# Patient Record
Sex: FEMALE | Race: WHITE | NOT HISPANIC OR LATINO | Employment: OTHER | URBAN - METROPOLITAN AREA
[De-identification: names, ages, dates, MRNs, and addresses within clinical notes are randomized per-mention and may not be internally consistent; named-entity substitution may affect disease eponyms.]

---

## 2017-01-27 ENCOUNTER — INFUSION THERAPY VISIT (OUTPATIENT)
Dept: INFUSION THERAPY | Facility: CLINIC | Age: 82
End: 2017-01-27
Attending: INTERNAL MEDICINE
Payer: COMMERCIAL

## 2017-01-27 ENCOUNTER — HOSPITAL ENCOUNTER (OUTPATIENT)
Facility: CLINIC | Age: 82
Setting detail: SPECIMEN
Discharge: HOME | End: 2017-01-27
Attending: INTERNAL MEDICINE | Admitting: INTERNAL MEDICINE
Payer: COMMERCIAL

## 2017-01-27 DIAGNOSIS — C18.2 CANCER OF RIGHT COLON (H): ICD-10-CM

## 2017-01-27 DIAGNOSIS — C50.211 MALIGNANT NEOPLASM OF UPPER-INNER QUADRANT OF RIGHT FEMALE BREAST (H): ICD-10-CM

## 2017-01-27 LAB
ALBUMIN SERPL-MCNC: 3.7 G/DL (ref 3.4–5)
ALP SERPL-CCNC: 72 U/L (ref 40–150)
ALT SERPL W P-5'-P-CCNC: 32 U/L (ref 0–50)
AST SERPL W P-5'-P-CCNC: 24 U/L (ref 0–45)
BILIRUB DIRECT SERPL-MCNC: <0.1 MG/DL (ref 0–0.2)
BILIRUB SERPL-MCNC: 0.4 MG/DL (ref 0.2–1.3)
ERYTHROCYTE [DISTWIDTH] IN BLOOD BY AUTOMATED COUNT: 13.1 % (ref 10–15)
HCT VFR BLD AUTO: 37.9 % (ref 35–47)
HGB BLD-MCNC: 13.4 G/DL (ref 11.7–15.7)
MCH RBC QN AUTO: 32.5 PG (ref 26.5–33)
MCHC RBC AUTO-ENTMCNC: 35.4 G/DL (ref 31.5–36.5)
MCV RBC AUTO: 92 FL (ref 78–100)
PLATELET # BLD AUTO: 292 10E9/L (ref 150–450)
PROT SERPL-MCNC: 7.7 G/DL (ref 6.8–8.8)
RBC # BLD AUTO: 4.12 10E12/L (ref 3.8–5.2)
WBC # BLD AUTO: 4.6 10E9/L (ref 4–11)

## 2017-01-27 PROCEDURE — 80076 HEPATIC FUNCTION PANEL: CPT | Performed by: INTERNAL MEDICINE

## 2017-01-27 PROCEDURE — 82378 CARCINOEMBRYONIC ANTIGEN: CPT | Performed by: INTERNAL MEDICINE

## 2017-01-27 PROCEDURE — 85027 COMPLETE CBC AUTOMATED: CPT | Performed by: INTERNAL MEDICINE

## 2017-01-27 PROCEDURE — 36415 COLL VENOUS BLD VENIPUNCTURE: CPT

## 2017-01-27 NOTE — PROGRESS NOTES
Medical Assistant Note:    Wanda Dockery presents today for labs.    Patient seen by provider today: No  Concerns: No Concerns    Labs drawn:  See orders   Peripheral RAC 21 gauge, green butterfly needle type. Tolerated procedure well.    Post Assessment:  Labs drawn without difficulty: Yes.    Discharge Plan:  Face to Face time: 8min.      Jessica Rhoades MA

## 2017-01-28 LAB — CEA SERPL-MCNC: 2.4 UG/L (ref 0–2.5)

## 2017-01-30 ENCOUNTER — ONCOLOGY VISIT (OUTPATIENT)
Dept: ONCOLOGY | Facility: CLINIC | Age: 82
End: 2017-01-30
Attending: INTERNAL MEDICINE
Payer: COMMERCIAL

## 2017-01-30 VITALS
RESPIRATION RATE: 16 BRPM | BODY MASS INDEX: 23.67 KG/M2 | TEMPERATURE: 97.9 F | WEIGHT: 146.6 LBS | DIASTOLIC BLOOD PRESSURE: 77 MMHG | HEART RATE: 80 BPM | SYSTOLIC BLOOD PRESSURE: 119 MMHG | OXYGEN SATURATION: 98 %

## 2017-01-30 DIAGNOSIS — C50.211 MALIGNANT NEOPLASM OF UPPER-INNER QUADRANT OF RIGHT FEMALE BREAST (H): Primary | ICD-10-CM

## 2017-01-30 DIAGNOSIS — C18.2 CANCER OF RIGHT COLON (H): ICD-10-CM

## 2017-01-30 PROCEDURE — 99214 OFFICE O/P EST MOD 30 MIN: CPT | Performed by: INTERNAL MEDICINE

## 2017-01-30 PROCEDURE — 99211 OFF/OP EST MAY X REQ PHY/QHP: CPT

## 2017-01-30 RX ORDER — ANASTROZOLE 1 MG/1
1 TABLET ORAL DAILY
Qty: 90 TABLET | Refills: 3 | Status: SHIPPED | OUTPATIENT
Start: 2017-01-30 | End: 2017-07-17

## 2017-01-30 ASSESSMENT — PAIN SCALES - GENERAL: PAINLEVEL: NO PAIN (0)

## 2017-01-30 NOTE — PROGRESS NOTES
"Wanda Dockery is a 81 year old female who presents for:  Chief Complaint   Patient presents with     Oncology Clinic Visit     Ret Breast Ca         Initial Vitals:  There were no vitals taken for this visit. Estimated body mass index is 22.35 kg/(m^2) as calculated from the following:    Height as of 8/29/16: 1.676 m (5' 6\").    Weight as of 10/3/16: 62.778 kg (138 lb 6.4 oz).. There is no height or weight on file to calculate BSA. BP completed using cuff size: regular  Data Unavailable No LMP recorded. Patient is postmenopausal. Allergies and medications reviewed.     Medications: Medication refills needed today.  Pharmacy name entered into [email or web address]: [facility] [place] - [place], [place] - [address]    Comments: Needs Anastrozole refill    6 minutes for nursing intake (face to face time)   Mary Lou Bolanos CMA        DISCHARGE PLAN:    1.) She is aware to continue anastrozole/ Calcium and Vitamin D  2.) Escorted to  to have scheduling coordinate appointments for her    Next appointments: See patient instruction section  Departure Mode: Ambulatory  Accompanied by:   5 minutes for nursing discharge (face to face time)   Susanne Castorena RN      "

## 2017-01-30 NOTE — PATIENT INSTRUCTIONS
Continue anastrazole.  Continue calcium and vitamin D.  Mammogram.  Labs and CT scan in mid July.  FU after CT scan.

## 2017-01-30 NOTE — MR AVS SNAPSHOT
After Visit Summary   1/30/2017    Wanda Dockery    MRN: [id number]           Patient Information     Date Of Birth          1935        Visit Information        Provider Department      1/30/2017 8:30 AM Angie Rodas MD [facility]        Today's Diagnoses     Malignant neoplasm of upper-inner quadrant of right female breast (H)    -  1     Cancer of right colon (H)           Care Instructions    Continue anastrazole.  Continue calcium and vitamin D.  Mammogram.  Labs and CT scan in mid July.  FU after CT scan.        Follow-ups after your visit        Your next 10 appointments already scheduled     Feb 14, 2017 10:30 AM   MA DIAGNOSTIC DIGITAL BILATERAL with SHBCMA3   [facility] ([facility])    [address], [address]  [place] [address] [phone]           Do not use any powder, lotion or deodorant under your arms or on your breast. If you do, we will ask you to remove it before your exam.  Wear comfortable, two-piece clothing.  If you have any allergies, tell your care team.  Bring any previous mammograms from other facilities or have them mailed to the breast center.            Jul 11, 2017 10:00 AM   Level 1 with SOUTH CHAIR 5   [facility] ([facility])    [facility]  [address]  [place] [facility] [address] [phone]            Jul 11, 2017 11:00 AM   CT CHEST ABDOMEN PELVIS W/O & W CONTRAST with SHCT1   [facility] ([facility])    [address] [address] [phone]           Please bring any scans or X-rays taken at other hospitals, if similar tests were done. Also bring a list of your medicines, including vitamins, minerals and over-the-counter drugs. It is safest to leave personal items at home.  Be sure to tell your doctor:   If you have any allergies.   If there s any chance you are  pregnant.   If you are breastfeeding.   If you have any special needs.  You may have contrast for this exam. To prepare:   Do not eat or drink for 2 hours before your exam. If you need to take medicine, you may take it with small sips of water. (We may ask you to take liquid medicine as well.)   The day before your exam, drink extra fluids at least six 8-ounce glasses (unless your doctor tells you to restrict your fluids).  Patients over 70 or patients with diabetes or kidney problems:   If you haven t had a blood test (creatinine test) within the last 30 days, go to your clinic or Diagnostic Imaging Department for this test.  If you have diabetes:   If your kidney function is normal, continue taking your metformin (Avandamet, Glucophage, Glucovance, Metaglip) on the day of your exam.   If your kidney function is abnormal, wait 48 hours before restarting this medicine.  You will have oral contrast for this exam:   You will drink the contrast at home. Get this from your clinic or Diagnostic Imaging Department. Please follow the directions given.  Please wear loose clothing, such as a sweat suit or jogging clothes. Avoid snaps, zippers and other metal. We may ask you to undress and put on a hospital gown.  If you have any questions, please call the Imaging Department where you will have your exam.            Jul 17, 2017  9:30 AM   Return Visit with Angie Rodas MD   [facility] ([facility])    [facility]  [address]  [facility] [address]   [phone]              Future tests that were ordered for you today     Open Future Orders        Priority Expected Expires Ordered    CBC with platelets Routine 7/17/2017 9/30/2017 1/30/2017    CEA Routine 7/17/2017 9/30/2017 1/30/2017    Comprehensive metabolic panel Routine 7/17/2017 9/30/2017 1/30/2017    CT Chest Abdomen Pelvis w/o & w Contrast Routine 7/17/2017 9/30/2017 1/30/2017    MA Diagnostic Digital  "Bilateral Routine  2018            Who to contact     If you have questions or need follow up information about today's clinic visit or your schedule please contact [facility] directly at [phone].  Normal or non-critical lab and imaging results will be communicated to you by [employer], letter or phone within 4 business days after the clinic has received the results. If you do not hear from us within 7 days, please contact the clinic through [employer] or phone. If you have a critical or abnormal lab result, we will notify you by phone as soon as possible.  Submit refill requests through [employer] or call your pharmacy and they will forward the refill request to us. Please allow 3 business days for your refill to be completed.          Additional Information About Your Visit        [employer] Information     [employer] lets you send messages to your doctor, view your test results, renew your prescriptions, schedule appointments and more. To sign up, go to [email or web address] . Click on \"Log in\" on the left side of the screen, which will take you to the Welcome page. Then click on \"Sign up Now\" on the right side of the page.     You will be asked to enter the access code listed below, as well as some personal information. Please follow the directions to create your username and password.     Your access code is: [email or web address]  Expires: 2017  9:33 AM     Your access code will  in 90 days. If you need help or a new code, please call your [place] clinic or [phone].        Care EveryWhere ID     This is your Care EveryWhere ID. This could be used by other organizations to access your [place] medical records  [id number]        Your Vitals Were     Pulse Temperature Respirations Pulse Oximetry          80 97.9  F (36.6  C) (Oral) 16 98%         Blood Pressure from Last 3 Encounters:   17 119/77   10/03/16 99/56   16 121/68    Weight from Last 3 Encounters:   17 " 66.497 kg (146 lb 9.6 oz)   10/03/16 62.778 kg (138 lb 6.4 oz)   08/29/16 61.372 kg (135 lb 4.8 oz)                 Today's Medication Changes          These changes are accurate as of: 1/30/17  9:33 AM.  If you have any questions, ask your nurse or doctor.               These medicines have changed or have updated prescriptions.        Dose/Directions    anastrozole 1 MG tablet   Commonly known as:  ARIMIDEX   This may have changed:    - medication strength  - how much to take  - when to take this   Used for:  Malignant neoplasm of upper-inner quadrant of right female breast (H)        Dose:  1 mg   Take 1 tablet (1 mg) by mouth daily   Quantity:  90 tablet   Refills:  3            Where to get your medicines      These medications were sent to [facility] - [address]  [address], [place] [address]     Phone:  [phone]    - anastrozole 1 MG tablet             Primary Care Provider Office Phone # Fax #    Ginette Lemus PA-C [phone]       [facility] [place] [address]  [facility] [address]        Thank you!     Thank you for choosing [facility]  for your care. Our goal is always to provide you with excellent care. Hearing back from our patients is one way we can continue to improve our services. Please take a few minutes to complete the written survey that you may receive in the mail after your visit with us. Thank you!             Your Updated Medication List - Protect others around you: Learn how to safely use, store and throw away your medicines at www.disposemymeds.org.          This list is accurate as of: 1/30/17  9:33 AM.  Always use your most recent med list.                   Brand Name Dispense Instructions for use    anastrozole 1 MG tablet    ARIMIDEX    90 tablet    Take 1 tablet (1 mg) by mouth daily       CALCIUM + D3 PO      Take 1 tablet by mouth daily Dose 500 - 500 mg       erythromycin ophthalmic  ointment    ROMYCIN    3.5 g    Place 1 Application into both eyes At Bedtime       MULTIVITAMIN & MINERAL PO      Take 1 tablet by mouth daily.

## 2017-01-30 NOTE — PROGRESS NOTES
ONCOLOGIC HISTORY:  Wanda Dockery is a female with stage IIB (pT2 pN1a) right breast cancer. ER positive and HER-2/mario positive. She also has stage II (pT2 pN0 M0) colon cancer.  1. Bilateral diagnostic mammograms and right breast ultrasound on 06/23/2015 for painless lump revealed a 3 cm lesion at 1 o'clock position and right axillary lymphadenopathy.   2. Ultrasound-guided biopsy of right breast and axillary lymph node on 06/29/2015 revealed grade 3 invasive ductal carcinoma, both in the breast and axillary lymph node. ER positive and HER-2/mario equivocal by FISH. IHC is 3+ in the right breast. In the right lymph node IHC is 2+.  3. Bone scan on 07/07/2015 did not reveal any bone metastasis.   4. CT of the chest, abdomen and pelvis on 07/07/2015 revealed right breast lesion and 1.2 x 2 cm right axillary lymph node. There is thickening of the wall of the hepatitic flexure of the colon with associated inflammatory changes and multiple mildly prominent lymph nodes in the mesentery.   6. Colonoscopy on 07/13/2015 revealed fungating, ulcerated, partially obstructing mass at the hepatic flexure and a sessile polyp in the cecum.  Hepatic flexure mass revealed poorly differentiated adenocarcinoma. 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 There is absence of expression of DNA mismatch repair protein MLH1 and PMS2.   7. Right breast lumpectomy, right lymph node dissection and right colectomy was done on 07/16/2015.    -Hemicolectomy specimen revealed high-grade adenocarcinoma measuring 7.5 cm extending into the santi-intestinal fatty tissue. Margins were negative. No lymphovascular invasion. Twenty-one benign lymph nodes. She has stage II (pT2 pN0 M0) colon cancer.   -Right breast reveals invasive ductal carcinoma, grade 2. It measures 2.1 cm. There was no DCIS. There was lymphovascular invasion present. Margins positive for invasive carcinoma. One of 7 lymph nodes positive. No extranodal extension. The patient has stage IIB (pT2  pN1a) breast cancer.   8. Reexcision of the lumpectomy site on 07/29/2015. There was no residual malignancy.   9. Weekly Taxol and Herceptin x 12 was given between 08/17/2015 and 11/02/2015. Herceptin q3 weeks started on 11/09/2015. One year of herceptin completed on 08/08/2016.  10. Anastrazole started 11/30/2015.  11. DEXA scan on 04/25/2016 reveals osteopenia.  12. The patient received radiation for breast cancer.  5040 cGy between 04/18/2016 and 06/15/2016.    13. CT of the chest, abdomen and pelvis on 07/19/2016 does not reveal any evidence of malignancy.  There is a tiny stable nodule in the left lower lobe.    14. Colonoscopy on 08/01/2016 revealed tiny polyp.  Pathology revealed tubular adenoma.   15. Patient does not want bisphosphonate or prolia    SUBJECTIVE:  Ms. Wanda Dockery is an 81-year-old female with 2 different malignancies.  The patient was diagnosed with right breast cancer and colon cancer in 2015.  The patient had a right breast lumpectomy along with right lymph node dissection and right colectomy in 07/2015.  She had a stage IIB right breast invasive ductal carcinoma ER positive and HER-2/mario positive.  She had stage II colon cancer.  For breast cancer, the patient received weekly Taxol with Herceptin, followed by 1 year of Herceptin.  She also received radiation.  She is on anastrozole since 11/2015.  For colon cancer, no chemotherapy was given.      The patient is doing well.  She has no complaints or concerns.  No headache.  No dizziness.  No neck pain.  No chest pain or difficulty breathing.  No abdominal pain, nausea or vomiting.  No urinary or bowel complaints.  No bleeding.  No fever, chills or night sweats.      PHYSICAL EXAMINATION:   Alert and oriented x 3.   VITAL SIGNS: Reviewed.   EYES: No icterus.   THROAT: No ulcer or thrush.   NECK: Supple. No lymphadenopathy or thyromegaly.   AXILLAE: No lymphadenopathy.   LUNGS: Good air entry bilaterally. No crackles or wheezing.   HEART:  Regular. No murmur.   ABDOMEN: Soft. Nontender. No mass.   EXTREMITIES: No edema. No calf swelling or tenderness.   SKIN: No rash.       BREASTS:  Both the breasts examined in the presence of the nurse.    -The right breast has post-surgical changes.  No suspicious mass.  No suspicious skin lesion.  No nipple discharge.  -Left breast is normal.  No masses, skin changes or nipple discharge.      LABORATORY DATA:  Reviewed.      ASSESSMENT:   1.  An 81-year-old female with stage IIB right breast invasive carcinoma status post surgery, radiation, chemotherapy.  The patient is on anastrozole.  No evidence of recurrence.   2.  Stage II colon cancer, status post right hemicolectomy.  No evidence of recurrence.   3.  Osteopenia.      PLAN:   1.  I discussed with her regarding breast cancer.  The patient is doing well.  No evidence of recurrence of breast cancer.  She is on anastrozole.  She is tolerating it well.  She will continue on it.  Plan is to give it for at least 5 years.  If she is doing well, we may consider giving it for 10 years.  She will get a mammogram done in the next few weeks.   2.  For colon cancer, we will continue to monitor her.  In mid July we will get labs including CEA.  Also, get CT of the chest, abdomen and pelvis.   3.  The patient has osteopenia.  I have discussed with her regarding bisphosphonate and Prolia.  The patient says that she does not want any one of them.  She has read through the side effects.  She is worried about that.  I explained to her that osteopenia can get worse with anastrozole, but she does not want it.  At this time, she will continue on calcium and vitamin D twice a day.  We will recheck another bone density in about 1 year's time.  The patient said if there is worsening of osteopenia, she may consider bisphosphonate or Prolia.   4.  I will see her in 6 months with labs and scans.  I advised her to see a physician sooner if she has any lump, unexplained pain, weight  loss, worsening weakness, change in bowel habits, blood in the stool or any other concerns.         WILFREDO COLON MD             D: 2017 09:59   T: 2017 10:15   MT: BOUCHRA      Name:     ALTAF DEL ROSARIO   MRN:      [id number]        Account:      [id number]   :      1935           Visit Date:   2017      Document: [id number]

## 2017-02-14 ENCOUNTER — HOSPITAL ENCOUNTER (OUTPATIENT)
Dept: MAMMOGRAPHY | Facility: CLINIC | Age: 82
Discharge: HOME | End: 2017-02-14
Attending: INTERNAL MEDICINE | Admitting: INTERNAL MEDICINE
Payer: COMMERCIAL

## 2017-02-14 DIAGNOSIS — C50.211 MALIGNANT NEOPLASM OF UPPER-INNER QUADRANT OF RIGHT FEMALE BREAST (H): ICD-10-CM

## 2017-02-14 PROCEDURE — 77062 BREAST TOMOSYNTHESIS BI: CPT

## 2017-07-11 ENCOUNTER — HOSPITAL ENCOUNTER (OUTPATIENT)
Dept: CT IMAGING | Facility: CLINIC | Age: 82
Discharge: HOME | End: 2017-07-11
Attending: INTERNAL MEDICINE | Admitting: INTERNAL MEDICINE
Payer: COMMERCIAL

## 2017-07-11 ENCOUNTER — INFUSION THERAPY VISIT (OUTPATIENT)
Dept: INFUSION THERAPY | Facility: CLINIC | Age: 82
End: 2017-07-11
Attending: INTERNAL MEDICINE
Payer: COMMERCIAL

## 2017-07-11 ENCOUNTER — HOSPITAL ENCOUNTER (OUTPATIENT)
Facility: CLINIC | Age: 82
Setting detail: SPECIMEN
Discharge: HOME | End: 2017-07-11
Attending: INTERNAL MEDICINE | Admitting: INTERNAL MEDICINE
Payer: COMMERCIAL

## 2017-07-11 DIAGNOSIS — C50.211 MALIGNANT NEOPLASM OF UPPER-INNER QUADRANT OF RIGHT FEMALE BREAST (H): ICD-10-CM

## 2017-07-11 DIAGNOSIS — C18.2 CANCER OF RIGHT COLON (H): ICD-10-CM

## 2017-07-11 LAB
ALBUMIN SERPL-MCNC: 3.7 G/DL (ref 3.4–5)
ALP SERPL-CCNC: 72 U/L (ref 40–150)
ALT SERPL W P-5'-P-CCNC: 29 U/L (ref 0–50)
ANION GAP SERPL CALCULATED.3IONS-SCNC: 7 MMOL/L (ref 3–14)
AST SERPL W P-5'-P-CCNC: 24 U/L (ref 0–45)
BILIRUB SERPL-MCNC: 0.6 MG/DL (ref 0.2–1.3)
BUN SERPL-MCNC: 17 MG/DL (ref 7–30)
CALCIUM SERPL-MCNC: 9.2 MG/DL (ref 8.5–10.1)
CEA SERPL-MCNC: 2.4 UG/L (ref 0–2.5)
CHLORIDE SERPL-SCNC: 102 MMOL/L (ref 94–109)
CO2 SERPL-SCNC: 29 MMOL/L (ref 20–32)
CREAT SERPL-MCNC: 0.8 MG/DL (ref 0.52–1.04)
ERYTHROCYTE [DISTWIDTH] IN BLOOD BY AUTOMATED COUNT: 12.7 % (ref 10–15)
GFR SERPL CREATININE-BSD FRML MDRD: 69 ML/MIN/1.7M2
GLUCOSE SERPL-MCNC: 92 MG/DL (ref 70–99)
HCT VFR BLD AUTO: 38.8 % (ref 35–47)
HGB BLD-MCNC: 13.7 G/DL (ref 11.7–15.7)
MCH RBC QN AUTO: 32.2 PG (ref 26.5–33)
MCHC RBC AUTO-ENTMCNC: 35.3 G/DL (ref 31.5–36.5)
MCV RBC AUTO: 91 FL (ref 78–100)
PLATELET # BLD AUTO: 275 10E9/L (ref 150–450)
POTASSIUM SERPL-SCNC: 3.9 MMOL/L (ref 3.4–5.3)
PROT SERPL-MCNC: 7.9 G/DL (ref 6.8–8.8)
RBC # BLD AUTO: 4.25 10E12/L (ref 3.8–5.2)
SODIUM SERPL-SCNC: 138 MMOL/L (ref 133–144)
WBC # BLD AUTO: 4.2 10E9/L (ref 4–11)

## 2017-07-11 PROCEDURE — 25000128 H RX IP 250 OP 636: Performed by: INTERNAL MEDICINE

## 2017-07-11 PROCEDURE — 36592 COLLECT BLOOD FROM PICC: CPT

## 2017-07-11 PROCEDURE — 25000125 ZZHC RX 250: Performed by: INTERNAL MEDICINE

## 2017-07-11 PROCEDURE — 74177 CT ABD & PELVIS W/CONTRAST: CPT

## 2017-07-11 PROCEDURE — 71260 CT THORAX DX C+: CPT

## 2017-07-11 RX ORDER — IOPAMIDOL 755 MG/ML
71 INJECTION, SOLUTION INTRAVASCULAR ONCE
Status: COMPLETED | OUTPATIENT
Start: 2017-07-11 | End: 2017-07-11

## 2017-07-11 RX ADMIN — IOPAMIDOL 71 ML: 755 INJECTION, SOLUTION INTRAVENOUS at 11:09

## 2017-07-11 RX ADMIN — SODIUM CHLORIDE 60 ML: 9 INJECTION, SOLUTION INTRAVENOUS at 11:10

## 2017-07-11 NOTE — PROGRESS NOTES
Infusion Nursing Note:  Wanda Dockery presents today for lab draw and IV start for CT scan.    Patient seen by provider today: No   present during visit today: Not Applicable.    Note: N/A.    Intravenous Access:  Labs drawn without difficulty.  Peripheral IV placed.    Treatment Conditions:  Lab Results   Component Value Date    HGB 13.7 07/11/2017     Lab Results   Component Value Date    WBC 4.2 07/11/2017      Lab Results   Component Value Date    ANEU 2.8 09/30/2016     Lab Results   Component Value Date     07/11/2017      Lab Results   Component Value Date     07/11/2017                   Lab Results   Component Value Date    POTASSIUM 3.9 07/11/2017           No results found for: MAG         Lab Results   Component Value Date    CR 0.80 07/11/2017                   Lab Results   Component Value Date    CHRISTINE 9.2 07/11/2017                Lab Results   Component Value Date    BILITOTAL 0.6 07/11/2017           Lab Results   Component Value Date    ALBUMIN 3.7 07/11/2017                    Lab Results   Component Value Date    ALT 29 07/11/2017           Lab Results   Component Value Date    AST 24 07/11/2017         Post Infusion Assessment:  Site patent and intact, free from redness, edema or discomfort.  No evidence of extravasations.  PIV was left in place for CT [facility]. PIV was wrapped with coban for protection.    Discharge Plan:   Discharge instructions reviewed with: Patient.  Patient and/or family verbalized understanding of discharge instructions and all questions answered.  Copy of AVS reviewed with patient and/or family.  Patient will return Monday for next appointment.  Patient discharged in stable condition accompanied by: self.  Departure Mode: Ambulatory.    Jacklyn Lockwood RN

## 2017-07-11 NOTE — MR AVS SNAPSHOT
After Visit Summary   7/11/2017    Wanda Dockery    MRN: [id number]           Patient Information     Date Of Birth          1935        Visit Information        Provider Department      7/11/2017 10:00 AM  INFUSION CHAIR 2 [facility]        Today's Diagnoses     Malignant neoplasm of upper-inner quadrant of right female breast (H)        Cancer of right colon (H)           Follow-ups after your visit        Your next 10 appointments already scheduled     Jul 11, 2017 11:00 AM CDT   CT CHEST ABDOMEN PELVIS W/O & W CONTRAST with SHCT1   [facility] ([facility])    [address] [place] [address] [phone]           Please bring any scans or X-rays taken at other hospitals, if similar tests were done. Also bring a list of your medicines, including vitamins, minerals and over-the-counter drugs. It is safest to leave personal items at home.  Be sure to tell your doctor:   If you have any allergies.   If there s any chance you are pregnant.   If you are breastfeeding.   If you have any special needs.  You may have contrast for this exam. To prepare:   Do not eat or drink for 2 hours before your exam. If you need to take medicine, you may take it with small sips of water. (We may ask you to take liquid medicine as well.)   The day before your exam, drink extra fluids at least six 8-ounce glasses (unless your doctor tells you to restrict your fluids).  Patients over 70 or patients with diabetes or kidney problems:   If you haven t had a blood test (creatinine test) within the last 30 days, go to your clinic or Diagnostic Imaging Department for this test.  If you have diabetes:   If your kidney function is normal, continue taking your metformin (Avandamet, Glucophage, Glucovance, Metaglip) on the day of your exam.   If your kidney function is abnormal, wait 48 hours before restarting this medicine.  You will have oral contrast  "for this exam:   You will drink the contrast at home. Get this from your clinic or Diagnostic Imaging Department. Please follow the directions given.  Please wear loose clothing, such as a sweat suit or jogging clothes. Avoid snaps, zippers and other metal. We may ask you to undress and put on a hospital gown.  If you have any questions, please call the Imaging Department where you will have your exam.            Jul 17, 2017  9:30 AM CDT   Return Visit with Angie Rodas MD   [facility] ([facility])    [facility]  [address]  [facility] [address] [phone]              Who to contact     If you have questions or need follow up information about today's clinic visit or your schedule please contact [facility] directly at [phone].  Normal or non-critical lab and imaging results will be communicated to you by MyChart, letter or phone within 4 business days after the clinic has received the results. If you do not hear from us within 7 days, please contact the clinic through MyChart or phone. If you have a critical or abnormal lab result, we will notify you by phone as soon as possible.  Submit refill requests through [employer] or call your pharmacy and they will forward the refill request to us. Please allow 3 business days for your refill to be completed.          Additional Information About Your Visit        [employer] Information     [employer] lets you send messages to your doctor, view your test results, renew your prescriptions, schedule appointments and more. To sign up, go to [email or web address] . Click on \"Log in\" on the left side of the screen, which will take you to the Welcome page. Then click on \"Sign up Now\" on the right side of the page.     You will be asked to enter the access code listed below, as well as some personal information. Please follow the directions to create your username and " password.     Your access code is: [email or web address]  Expires: 10/9/2017 10:21 AM     Your access code will  in 90 days. If you need help or a new code, please call your [place] clinic or [phone].        Care EveryWhere ID     This is your Care EveryWhere ID. This could be used by other organizations to access your [place] medical records  [id number]         Blood Pressure from Last 3 Encounters:   17 119/77   10/03/16 99/56   16 121/68    Weight from Last 3 Encounters:   17 66.5 kg (146 lb 9.6 oz)   10/03/16 62.8 kg (138 lb 6.4 oz)   16 61.4 kg (135 lb 4.8 oz)              We Performed the Following     CBC with platelets     CEA     Comprehensive metabolic panel        Primary Care Provider Office Phone # Fax #    Ginette Lemus PA-C [phone]       [facility] [place] [address]  [place] [address]        Equal Access to Services     [facility] : Hadii aad ku hadasho Soomaali, waaxda luqadaha, qaybta kaalmada adeegyada, marie soto . So [facility] [phone].    ATENCIÓN: Si habla español, tiene a hernandez disposición servicios gratuitos de asistencia lingüística. Melody al [phone].    We comply with applicable federal civil rights laws and [place] laws. We do not discriminate on the basis of race, color, national origin, age, disability sex, sexual orientation or gender identity.            Thank you!     Thank you for choosing [facility] AND [facility]  for your care. Our goal is always to provide you with excellent care. Hearing back from our patients is one way we can continue to improve our services. Please take a few minutes to complete the written survey that you may receive in the mail after your visit with us. Thank you!             Your Updated Medication List - Protect others around you: Learn how to safely use, store and throw away your medicines at www.disposemymeds.org.          This list is  accurate as of: 7/11/17 10:21 AM.  Always use your most recent med list.                   Brand Name Dispense Instructions for use Diagnosis    anastrozole 1 MG tablet    ARIMIDEX    90 tablet    Take 1 tablet (1 mg) by mouth daily    Malignant neoplasm of upper-inner quadrant of right female breast (H)       CALCIUM + D3 PO      Take 1 tablet by mouth daily Dose 500 - 500 mg        erythromycin ophthalmic ointment    ROMYCIN    3.5 g    Place 1 Application into both eyes At Bedtime    Blepharitis of both eyes       MULTIVITAMIN & MINERAL PO      Take 1 tablet by mouth daily.

## 2017-07-17 ENCOUNTER — ONCOLOGY VISIT (OUTPATIENT)
Dept: ONCOLOGY | Facility: CLINIC | Age: 82
End: 2017-07-17
Attending: INTERNAL MEDICINE
Payer: COMMERCIAL

## 2017-07-17 VITALS
WEIGHT: 141 LBS | OXYGEN SATURATION: 96 % | TEMPERATURE: 97.7 F | HEART RATE: 77 BPM | BODY MASS INDEX: 22.76 KG/M2 | DIASTOLIC BLOOD PRESSURE: 70 MMHG | SYSTOLIC BLOOD PRESSURE: 113 MMHG | RESPIRATION RATE: 16 BRPM

## 2017-07-17 DIAGNOSIS — Z17.0 MALIGNANT NEOPLASM OF UPPER-INNER QUADRANT OF RIGHT BREAST IN FEMALE, ESTROGEN RECEPTOR POSITIVE (H): Primary | ICD-10-CM

## 2017-07-17 DIAGNOSIS — E04.1 THYROID NODULE: ICD-10-CM

## 2017-07-17 DIAGNOSIS — C50.211 MALIGNANT NEOPLASM OF UPPER-INNER QUADRANT OF RIGHT BREAST IN FEMALE, ESTROGEN RECEPTOR POSITIVE (H): Primary | ICD-10-CM

## 2017-07-17 DIAGNOSIS — C18.2 CANCER OF RIGHT COLON (H): ICD-10-CM

## 2017-07-17 DIAGNOSIS — M85.89 OSTEOPENIA OF MULTIPLE SITES: ICD-10-CM

## 2017-07-17 PROCEDURE — 99211 OFF/OP EST MAY X REQ PHY/QHP: CPT

## 2017-07-17 PROCEDURE — 99214 OFFICE O/P EST MOD 30 MIN: CPT | Performed by: INTERNAL MEDICINE

## 2017-07-17 RX ORDER — ANASTROZOLE 1 MG/1
1 TABLET ORAL DAILY
Qty: 90 TABLET | Refills: 3 | Status: SHIPPED | OUTPATIENT
Start: 2017-07-17 | End: 2018-02-06

## 2017-07-17 ASSESSMENT — PAIN SCALES - GENERAL: PAINLEVEL: NO PAIN (0)

## 2017-07-17 NOTE — PATIENT INSTRUCTIONS
DEXA scan.  Scheduled/Janice  Thyroid ultrasound.   Scheduled/Janice  Thyroid nodule ultrasound guided biopsy.  Scheduled/Janice  Continue anastrazole.  Continue calcium and vitamin D.  Follow up after biopsy.  Scheduled/Janice    AVS printed & given to patient/Doreen

## 2017-07-17 NOTE — MR AVS SNAPSHOT
After Visit Summary   7/17/2017    Wanda Dockery    MRN: [id number]           Patient Information     Date Of Birth          1935        Visit Information        Provider Department      7/17/2017 9:30 AM Angie Rodas MD [facility]        Today's Diagnoses     Malignant neoplasm of upper-inner quadrant of right breast in female, estrogen receptor positive (H)    -  1    Cancer of right colon (H)        Thyroid nodule        Osteopenia of multiple sites          Care Instructions    DEXA scan.  Thyroid ultrasound.  Thyroid nodule ultrasound guided biopsy.  Continue anastrazole.  Continue calcium and vitamin D.  Follow up after biopsy.          Follow-ups after your visit        Your next 10 appointments already scheduled     Aug 07, 2017  9:00 AM CDT   DX HIP/PELVIS/SPINE with SHMADX1   [facility] ([facility])    [address] [id number] [phone]           Please do not take any of the following 24 hours prior to the day of your exam: vitamins, calcium tablets, antacids.            Aug 07, 2017 10:00 AM CDT   US THYROID with SHUS1   [facility] ([facility])    [address] [facility] [id number] [phone]           Please bring a list of your medicines (including vitamins, minerals and over-the-counter drugs). Also, tell your doctor about any allergies you may have. Wear comfortable clothes and leave your valuables at home.  You do not need to do anything special to prepare for your exam.  Please call the Imaging Department at your exam site with any questions.            Aug 07, 2017 10:30 AM CDT   US BIOPSY THYROID FINE NEEDLE ASPIRATION with SHUS4   [facility] ([facility])    [address] [facility] [id number] [phone]           Tell us in advance if there s any chance you may be pregnant.  Bring a list of your  medicines to the exam. Include vitamins, minerals and over-the-counter drugs.  If you take blood thinners, you may need to stop taking them a few days before treatment. Talk to your doctor before stopping these medicines. You will need a blood test the morning of your exam.   Stop taking Coumadin (warfarin) 3 days before your exam. Restart the day after your exam.   If you take aspirin, you may need to stop taking it 3 days before your scan.   If you take Plavix, Ticlid, Pletal or Persantine, you may need to stop taking them 5 days before your scan. Please talk to your doctor before stopping these medicines.  If you will receive sedation for this test (medicine to help you relax):   See your family doctor for an exam within 30 days of treatment.   Plan for an adult to drive you home and stay with you for at least 6 hours.   Follow the eating and drinking guidelines checked below:   No eating or drinking for 4 hours before your test. You may take medicine with small sips of water.   If you have diabetes:If you take insulin, call your diabetes care team. Do not take diabetes pills on the morning of your test. If you take metformin (Avandamet, Glucophage, Glucovance, Metaglip) and received contrast, wait 48 hours before re-starting this medicine.  Please call the Imaging Department at your exam site with any questions.            Aug 14, 2017 10:00 AM CDT   Return Visit with Angie Rodas MD   [facility] ([facility])    [facility]  [address]  [facility] [address]   [phone]              Future tests that were ordered for you today     Open Future Orders        Priority Expected Expires Ordered    DX Hip/Pelvis/Spine Routine  7/17/2018 7/17/2017    US Biopsy Thyroid Fine Needle Aspiration Routine  7/17/2018 7/17/2017    US Thyroid Routine  7/17/2018 7/17/2017            Who to contact     If you have questions or need follow up information about  "today's clinic visit or your schedule please contact [facility] directly at [phone].  Normal or non-critical lab and imaging results will be communicated to you by [employer], letter or phone within 4 business days after the clinic has received the results. If you do not hear from us within 7 days, please contact the clinic through [employer] or phone. If you have a critical or abnormal lab result, we will notify you by phone as soon as possible.  Submit refill requests through [employer] or call your pharmacy and they will forward the refill request to us. Please allow 3 business days for your refill to be completed.          Additional Information About Your Visit        [employer] Information     [employer] lets you send messages to your doctor, view your test results, renew your prescriptions, schedule appointments and more. To sign up, go to [email or web address] . Click on \"Log in\" on the left side of the screen, which will take you to the Welcome page. Then click on \"Sign up Now\" on the right side of the page.     You will be asked to enter the access code listed below, as well as some personal information. Please follow the directions to create your username and password.     Your access code is: [email or web address]  Expires: 10/9/2017 10:21 AM     Your access code will  in 90 days. If you need help or a new code, please call your [place] clinic or [phone].        Care EveryWhere ID     This is your Care EveryWhere ID. This could be used by other organizations to access your [place] medical records  [id number]        Your Vitals Were     Pulse Temperature Respirations Pulse Oximetry BMI (Body Mass Index)       77 97.7  F (36.5  C) (Oral) 16 96% 22.76 kg/m2        Blood Pressure from Last 3 Encounters:   17 113/70   17 119/77   10/03/16 99/56    Weight from Last 3 Encounters:   17 64 kg (141 lb)   17 66.5 kg (146 lb 9.6 oz)   10/03/16 62.8 kg (138 lb 6.4 oz)               "   Where to get your medicines      These medications were sent to [facility] [place], [place] - [address]  [address], [place] [address]     Phone:  [phone]     anastrozole 1 MG tablet          Primary Care Provider Office Phone # Fax #    Ginette Celia Lemus PA-C [phone]       [facility] [address]  [facility] [address]        Equal Access to Services     SURY BAILEY : Hadii aad ku hadasho Soomaali, waaxda luqadaha, qaybta kaalmada adeegyada, waxay idiin hayaan adeeg kharakushal soto . So [facility] [phone].    ATENCIÓN: Si habla español, tiene a hernandez disposición servicios gratuitos de asistencia lingüística. [facility] [phone].    We comply with applicable federal civil rights laws and [place] laws. We do not discriminate on the basis of race, color, national origin, age, disability sex, sexual orientation or gender identity.            Thank you!     Thank you for choosing [facility]  for your care. Our goal is always to provide you with excellent care. Hearing back from our patients is one way we can continue to improve our services. Please take a few minutes to complete the written survey that you may receive in the mail after your visit with us. Thank you!             Your Updated Medication List - Protect others around you: Learn how to safely use, store and throw away your medicines at www.disposemymeds.org.          This list is accurate as of: 7/17/17 10:30 AM.  Always use your most recent med list.                   Brand Name Dispense Instructions for use Diagnosis    anastrozole 1 MG tablet    ARIMIDEX    90 tablet    Take 1 tablet (1 mg) by mouth daily    Malignant neoplasm of upper-inner quadrant of right breast in female, estrogen receptor positive (H)       CALCIUM + D3 PO      Take 1 tablet by mouth daily Dose 500 - 500 mg        MULTIVITAMIN & MINERAL PO      Take 1 tablet by mouth daily.

## 2017-07-17 NOTE — PROGRESS NOTES
ONCOLOGIC HISTORY:  Wanda Dockery is a female with stage IIB (pT2 pN1a) right breast cancer. ER positive and HER-2/mario positive and stage II (pT2 pN0 M0) colon cancer.  1. Bilateral diagnostic mammograms and right breast ultrasound on 06/23/2015 for painless lump revealed a 3 cm lesion at 1 o'clock position and right axillary lymphadenopathy.   2. Ultrasound-guided biopsy of right breast and axillary lymph node on 06/29/2015 revealed grade 3 invasive ductal carcinoma, both in the breast and axillary lymph node. ER positive and HER-2/mario equivocal by FISH. IHC is 3+ in the right breast. In the right lymph node IHC is 2+.  3. Bone scan on 07/07/2015 did not reveal any bone metastasis.   4. CT of the chest, abdomen and pelvis on 07/07/2015 revealed right breast lesion and 1.2 x 2 cm right axillary lymph node. There is thickening of the wall of the hepatitic flexure of the colon with associated inflammatory changes and multiple mildly prominent lymph nodes in the mesentery.   6. Colonoscopy on 07/13/2015 revealed fungating, ulcerated, partially obstructing mass at the hepatic flexure and a sessile polyp in the cecum.  Hepatic flexure mass revealed poorly differentiated adenocarcinoma. 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 There is absence of expression of DNA mismatch repair protein MLH1 and PMS2.   7. Right breast lumpectomy, right lymph node dissection and right colectomy was done on 07/16/2015.    -Hemicolectomy specimen revealed high-grade adenocarcinoma measuring 7.5 cm extending into the santi-intestinal fatty tissue. Margins were negative. No lymphovascular invasion. Twenty-one benign lymph nodes. She has stage II (pT2 pN0 M0) colon cancer.   -Right breast reveals invasive ductal carcinoma, grade 2. It measures 2.1 cm. There was no DCIS. There was lymphovascular invasion present. Margins positive for invasive carcinoma. One of 7 lymph nodes positive. No extranodal extension. The patient has stage IIB (pT2 pN1a)  breast cancer.   8. Reexcision of the lumpectomy site on 07/29/2015. There was no residual malignancy.   9. Weekly Taxol and Herceptin x 12 was given between 08/17/2015 and 11/02/2015. Herceptin q3 weeks started on 11/09/2015. One year of herceptin completed on 08/08/2016.  10. Anastrazole started 11/30/2015.  11. DEXA scan on 04/25/2016 reveals osteopenia.  12. The patient received radiation for breast cancer.  5040 cGy between 04/18/2016 and 06/15/2016.    13. CT of the chest, abdomen and pelvis on 07/19/2016 does not reveal any evidence of malignancy.  There is a tiny stable nodule in the left lower lobe.    14. Colonoscopy on 08/01/2016 revealed tiny polyp.  Pathology revealed tubular adenoma.   15. Patient does not want bisphosphonate or prolia     SUBJECTIVE:    Ms. Wanda Dockery is an 82-year-old female who was diagnosed with right breast cancer and colon cancer in 2015.  The patient had a right breast lumpectomy along with right lymph node dissection and right colectomy in 07/2015.  She had a stage IIB right breast invasive ductal carcinoma ER positive and HER-2/mario positive.  She had stage II colon cancer.  For breast cancer, the patient received weekly Taxol with Herceptin x 12 weeks, followed by 1 year of Herceptin.  She also received radiation.  She is on anastrozole since 11/2015.  For colon cancer, no adjuvant chemotherapy was recommended.      The patient is doing well.  She has no complaints.  No headache.  No dizziness. No chest pain or difficulty breathing.  No abdominal pain, nausea or vomiting.  No urinary or bowel complaints.  No bleeding.  No fever, chills or night sweats.  Appetite is good.  Weight is stable.    CT chest, abdomen and pelvis was done on 07/11/2017.  No evidence of recurrence of malignancy.  There is heterogeneous 1.9 cm mass in the left lobe of thyroid.  There is 5 mm nodule in left lower lobe.  These are stable.      PHYSICAL EXAMINATION:   Alert and oriented x 3.   VITAL SIGNS:  Reviewed.   EYES: No icterus.   THROAT: No ulcer or thrush.   NECK: Supple. No lymphadenopathy or thyromegaly.   AXILLAE: No lymphadenopathy.   LUNGS: Good air entry bilaterally. No crackles or wheezing.   HEART: Regular. No murmur.   ABDOMEN: Soft. Nontender. No mass.   EXTREMITIES: No edema. No calf swelling or tenderness.   SKIN: No rash.           LABORATORY DATA:  Reviewed.       ASSESSMENT:   1.  An 82-year-old female with history of stage IIB right breast invasive carcinoma.  The patient is on anastrozole.  No evidence of recurrence.   2.  Stage II colon cancer, status post right hemicolectomy.  No evidence of recurrence.   3.  Osteopenia.   4.  Thyroid nodule.      PLAN:   1.  CT scan and lab was reviewed with the patient.  She was very happy to know that there is no evidence of recurrence of malignancy.    She is doing well from breast cancer.  She is on anastrozole.  She is tolerating it well.  She will continue on it.    She is doing well from colon cancer.  No suspicion of recurrence.  CEA is normal.     2.  CT scan reveals thyroid nodule.  We will get ultrasound of thyroid and also biopsy.     3.  The patient has osteopenia.  I have discussed with her regarding bisphosphonate and Prolia.  She does not want them. Discussed regarding repeat bone density.  She is agreeable for it.  It will be scheduled.  She'll continue on calcium and vitamin D.    4.  I will see her after thyroid biopsy.

## 2017-07-17 NOTE — PROGRESS NOTES
"Oncology Rooming Note    July 17, 2017 9:35 AM   Wanda Dockery is a 82 year old female who presents for:    No chief complaint on file.    Initial Vitals: /70 (BP Location: Left arm, Patient Position: Sitting, Cuff Size: Adult Regular)  Pulse 77  Temp 97.7  F (36.5  C) (Oral)  Resp 16  Wt 64 kg (141 lb)  SpO2 96%  BMI 22.76 kg/m2 Estimated body mass index is 22.76 kg/(m^2) as calculated from the following:    Height as of 8/29/16: 1.676 m (5' 6\").    Weight as of this encounter: 64 kg (141 lb). Body surface area is 1.73 meters squared.  No Pain (0) Comment: Data Unavailable   No LMP recorded. Patient is postmenopausal.  Allergies reviewed: Yes  Medications reviewed: Yes    Medications: MEDICATION REFILLS NEEDED TODAY. Provider was notified.  Pharmacy name entered into [facility]: [facility], [place] - [address]    Clinical concerns: Please refill ARIMIDEX, at the pharmacy here. Thanks. Clark was notified.    10 minutes for nursing intake (face to face time)     Jessica Rhoades MA              DISCHARGE PLAN:  Next appointments: See patient instruction section  Departure Mode: Ambulatory  Accompanied by:   Escorted to lobby for scheduling future appts., with Lebron or Raiza. Explained to both schedulers pt would like instructions.  4 minutes for nursing discharge (face to face time)   Jessica Rhoades MA      "

## 2017-08-02 ENCOUNTER — OFFICE VISIT (OUTPATIENT)
Dept: FAMILY MEDICINE | Facility: CLINIC | Age: 82
End: 2017-08-02
Payer: COMMERCIAL

## 2017-08-02 VITALS
DIASTOLIC BLOOD PRESSURE: 67 MMHG | WEIGHT: 142 LBS | HEART RATE: 78 BPM | BODY MASS INDEX: 22.82 KG/M2 | TEMPERATURE: 97.5 F | OXYGEN SATURATION: 95 % | SYSTOLIC BLOOD PRESSURE: 106 MMHG | HEIGHT: 66 IN
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
## 2017-08-02 PROCEDURE — 99213 OFFICE O/P EST LOW 20 MIN: CPT | Performed by: PHYSICIAN ASSISTANT

## 2017-08-02 RX ORDER — CLOBETASOL PROPIONATE 0.5 MG/G
CREAM TOPICAL
Qty: 15 G | Refills: 0 | Status: SHIPPED | OUTPATIENT
Start: 2017-08-02 | End: 2021-12-28

## 2017-08-02 NOTE — PROGRESS NOTES
HPI: This is an 83 yo female here with 3 skin lesions which have occurred about a week apart since end of July.    She was staying [place] a cabin and developed an itchy lesion L ankle that turned into a large blister  She did see Dr. Goldstein in derm who felt this was a spider bite  Then a week later developed a simlar lesion behind her L knee  Now since last night developed an itchy lesion L inner thigh with blister formation  Once the blister forms and pops the itching resolves  Denies any crust or purulent appearing d/c  Feels well without fever, chills or body aches.  Denies any tick bites.    Past Medical History:   Diagnosis Date     Breast cancer (H) 07/2015     Colon cancer (H) 7/2015     Hyperlipidemia LDL goal < 130      Osteopenia      Ovarian tumor (benign)     s/p unilateral oophorectomy     S/P lumpectomy of breast     non-malignant     Past Surgical History:   Procedure Laterality Date     BIOPSY Right 6/29/2015    Right Breast and Right Axillary Node     CATARACT IOL, RT/LT       COLONOSCOPY N/A 7/13/2015    Procedure: COMBINED COLONOSCOPY, SINGLE OR MULTIPLE BIOPSY/POLYPECTOMY BY BIOPSY;  Surgeon: Napoleon Quinn MD;  Location:  GI     INSERT PORT VASCULAR ACCESS N/A 8/14/2015    Procedure: INSERT PORT VASCULAR ACCESS;  Surgeon: Abdifatah Staples MD;  Location: [facility]     LAPAROSCOPIC ASSISTED COLECTOMY Right 7/16/2015    Procedure: LAPAROSCOPIC ASSISTED COLECTOMY;  Surgeon: Abdifatah Staples MD;  Location:  OR     LAPAROSCOPIC OOPHORECTOMY      for benign growth     LUMPECTOMY BREAST      Right breast - Benign  1950's     LUMPECTOMY BREAST Right 7/29/2015    Procedure: LUMPECTOMY BREAST;  Surgeon: Abdifatah Staples MD;  Location: [facility]     LUMPECTOMY BREAST, DISSECT AXILLARY NODE(S), COMBINED Right 7/16/2015    Procedure: COMBINED LUMPECTOMY BREAST, DISSECT AXILLARY NODE(S);  Surgeon: Abdifatah Staples MD;  Location:  OR     REMOVE PORT VASCULAR ACCESS N/A 8/29/2016    Procedure: REMOVE PORT VASCULAR  "ACCESS;  Surgeon: Abdifatah Staples MD;  Location: [facility]     Social History   Substance Use Topics     Smoking status: Never Smoker     Smokeless tobacco: Never Used     Alcohol use 0.0 oz/week     0 Standard drinks or equivalent per week      Comment: maybe once or twice a month     Current Outpatient Prescriptions   Medication Sig Dispense Refill     clobetasol (TEMOVATE) 0.05 % cream Apply sparingly to affected area twice daily for 14 days.  Do not apply to face. 15 g 0     anastrozole (ARIMIDEX) 1 MG tablet Take 1 tablet (1 mg) by mouth daily 90 tablet 3     Calcium Carb-Cholecalciferol (CALCIUM + D3 PO) Take 1 tablet by mouth daily Dose 500 - 500 mg       Multiple Vitamins-Minerals (MULTIVITAMIN & MINERAL PO) Take 1 tablet by mouth daily.       Allergies   Allergen Reactions     Azithromycin      Back and leg pains       PHYSICAL EXAM:    /67 (BP Location: Left arm, Cuff Size: Adult Regular)  Pulse 78  Temp 97.5  F (36.4  C) (Oral)  Ht 5' 6\" (1.676 m)  Wt 142 lb (64.4 kg)  SpO2 95%  BMI 22.92 kg/m2    Patient appears non toxic  L inner thigh bulla which pops during exam today. There is a coin sized area of eyrthema around this area    Assessment and Plan:     (W57.XXXA) Insect bite, initial encounter  (primary encounter diagnosis)  Comment:   Plan: clobetasol (TEMOVATE) 0.05 % cream        Bid for up to 2 weeks. Call if develops more lesions.      Ginette Lemus PA-C        "

## 2017-08-02 NOTE — MR AVS SNAPSHOT
After Visit Summary   8/2/2017    Wanda Dockery    MRN: [id number]           Patient Information     Date Of Birth          1935        Visit Information        Provider Department      8/2/2017 2:30 PM Ginette Lemus PA-C [facility]        Today's Diagnoses     Insect bite, initial encounter    -  1       Follow-ups after your visit        Your next 10 appointments already scheduled     Aug 07, 2017  9:00 AM CDT   DX HIP/PELVIS/SPINE with [id number]   [facility] ([facility])    [address] [address]   [phone]           Please do not take any of the following 24 hours prior to the day of your exam: vitamins, calcium tablets, antacids.  If possible, please wear clothes without metal (snaps, zippers). A sweatsuit works well.            Aug 07, 2017 10:00 AM CDT   US THYROID with SHUS1   [facility] ([facility])    [address] [facility] [address]   [phone]           Please bring a list of your medicines (including vitamins, minerals and over-the-counter drugs). Also, tell your doctor about any allergies you may have. Wear comfortable clothes and leave your valuables at home.  You do not need to do anything special to prepare for your exam.  Please call the Imaging Department at your exam site with any questions.            Aug 07, 2017 10:30 AM CDT   US BIOPSY THYROID FINE NEEDLE ASPIRATION with SHUS4   [facility] ([facility])    [address] [facility] [address]   [phone]           Tell us in advance if there s any chance you may be pregnant.  Bring a list of your medicines to the exam. Include vitamins, minerals and over-the-counter drugs.  If you take blood thinners, you may need to stop taking them a few days before treatment. Talk to your doctor before stopping these medicines. You will need a blood test the  morning of your exam.   Stop taking Coumadin (warfarin) 3 days before your exam. Restart the day after your exam.   If you take aspirin, you may need to stop taking it 3 days before your scan.   If you take Plavix, Ticlid, Pletal or Persantine, you may need to stop taking them 5 days before your scan. Please talk to your doctor before stopping these medicines.  If you will receive sedation for this test (medicine to help you relax):   See your family doctor for an exam within 30 days of treatment.   Plan for an adult to drive you home and stay with you for at least 6 hours.   Follow the eating and drinking guidelines checked below:   No eating or drinking for 4 hours before your test. You may take medicine with small sips of water.   If you have diabetes:If you take insulin, call your diabetes care team. Do not take diabetes pills on the morning of your test. If you take metformin (Avandamet, Glucophage, Glucovance, Metaglip) and received contrast, wait 48 hours before re-starting this medicine.  Please call the Imaging Department at your exam site with any questions.            Aug 14, 2017 10:00 AM CDT   Return Visit with Angie Rodas MD   [facility] ([facility])    [facility]  [address]  [facility] [address] [phone]              Who to contact     If you have questions or need follow up information about today's clinic visit or your schedule please contact [facility] directly at [phone].  Normal or non-critical lab and imaging results will be communicated to you by MyChart, letter or phone within 4 business days after the clinic has received the results. If you do not hear from us within 7 days, please contact the clinic through MyChart or phone. If you have a critical or abnormal lab result, we will notify you by phone as soon as possible.  Submit refill requests through [employer] or call your pharmacy and they will forward  "the refill request to us. Please allow 3 business days for your refill to be completed.          Additional Information About Your Visit        Care EveryWhere ID     This is your Care EveryWhere ID. This could be used by other organizations to access your [place] medical records  [id number]        Your Vitals Were     Pulse Temperature Height Pulse Oximetry BMI (Body Mass Index)       78 97.5  F (36.4  C) (Oral) 5' 6\" (1.676 m) 95% 22.92 kg/m2        Blood Pressure from Last 3 Encounters:   08/02/17 106/67   07/17/17 113/70   01/30/17 119/77    Weight from Last 3 Encounters:   08/02/17 142 lb (64.4 kg)   07/17/17 141 lb (64 kg)   01/30/17 146 lb 9.6 oz (66.5 kg)              Today, you had the following     No orders found for display         Today's Medication Changes          These changes are accurate as of: 8/2/17  2:43 PM.  If you have any questions, ask your nurse or doctor.               Start taking these medicines.        Dose/Directions    clobetasol 0.05 % cream   Commonly known as:  TEMOVATE   Used for:  Insect bite, initial encounter   Started by:  Ginette Lemus PA-C        Apply sparingly to affected area twice daily for 14 days.  Do not apply to face.   Quantity:  15 g   Refills:  0            Where to get your medicines      These medications were sent to [facility] - [address]  [address], [place] [address]     Phone:  [phone]     clobetasol 0.05 % cream                Primary Care Provider Office Phone # Fax #    Ginette Lemus PA-C [phone]       [facility] [address]  [facility] [address]        Equal Access to Services     SURY BAILEY : Amber ambrizo Sojohn, waaxda luqadaha, qaybta kaalmada adeegyada, marie ross. So [facility] [phone].    ATENCIÓN: Si habla español, tiene a hernandez disposición servicios gratuitos de asistencia lingüística. Llame al " [phone].    We comply with applicable federal civil rights laws and [place] laws. We do not discriminate on the basis of race, color, national origin, age, disability sex, sexual orientation or gender identity.            Thank you!     Thank you for choosing [facility]  for your care. Our goal is always to provide you with excellent care. Hearing back from our patients is one way we can continue to improve our services. Please take a few minutes to complete the written survey that you may receive in the mail after your visit with us. Thank you!             Your Updated Medication List - Protect others around you: Learn how to safely use, store and throw away your medicines at www.disposemymeds.org.          This list is accurate as of: 8/2/17  2:43 PM.  Always use your most recent med list.                   Brand Name Dispense Instructions for use Diagnosis    anastrozole 1 MG tablet    ARIMIDEX    90 tablet    Take 1 tablet (1 mg) by mouth daily    Malignant neoplasm of upper-inner quadrant of right breast in female, estrogen receptor positive (H)       CALCIUM + D3 PO      Take 1 tablet by mouth daily Dose 500 - 500 mg        clobetasol 0.05 % cream    TEMOVATE    15 g    Apply sparingly to affected area twice daily for 14 days.  Do not apply to face.    Insect bite, initial encounter       MULTIVITAMIN & MINERAL PO      Take 1 tablet by mouth daily.

## 2017-08-02 NOTE — NURSING NOTE
"Chief Complaint   Patient presents with     Insect Bites     left leg       Initial /67 (BP Location: Left arm, Cuff Size: Adult Regular)  Pulse 78  Temp 97.5  F (36.4  C) (Oral)  Ht 5' 6\" (1.676 m)  Wt 142 lb (64.4 kg)  SpO2 95%  BMI 22.92 kg/m2 Estimated body mass index is 22.92 kg/(m^2) as calculated from the following:    Height as of this encounter: 5' 6\" (1.676 m).    Weight as of this encounter: 142 lb (64.4 kg).  Medication Reconciliation: complete.      Ermelinda Bennett CMA      "

## 2017-08-07 ENCOUNTER — HOSPITAL ENCOUNTER (OUTPATIENT)
Dept: ULTRASOUND IMAGING | Facility: CLINIC | Age: 82
End: 2017-08-07
Attending: INTERNAL MEDICINE
Payer: COMMERCIAL

## 2017-08-07 ENCOUNTER — HOSPITAL ENCOUNTER (OUTPATIENT)
Facility: CLINIC | Age: 82
Discharge: HOME | End: 2017-08-07
Attending: INTERNAL MEDICINE | Admitting: INTERNAL MEDICINE
Payer: COMMERCIAL

## 2017-08-07 ENCOUNTER — HOSPITAL ENCOUNTER (OUTPATIENT)
Dept: BONE DENSITY | Facility: CLINIC | Age: 82
End: 2017-08-07
Attending: INTERNAL MEDICINE
Payer: COMMERCIAL

## 2017-08-07 VITALS
SYSTOLIC BLOOD PRESSURE: 125 MMHG | DIASTOLIC BLOOD PRESSURE: 74 MMHG | RESPIRATION RATE: 18 BRPM | OXYGEN SATURATION: 98 % | HEART RATE: 70 BPM

## 2017-08-07 DIAGNOSIS — E04.1 THYROID NODULE: ICD-10-CM

## 2017-08-07 DIAGNOSIS — M85.89 OSTEOPENIA OF MULTIPLE SITES: ICD-10-CM

## 2017-08-07 PROCEDURE — 77080 DXA BONE DENSITY AXIAL: CPT

## 2017-08-07 PROCEDURE — 88173 CYTOPATH EVAL FNA REPORT: CPT | Mod: 26 | Performed by: INTERNAL MEDICINE

## 2017-08-07 PROCEDURE — 76536 US EXAM OF HEAD AND NECK: CPT

## 2017-08-07 PROCEDURE — 40000863 ZZH STATISTIC RADIOLOGY XRAY, US, CT, MAR, NM

## 2017-08-07 PROCEDURE — 88173 CYTOPATH EVAL FNA REPORT: CPT | Mod: 91 | Performed by: INTERNAL MEDICINE

## 2017-08-07 PROCEDURE — 76942 ECHO GUIDE FOR BIOPSY: CPT

## 2017-08-07 RX ORDER — LIDOCAINE 40 MG/G
CREAM TOPICAL
Status: DISCONTINUED | OUTPATIENT
Start: 2017-08-07 | End: 2017-08-07 | Stop reason: HOSPADM

## 2017-08-07 NOTE — IP AVS SNAPSHOT
[address] [place] [address]    Phone:  [phone]                                       After Visit Summary   8/7/2017    Wanda Dockery    MRN: [id number]           After Visit Summary Signature Page     I have received my discharge instructions, and my questions have been answered. I have discussed any challenges I see with this plan with the nurse or doctor.    ..........................................................................................................................................  Patient/Patient Representative Signature      ..........................................................................................................................................  Patient Representative Print Name and Relationship to Patient    ..................................................               ................................................  Date                                            Time    ..........................................................................................................................................  Reviewed by Signature/Title    ...................................................              ..............................................  Date                                                            Time

## 2017-08-07 NOTE — PROGRESS NOTES
Post procedure. VSS. Denies pain.  Biopsy site D/I. Discharge instructions done with pt. States understanding. Discharged per US.

## 2017-08-07 NOTE — DISCHARGE INSTRUCTIONS
FNA Thyroid/Lymph Node Biopsy Discharge Instructions     After you go home:      You may resume your normal diet    Care of Puncture Site:      You may have mild bruising, soreness & swelling at the puncture site. This will go away in a few days    For swelling & bruising, you may use an ice pack on the site.     Activity:      You may go back to your normal activity    Avoid strenuous activity for 24 hours    Medicines:      You may resume all medications    For minor pain, you may take Acetaminophen (Tylenol) or Ibuprofen (Advil)            Call the provider who ordered this test if:      Increased redness or swelling at the site    Fluid or blood oozing from the site    Severe pain at the site    Chills or a fever greater than 101 F (38 C).    Any questions or concerns    Call  911 or go to the Emergency Room if:      Trouble breathing    Your neck swells    Bleeding that you cannot control    Severe difficulty swallowing    If you have questions call:      [facility] Radiology Dept @ [phone]    The provider who performed your procedure was _________________.

## 2017-08-07 NOTE — IP AVS SNAPSHOT
MRN:[id number]                      After Visit Summary   8/7/2017    Wanda Dockery    MRN: [id number]           Visit Information        Department      8/7/2017  9:07 AM [facility] [place] [facility]          Review of your medicines      UNREVIEWED medicines. Ask your doctor about these medicines        Dose / Directions    anastrozole 1 MG tablet   Commonly known as:  ARIMIDEX   Used for:  Malignant neoplasm of upper-inner quadrant of right breast in female, estrogen receptor positive (H)        Dose:  1 mg   Take 1 tablet (1 mg) by mouth daily   Quantity:  90 tablet   Refills:  3       CALCIUM + D3 PO        Dose:  1 tablet   Take 1 tablet by mouth daily Dose 500 - 500 mg   Refills:  0       clobetasol 0.05 % cream   Commonly known as:  TEMOVATE   Used for:  Insect bite, initial encounter        Apply sparingly to affected area twice daily for 14 days.  Do not apply to face.   Quantity:  15 g   Refills:  0       MULTIVITAMIN & MINERAL PO        Dose:  1 tablet   Take 1 tablet by mouth daily.   Refills:  0                Protect others around you: Learn how to safely use, store and throw away your medicines at www.disposemymeds.org.         Follow-ups after your visit        Your next 10 appointments already scheduled     Aug 14, 2017 10:00 AM CDT   Return Visit with Angie Rodas MD   [facility] ([facility])    [facility]  [address] [address] [phone]               Care Instructions        Further instructions from your care team       FNA Thyroid/Lymph Node Biopsy Discharge Instructions     After you go home:      You may resume your normal diet    Care of Puncture Site:      You may have mild bruising, soreness & swelling at the puncture site. This will go away in a few days    For swelling & bruising, you may use an ice pack on the site.     Activity:      You may go back to your normal activity    Avoid  strenuous activity for 24 hours    Medicines:      You may resume all medications    For minor pain, you may take Acetaminophen (Tylenol) or Ibuprofen (Advil)            Call the provider who ordered this test if:      Increased redness or swelling at the site    Fluid or blood oozing from the site    Severe pain at the site    Chills or a fever greater than 101 F (38 C).    Any questions or concerns    Call  911 or go to the Emergency Room if:      Trouble breathing    Your neck swells    Bleeding that you cannot control    Severe difficulty swallowing    If you have questions call:      [facility] Radiology Dept @ [phone]    The provider who performed your procedure was _________________.     Additional Information About Your Visit        Care EveryWhere ID     This is your Care EveryWhere ID. This could be used by other organizations to access your [facility] medical records  [id number]         Primary Care Provider Office Phone # Fax #    Ginette Lemus PA-C [phone]      Equal Access to Services     SURY BAILEY : Hadii madiha baron hadasho Soomaali, waaxda luqadaha, qaybta kaalmada adeegyada, waxay selene soto . So [facility] [phone].    ATENCIÓN: Si habla español, tiene a hernandez disposición servicios gratuitos de asistencia lingüística. Ll[facility]tering [facility] [phone].    We comply with applicable federal civil rights laws and [place] laws. We do not discriminate on the basis of race, color, national origin, age, disability sex, sexual orientation or gender identity.            Thank you!     Thank you for choosing [facility] for your care. Our goal is always to provide you with excellent care. Hearing back from our patients is one way we can continue to improve our services. Please take a few minutes to complete the written survey that you may receive in the mail after you visit with us. Thank you!             Medication List: This is a list of all your medications and when  to take them. Check marks below indicate your daily home schedule. Keep this list as a reference.      Medications           Morning Afternoon Evening Bedtime As Needed    anastrozole 1 MG tablet   Commonly known as:  ARIMIDEX   Take 1 tablet (1 mg) by mouth daily                                CALCIUM + D3 PO   Take 1 tablet by mouth daily Dose 500 - 500 mg                                clobetasol 0.05 % cream   Commonly known as:  TEMOVATE   Apply sparingly to affected area twice daily for 14 days.  Do not apply to face.                                MULTIVITAMIN & MINERAL PO   Take 1 tablet by mouth daily.

## 2017-08-07 NOTE — PROGRESS NOTES
Care Suites Arrival    Reason for Visit: Thyroid FNA    A/O. Denies pain. D/C instructions reviewed with pt with verbal understanding received. Copy given to pt.  All questions & concerns addressed. Pt resting on cart, denies additional needs at this time.  in lobby.

## 2017-08-08 LAB — COPATH REPORT: NORMAL

## 2017-08-14 ENCOUNTER — ONCOLOGY VISIT (OUTPATIENT)
Dept: ONCOLOGY | Facility: CLINIC | Age: 82
End: 2017-08-14
Attending: INTERNAL MEDICINE
Payer: COMMERCIAL

## 2017-08-14 VITALS
TEMPERATURE: 97.8 F | BODY MASS INDEX: 23.02 KG/M2 | OXYGEN SATURATION: 97 % | HEART RATE: 79 BPM | DIASTOLIC BLOOD PRESSURE: 64 MMHG | WEIGHT: 142.6 LBS | SYSTOLIC BLOOD PRESSURE: 112 MMHG | RESPIRATION RATE: 16 BRPM

## 2017-08-14 DIAGNOSIS — C50.211 MALIGNANT NEOPLASM OF UPPER-INNER QUADRANT OF RIGHT BREAST IN FEMALE, ESTROGEN RECEPTOR POSITIVE (H): ICD-10-CM

## 2017-08-14 DIAGNOSIS — Z17.0 MALIGNANT NEOPLASM OF UPPER-INNER QUADRANT OF RIGHT BREAST IN FEMALE, ESTROGEN RECEPTOR POSITIVE (H): ICD-10-CM

## 2017-08-14 DIAGNOSIS — C18.9 MALIGNANT NEOPLASM OF COLON, UNSPECIFIED PART OF COLON (H): Primary | ICD-10-CM

## 2017-08-14 PROCEDURE — 99214 OFFICE O/P EST MOD 30 MIN: CPT | Performed by: INTERNAL MEDICINE

## 2017-08-14 PROCEDURE — 99211 OFF/OP EST MAY X REQ PHY/QHP: CPT

## 2017-08-14 ASSESSMENT — PAIN SCALES - GENERAL: PAINLEVEL: NO PAIN (0)

## 2017-08-14 NOTE — MR AVS SNAPSHOT
After Visit Summary   8/14/2017    Wanda Dockery    MRN: [id number]           Patient Information     Date Of Birth          1935        Visit Information        Provider Department      8/14/2017 10:00 AM Angie Rodas MD [facility]        Today's Diagnoses     Malignant neoplasm of colon, unspecified part of colon (H)    -  1    Malignant neoplasm of upper-inner quadrant of right breast in female, estrogen receptor positive (H)          Care Instructions    Continue anastrazole.  Continue calcium and vitamin D.  Follow up in 6 months with labs.          Follow-ups after your visit        Your next 10 appointments already scheduled     Feb 09, 2018  4:00 PM CST   Return Visit with  Oncology Nurse   [facility] ([facility])    [facility]  [address]  [place] [address] [phone]            Feb 12, 2018 10:00 AM CST   Return Visit with Angie Rodas MD   [facility] ([facility])    [facility]  [address]  [place] [address] [phone]              Future tests that were ordered for you today     Open Future Orders        Priority Expected Expires Ordered    CBC with platelets Routine 2/14/2018 4/14/2018 8/14/2017    Comprehensive metabolic panel Routine 2/14/2018 4/14/2018 8/14/2017    CEA Routine 2/14/2018 4/14/2018 8/14/2017            Who to contact     If you have questions or need follow up information about today's clinic visit or your schedule please contact [facility] directly at [phone].  Normal or non-critical lab and imaging results will be communicated to you by MyChart, letter or phone within 4 business days after the clinic has received the results. If you do not hear from us within 7 days, please contact the clinic through MyChart or phone. If you have a critical or abnormal lab result, we will notify you by  phone as soon as possible.  Submit refill requests through [employer] or call your pharmacy and they will forward the refill request to us. Please allow 3 business days for your refill to be completed.          Additional Information About Your Visit        Care EveryWhere ID     This is your Care EveryWhere ID. This could be used by other organizations to access your [facility] medical records  [id number]        Your Vitals Were     Pulse Temperature Respirations Pulse Oximetry BMI (Body Mass Index)       79 97.8  F (36.6  C) (Oral) 16 97% 23.02 kg/m2        Blood Pressure from Last 3 Encounters:   08/14/17 112/64   08/07/17 125/74   08/02/17 106/67    Weight from Last 3 Encounters:   08/14/17 64.7 kg (142 lb 9.6 oz)   08/02/17 64.4 kg (142 lb)   07/17/17 64 kg (141 lb)               Primary Care Provider Office Phone # Fax #    Ginette Lemus PA-C [phone] [address]  [place] [address]        Equal Access to Services     [facility]: Hadii aad ku hadasho Soomaali, waaxda luqadaha, qaybta kaalmada ademike, marie soto . So [facility] [phone].    ATENCIÓN: Si habla español, tiene a hernandez disposición servicios gratuitos de asistencia lingüística. [facility] [phone].    We comply with applicable federal civil rights laws and [place] laws. We do not discriminate on the basis of race, color, national origin, age, disability sex, sexual orientation or gender identity.            Thank you!     Thank you for choosing [facility]  for your care. Our goal is always to provide you with excellent care. Hearing back from our patients is one way we can continue to improve our services. Please take a few minutes to complete the written survey that you may receive in the mail after your visit with us. Thank you!             Your Updated Medication List - Protect others around you: Learn how to safely use, store and throw away your medicines at  www.disposemymeds.org.          This list is accurate as of: 8/14/17 10:30 AM.  Always use your most recent med list.                   Brand Name Dispense Instructions for use Diagnosis    anastrozole 1 MG tablet    ARIMIDEX    90 tablet    Take 1 tablet (1 mg) by mouth daily    Malignant neoplasm of upper-inner quadrant of right breast in female, estrogen receptor positive (H)       CALCIUM + D3 PO      Take 1 tablet by mouth daily Dose 500 - 500 mg        clobetasol 0.05 % cream    TEMOVATE    15 g    Apply sparingly to affected area twice daily for 14 days.  Do not apply to face.    Insect bite, initial encounter       MULTIVITAMIN & MINERAL PO      Take 1 tablet by mouth daily.

## 2017-08-14 NOTE — PROGRESS NOTES
"Oncology Rooming Note    August 14, 2017 9:55 AM   Wanda Dockery is a 82 year old female who presents for:    Chief Complaint   Patient presents with     Oncology Clinic Visit     Cancer of right colon      Initial Vitals: /64 (BP Location: Left arm, Patient Position: Sitting, Cuff Size: Adult Regular)  Pulse 79  Temp 97.8  F (36.6  C) (Oral)  Resp 16  Wt 64.7 kg (142 lb 9.6 oz)  SpO2 97%  BMI 23.02 kg/m2 Estimated body mass index is 23.02 kg/(m^2) as calculated from the following:    Height as of 8/2/17: 1.676 m (5' 6\").    Weight as of this encounter: 64.7 kg (142 lb 9.6 oz). Body surface area is 1.74 meters squared.  No Pain (0) Comment: Data Unavailable   No LMP recorded. Patient is postmenopausal.  Allergies reviewed: Yes  Medications reviewed: Yes    Medications: Medication refills not needed today.  Pharmacy name entered into [facility]: [facility] [place] - [address]    Clinical concerns: None                    4 minutes for nursing intake (face to face time)     Asya Ibarra MA    DISCHARGE PLAN:  1.) Patient to be scheduled for labs and follow up in 6 months  Next appointments: See patient instruction section  Departure Mode: Ambulatory  Accompanied by:    5 minutes for nursing discharge (face to face time)   Latasha Ayala RN            "

## 2017-08-14 NOTE — PATIENT INSTRUCTIONS
Continue anastrazole.  Continue calcium and vitamin D.  Follow up in 6 months with labs.   Done - Raiza    AVS given to patient - Raiza

## 2017-08-15 NOTE — PROGRESS NOTES
ONCOLOGIC HISTORY:  Wanda Dockery is a female with stage IIB (pT2 pN1a) right breast cancer. ER positive and HER-2/mario positive and stage II (pT2 pN0 M0) colon cancer.  1. Bilateral diagnostic mammograms and right breast ultrasound on 06/23/2015 for painless lump revealed a 3 cm lesion at 1 o'clock position and right axillary lymphadenopathy.   2. Ultrasound-guided biopsy of right breast and axillary lymph node on 06/29/2015 revealed grade 3 invasive ductal carcinoma, both in the breast and axillary lymph node. ER positive and HER-2/mario equivocal by FISH. IHC is 3+ in the right breast. In the right lymph node IHC is 2+.  3. Bone scan on 07/07/2015 did not reveal any bone metastasis.   4. CT of the chest, abdomen and pelvis on 07/07/2015 revealed right breast lesion and 1.2 x 2 cm right axillary lymph node. There is thickening of the wall of the hepatitic flexure of the colon with associated inflammatory changes and multiple mildly prominent lymph nodes in the mesentery.   6. Colonoscopy on 07/13/2015 revealed fungating, ulcerated, partially obstructing mass at the hepatic flexure and a sessile polyp in the cecum.  Hepatic flexure mass revealed poorly differentiated adenocarcinoma. 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 There is absence of expression of DNA mismatch repair protein MLH1 and PMS2.   7. Right breast lumpectomy, right lymph node dissection and right colectomy was done on 07/16/2015.    -Hemicolectomy specimen revealed high-grade adenocarcinoma measuring 7.5 cm extending into the santi-intestinal fatty tissue. Margins were negative. No lymphovascular invasion. Twenty-one benign lymph nodes. She has stage II (pT2 pN0 M0) colon cancer.   -Right breast reveals invasive ductal carcinoma, grade 2. It measures 2.1 cm. There was no DCIS. There was lymphovascular invasion present. Margins positive for invasive carcinoma. One of 7 lymph nodes positive. No extranodal extension. The patient has stage IIB (pT2 pN1a)  breast cancer.   8. Reexcision of the lumpectomy site on 07/29/2015. There was no residual malignancy.   9. Weekly Taxol and Herceptin x 12 was given between 08/17/2015 and 11/02/2015. Herceptin q3 weeks started on 11/09/2015. One year of herceptin completed on 08/08/2016.  10. Anastrazole started 11/30/2015.  11. DEXA scan on 04/25/2016 reveals osteopenia.  12. The patient received radiation for breast cancer.  5040 cGy between 04/18/2016 and 06/15/2016.    13. CT of the chest, abdomen and pelvis on 07/19/2016 does not reveal any evidence of malignancy.  There is a tiny stable nodule in the left lower lobe.    14. Colonoscopy on 08/01/2016 revealed tiny polyp.  Pathology revealed tubular adenoma.   15. Patient does not want bisphosphonate or prolia.  16.  CT chest, abdomen and pelvis on 07/11/2017 does not reveal any evidence of malignancy.  There is a stable 5 mm left lower lobe nodule.  There is heterogeneous 1.9 cm mass in the left lobe of thyroid.  17. Thyroid ultrasound on 08/07/2017 revealed a 3.3 cm hypoechoic nodule in the left thyroid lobe.    -Ultrasound guided FNA was done on 08/07/2017.  It is benign.   18. DEXA scan on 08/07/2017 reveals stable osteopenia.       SUBJECTIVE:    Ms. Wanda Dockery is an 82-year-old female with history of right breast cancer and colon cancer diagnosed in 2015.  She is here for follow-up on the result of thyroid biopsy. CT scan on 07/11/2017 had revealed heterogeneous 1.9 cm mass in the left lobe of thyroid.    Thyroid ultrasound on 08/07/2017 revealed a 3.3 cm hypoechoic nodule in the left thyroid lobe.  Ultrasound guided FNA was done on 08/07/2017.  It is benign.       DEXA scan on 08/07/2017 reveals stable osteopenia.      ASSESSMENT:   1.  An 82-year-old female with history of stage IIB right breast invasive carcinoma. She is on anastrozole.  No evidence of recurrence.   2.  Stage II colon cancer, status post right hemicolectomy.  No evidence of recurrence.   3.   Osteopenia. Stable.  4.  Thyroid nodule. FNA is benign.      PLAN:   1.  Explained to the patient that thyroid FNA is benign.  She was happy to know that.  Will monitor thyroid nodule once a year.  2.  Osteopenia is stable.  She does not want bisphosphonate and Prolia.  She will continue on calcium and vitamin D twice a day.  3.  She is doing well from breast cancer.  She'll continue on anastrozole.  4.  She is doing well from colon cancer. We will continue to monitor her with CT scan once a year.  5.  I'll see her in six months time with labs. Patient advised to return sooner if she has any lump, weight loss, unexplained pain, worsening weakness, change in the bowel habits, bone pain or any other concerns.

## 2018-02-06 DIAGNOSIS — Z17.0 MALIGNANT NEOPLASM OF UPPER-INNER QUADRANT OF RIGHT BREAST IN FEMALE, ESTROGEN RECEPTOR POSITIVE (H): ICD-10-CM

## 2018-02-06 DIAGNOSIS — C50.211 MALIGNANT NEOPLASM OF UPPER-INNER QUADRANT OF RIGHT BREAST IN FEMALE, ESTROGEN RECEPTOR POSITIVE (H): ICD-10-CM

## 2018-02-06 RX ORDER — ANASTROZOLE 1 MG/1
1 TABLET ORAL DAILY
Qty: 90 TABLET | Refills: 3 | Status: SHIPPED | OUTPATIENT
Start: 2018-02-06 | End: 2018-11-26

## 2018-03-02 ENCOUNTER — HOSPITAL ENCOUNTER (OUTPATIENT)
Facility: CLINIC | Age: 83
Setting detail: SPECIMEN
Discharge: HOME | End: 2018-03-02
Attending: INTERNAL MEDICINE | Admitting: INTERNAL MEDICINE
Payer: COMMERCIAL

## 2018-03-02 ENCOUNTER — ONCOLOGY VISIT (OUTPATIENT)
Dept: ONCOLOGY | Facility: CLINIC | Age: 83
End: 2018-03-02
Attending: INTERNAL MEDICINE
Payer: COMMERCIAL

## 2018-03-02 DIAGNOSIS — C18.9 MALIGNANT NEOPLASM OF COLON, UNSPECIFIED PART OF COLON (H): ICD-10-CM

## 2018-03-02 DIAGNOSIS — Z17.0 MALIGNANT NEOPLASM OF UPPER-INNER QUADRANT OF RIGHT BREAST IN FEMALE, ESTROGEN RECEPTOR POSITIVE (H): ICD-10-CM

## 2018-03-02 DIAGNOSIS — C50.211 MALIGNANT NEOPLASM OF UPPER-INNER QUADRANT OF RIGHT BREAST IN FEMALE, ESTROGEN RECEPTOR POSITIVE (H): ICD-10-CM

## 2018-03-02 LAB
ALBUMIN SERPL-MCNC: 3.8 G/DL (ref 3.4–5)
ALP SERPL-CCNC: 69 U/L (ref 40–150)
ALT SERPL W P-5'-P-CCNC: 25 U/L (ref 0–50)
ANION GAP SERPL CALCULATED.3IONS-SCNC: 6 MMOL/L (ref 3–14)
AST SERPL W P-5'-P-CCNC: 21 U/L (ref 0–45)
BILIRUB SERPL-MCNC: 0.4 MG/DL (ref 0.2–1.3)
BUN SERPL-MCNC: 20 MG/DL (ref 7–30)
CALCIUM SERPL-MCNC: 8.7 MG/DL (ref 8.5–10.1)
CEA SERPL-MCNC: 1.3 UG/L (ref 0–2.5)
CHLORIDE SERPL-SCNC: 105 MMOL/L (ref 94–109)
CO2 SERPL-SCNC: 28 MMOL/L (ref 20–32)
CREAT SERPL-MCNC: 0.84 MG/DL (ref 0.52–1.04)
ERYTHROCYTE [DISTWIDTH] IN BLOOD BY AUTOMATED COUNT: 13.1 % (ref 10–15)
GFR SERPL CREATININE-BSD FRML MDRD: 65 ML/MIN/1.7M2
GLUCOSE SERPL-MCNC: 82 MG/DL (ref 70–99)
HCT VFR BLD AUTO: 37.7 % (ref 35–47)
HGB BLD-MCNC: 13.3 G/DL (ref 11.7–15.7)
MCH RBC QN AUTO: 32.5 PG (ref 26.5–33)
MCHC RBC AUTO-ENTMCNC: 35.3 G/DL (ref 31.5–36.5)
MCV RBC AUTO: 92 FL (ref 78–100)
PLATELET # BLD AUTO: 287 10E9/L (ref 150–450)
POTASSIUM SERPL-SCNC: 3.7 MMOL/L (ref 3.4–5.3)
PROT SERPL-MCNC: 7.9 G/DL (ref 6.8–8.8)
RBC # BLD AUTO: 4.09 10E12/L (ref 3.8–5.2)
SODIUM SERPL-SCNC: 139 MMOL/L (ref 133–144)
WBC # BLD AUTO: 5.3 10E9/L (ref 4–11)

## 2018-03-02 PROCEDURE — 80053 COMPREHEN METABOLIC PANEL: CPT | Performed by: INTERNAL MEDICINE

## 2018-03-02 PROCEDURE — 82378 CARCINOEMBRYONIC ANTIGEN: CPT | Performed by: INTERNAL MEDICINE

## 2018-03-02 PROCEDURE — 36415 COLL VENOUS BLD VENIPUNCTURE: CPT

## 2018-03-02 PROCEDURE — 85027 COMPLETE CBC AUTOMATED: CPT | Performed by: INTERNAL MEDICINE

## 2018-03-02 NOTE — MR AVS SNAPSHOT
"              After Visit Summary   3/2/2018    Wanda Dockery    MRN: [id number]           Patient Information     Date Of Birth          1935        Visit Information        Provider Department      3/2/2018 3:00 PM Nurse, Rachelle Oncology [facility]        Today's Diagnoses     Malignant neoplasm of colon, unspecified part of colon (H)        Malignant neoplasm of upper-inner quadrant of right breast in female, estrogen receptor positive (H)           Follow-ups after your visit        Your next 10 appointments already scheduled     Mar 05, 2018 10:00 AM CST   Return Visit with Angie Rodas MD   [facility] ([facility])    [facility]  [address]  [facility] [address] [phone]              Who to contact     If you have questions or need follow up information about today's clinic visit or your schedule please contact [facility] directly at [phone].  Normal or non-critical lab and imaging results will be communicated to you by [employer] Inchart, letter or phone within 4 business days after the clinic has received the results. If you do not hear from us within 7 days, please contact the clinic through [employer] Inchart or phone. If you have a critical or abnormal lab result, we will notify you by phone as soon as possible.  Submit refill requests through [employer] or call your pharmacy and they will forward the refill request to us. Please allow 3 business days for your refill to be completed.          Additional Information About Your Visit        Innometrix Inchart Information     [employer] lets you send messages to your doctor, view your test results, renew your prescriptions, schedule appointments and more. To sign up, go to [email or web address] . Click on \"Log in\" on the left side of the screen, which will take you to the Welcome page. Then click on \"Sign up Now\" on the right side of the page.     You will be asked to enter the access code " listed below, as well as some personal information. Please follow the directions to create your username and password.     Your access code is: [email or web address]  Expires: 2018  3:07 PM     Your access code will  in 90 days. If you need help or a new code, please call your [facility] or [phone].        Care EveryWhere ID     This is your Care EveryWhere ID. This could be used by other organizations to access your [place] medical records  [id number]         Blood Pressure from Last 3 Encounters:   17 112/64   17 125/74   17 106/67    Weight from Last 3 Encounters:   17 64.7 kg (142 lb 9.6 oz)   17 64.4 kg (142 lb)   17 64 kg (141 lb)              We Performed the Following     CBC with platelets     CEA     Comprehensive metabolic panel        Primary Care Provider Office Phone # Fax #    Gientte Lemus PA-C [phone] [address]  [place] [address]        Equal Access to Services     [facility] : Hadii aad ku hadasho Soleroyali, waaxda luqadaha, qaybta kaalmada adegtyamanuel, marie soto . So [facility] [phone].    ATENCIÓN: Si habla español, tiene a hernandez disposición servicios gratuitos de asistencia lingüística. [facility] [phone].    We comply with applicable federal civil rights laws and [place] laws. We do not discriminate on the basis of race, color, national origin, age, disability, sex, sexual orientation, or gender identity.            Thank you!     Thank you for choosing [facility]  for your care. Our goal is always to provide you with excellent care. Hearing back from our patients is one way we can continue to improve our services. Please take a few minutes to complete the written survey that you may receive in the mail after your visit with us. Thank you!             Your Updated Medication List - Protect others around you: Learn how to safely use, store and throw away your  medicines at www.disposemymeds.org.          This list is accurate as of 3/2/18  3:07 PM.  Always use your most recent med list.                   Brand Name Dispense Instructions for use Diagnosis    anastrozole 1 MG tablet    ARIMIDEX    90 tablet    Take 1 tablet (1 mg) by mouth daily    Malignant neoplasm of upper-inner quadrant of right breast in female, estrogen receptor positive (H)       CALCIUM + D3 PO      Take 1 tablet by mouth daily Dose 500 - 500 mg        clobetasol 0.05 % cream    TEMOVATE    15 g    Apply sparingly to affected area twice daily for 14 days.  Do not apply to face.    Insect bite, initial encounter       MULTIVITAMIN & MINERAL PO      Take 1 tablet by mouth daily.

## 2018-03-02 NOTE — LETTER
3/2/2018         RE: Wanda Dockery  [address] [place] [address]        Dear Colleague,    Thank you for referring your patient, Wanda Dockery, to the [facility]. Please see a copy of my visit note below.    Medical Assistant Note:  Wanda Dockery presents today for lab visit.    Patient seen by provider today: No.   present during visit today: Not Applicable.    Concerns: No Concerns.    Procedure:  Lab draw site: RAC, Needle type: BF, Gauge: 21 g gauze and coban applied.    Post Assessment:  Labs drawn without difficulty: Yes.    Discharge Plan:  Departure Mode: Ambulatory.    Face to Face Time: 4.    Asya Ibarra MA            Again, thank you for allowing me to participate in the care of your patient.        Sincerely,        Oncology Nurse

## 2018-03-02 NOTE — PROGRESS NOTES
Medical Assistant Note:  Wanda LOPEZ Dockery presents today for lab visit.    Patient seen by provider today: No.   present during visit today: Not Applicable.    Concerns: No Concerns.    Procedure:  Lab draw site: RAC, Needle type: BF, Gauge: 21 g gauze and coban applied.    Post Assessment:  Labs drawn without difficulty: Yes.    Discharge Plan:  Departure Mode: Ambulatory.    Face to Face Time: 4.    Asya Ibarra MA

## 2018-03-05 ENCOUNTER — ONCOLOGY VISIT (OUTPATIENT)
Dept: ONCOLOGY | Facility: CLINIC | Age: 83
End: 2018-03-05
Attending: INTERNAL MEDICINE
Payer: COMMERCIAL

## 2018-03-05 ENCOUNTER — HOSPITAL ENCOUNTER (OUTPATIENT)
Facility: CLINIC | Age: 83
Setting detail: SPECIMEN
End: 2018-03-05
Attending: INTERNAL MEDICINE
Payer: COMMERCIAL

## 2018-03-05 VITALS
TEMPERATURE: 98.3 F | DIASTOLIC BLOOD PRESSURE: 70 MMHG | SYSTOLIC BLOOD PRESSURE: 117 MMHG | RESPIRATION RATE: 16 BRPM | BODY MASS INDEX: 23.63 KG/M2 | HEART RATE: 80 BPM | OXYGEN SATURATION: 96 % | WEIGHT: 146.4 LBS

## 2018-03-05 DIAGNOSIS — C18.9 MALIGNANT NEOPLASM OF COLON, UNSPECIFIED PART OF COLON (H): ICD-10-CM

## 2018-03-05 DIAGNOSIS — C50.211 MALIGNANT NEOPLASM OF UPPER-INNER QUADRANT OF RIGHT BREAST IN FEMALE, ESTROGEN RECEPTOR POSITIVE (H): Primary | ICD-10-CM

## 2018-03-05 DIAGNOSIS — Z17.0 MALIGNANT NEOPLASM OF UPPER-INNER QUADRANT OF RIGHT BREAST IN FEMALE, ESTROGEN RECEPTOR POSITIVE (H): Primary | ICD-10-CM

## 2018-03-05 PROCEDURE — 99214 OFFICE O/P EST MOD 30 MIN: CPT | Performed by: INTERNAL MEDICINE

## 2018-03-05 PROCEDURE — G0463 HOSPITAL OUTPT CLINIC VISIT: HCPCS

## 2018-03-05 ASSESSMENT — PAIN SCALES - GENERAL: PAINLEVEL: NO PAIN (0)

## 2018-03-05 NOTE — PATIENT INSTRUCTIONS
Mammogram in next few weeks.   Scheduled - Raiza  CT scan in July 2018.   Scheduled - Raiza  Hold anatrazole for 1 month and call us to let us know how the rash is doing.  Follow up in July after the CT scan with labs.   Scheduled - Raiza    AVS given to patient - Raiza

## 2018-03-05 NOTE — MR AVS SNAPSHOT
"              After Visit Summary   3/5/2018    Wanda Dockery    MRN: [id number]           Patient Information     Date Of Birth          1935        Visit Information        Provider Department      3/5/2018 10:00 AM Angie Rodas MD [facility]        Today's Diagnoses     Malignant neoplasm of upper-inner quadrant of right breast in female, estrogen receptor positive (H)    -  1    Malignant neoplasm of colon, unspecified part of colon (H)          Care Instructions    Mammogram in next few weeks.  CT scan in July 2018.  Hold anatrazole for 1 month and call us to let us know how the rash is doing.  Follow up in July after the CT scan with labs.            Follow-ups after your visit        Your next 10 appointments already scheduled     Mar 12, 2018  9:45 AM CDT   (Arrive by 9:30 AM)   MA SCREENING DIGITAL BILATERAL with [id number]   [facility] ([facility])    [address] [place] [address], [address]  [facility] [address] [phone]           Do not use any powder, lotion or deodorant under your arms or on your breast. If you do, we will ask you to remove it before your exam.  Wear comfortable, two-piece clothing.  If you have any allergies, tell your care team.  Bring any previous mammograms from other facilities or have them mailed to the breast center. Three-dimensional (3D) mammograms are available at [place] locations in [facility], [place], [place], [facility], [place], [place], and [place]. [facility] locations include [facility] in [place]. Benefits of 3D mammograms include: - Improved rate of cancer detection - Decreases your chance of having to go back for more tests, which means fewer: - \"False-positive\" results (This means that there is an abnormal area but it isn't cancer.) - Invasive testing procedures, such as a biopsy or surgery - Can provide clearer images of the breast if you have dense breast " tissue. 3D mammography is an optional exam that anyone can have with a 2D mammogram. It doesn't replace or take the place of a 2D mammogram. 2D mammograms remain an effective screening test for all women.  Not all insurance companies cover the cost of a 3D mammogram. Check with your insurance.            Jul 09, 2018 10:00 AM CDT   (Arrive by 9:45 AM)   CT CHEST ABDOMEN PELVIS W/O & W CONTRAST with SHCT1   [facility] ([facility])    [address] [place] [address] [phone]           Please bring any scans or X-rays taken at other hospitals, if similar tests were done. Also bring a list of your medicines, including vitamins, minerals and over-the-counter drugs. It is safest to leave personal items at home.  Be sure to tell your doctor:   If you have any allergies.   If there s any chance you are pregnant.   If you are breastfeeding.  You may have contrast for this exam. To prepare:   Do not eat or drink for 2 hours before your exam. If you need to take medicine, you may take it with small sips of water. (We may ask you to take liquid medicine as well.)   The day before your exam, drink extra fluids at least six 8-ounce glasses (unless your doctor tells you to restrict your fluids).   You will be given instructions on how to drink the contrast.  Patients over 70 or patients with diabetes or kidney problems:   If you haven t had a blood test (creatinine test) within the last 30 days, the Cardiologist/Radiologist may require you to get this test prior to your exam.  If you have diabetes:   Continue to take your metformin medication on the day of your exam  Please wear loose clothing, such as a sweat suit or jogging clothes. Avoid snaps, zippers and other metal. We may ask you to undress and put on a hospital gown.  If you have any questions, please call the Imaging Department where you will have your exam.            Jul 13, 2018  3:00 PM CDT   Return Visit with Rachelle  "Oncology Nurse   [facility] ([facility])    [facility] [place]  [address]  [place] [address] [phone]            Jul 16, 2018 10:00 AM CDT   Return Visit with Angie Rodas MD   [facility] ([facility])    [facility] [place]  [address]  [place] [address]   [phone]              Future tests that were ordered for you today     Open Future Orders        Priority Expected Expires Ordered    Hemoglobin Routine 7/5/2018 10/5/2018 3/5/2018    CEA Routine 7/5/2018 10/5/2018 3/5/2018    Hepatic panel (Albumin, ALT, AST, Bili, Alk Phos, TP) Routine 7/5/2018 10/5/2018 3/5/2018    CT Chest Abdomen Pelvis w/o & w Contrast Routine 7/5/2018 3/5/2019 3/5/2018    *MA Screening Digital Bilateral Routine  3/5/2019 3/5/2018            Who to contact     If you have questions or need follow up information about today's clinic visit or your schedule please contact [facility] directly at [phone].  Normal or non-critical lab and imaging results will be communicated to you by [employer], letter or phone within 4 business days after the clinic has received the results. If you do not hear from us within 7 days, please contact the clinic through [employer] or phone. If you have a critical or abnormal lab result, we will notify you by phone as soon as possible.  Submit refill requests through [employer] or call your pharmacy and they will forward the refill request to us. Please allow 3 business days for your refill to be completed.          Additional Information About Your Visit        [employer] Information     [employer] lets you send messages to your doctor, view your test results, renew your prescriptions, schedule appointments and more. To sign up, go to [email or web address] . Click on \"Log in\" on the left side of the screen, which will take you to the Welcome page. Then click on \"Sign up Now\" on the " right side of the page.     You will be asked to enter the access code listed below, as well as some personal information. Please follow the directions to create your username and password.     Your access code is: [email or web address]  Expires: 2018  3:07 PM     Your access code will  in 90 days. If you need help or a new code, please call your [facility] or [phone].        Care EveryWhere ID     This is your Care EveryWhere ID. This could be used by other organizations to access your [place] medical records  [id number]        Your Vitals Were     Pulse Temperature Respirations Pulse Oximetry BMI (Body Mass Index)       80 98.3  F (36.8  C) (Oral) 16 96% 23.63 kg/m2        Blood Pressure from Last 3 Encounters:   18 117/70   17 112/64   17 125/74    Weight from Last 3 Encounters:   18 66.4 kg (146 lb 6.4 oz)   17 64.7 kg (142 lb 9.6 oz)   17 64.4 kg (142 lb)               Primary Care Provider Office Phone # Fax #    Ginette Lemus PA-C [phone] [address] [address]        Equal Access to Services     [facility]: Hadii aad ku hadasho Soleroyali, waaxda luqadaha, qaybta kaalmada adeegyada, marie morton hayjeison soto . So [facility] [phone].    ATENCIÓN: Si habla español, tiene a hernandez disposición servicios gratuitos de asistencia lingüística. Llame al [phone].    We comply with applicable federal civil rights laws and [place] laws. We do not discriminate on the basis of race, color, national origin, age, disability, sex, sexual orientation, or gender identity.            Thank you!     Thank you for choosing [facility]  for your care. Our goal is always to provide you with excellent care. Hearing back from our patients is one way we can continue to improve our services. Please take a few minutes to complete the written survey that you may receive in the mail after your visit with us. Thank  you!             Your Updated Medication List - Protect others around you: Learn how to safely use, store and throw away your medicines at www.disposemymeds.org.          This list is accurate as of 3/5/18 11:37 AM.  Always use your most recent med list.                   Brand Name Dispense Instructions for use Diagnosis    anastrozole 1 MG tablet    ARIMIDEX    90 tablet    Take 1 tablet (1 mg) by mouth daily    Malignant neoplasm of upper-inner quadrant of right breast in female, estrogen receptor positive (H)       CALCIUM + D3 PO      Take 1 tablet by mouth daily Dose 500 - 500 mg        clobetasol 0.05 % cream    TEMOVATE    15 g    Apply sparingly to affected area twice daily for 14 days.  Do not apply to face.    Insect bite, initial encounter       MULTIVITAMIN & MINERAL PO      Take 1 tablet by mouth daily.

## 2018-03-05 NOTE — LETTER
"    3/5/2018         RE: Wanda Dockery  [address] [place] [address]        Dear Colleague,    Thank you for referring your patient, Wnada Dockery, to the [facility]. Please see a copy of my visit note below.    Oncology Rooming Note    March 5, 2018 10:29 AM   Wanda Dockery is a 82 year old female who presents for:    Chief Complaint   Patient presents with     Oncology Clinic Visit     Initial Vitals: /70 (BP Location: Left arm, Patient Position: Chair, Cuff Size: Adult Regular)  Pulse 80  Temp 98.3  F (36.8  C) (Oral)  Resp 16  Wt 66.4 kg (146 lb 6.4 oz)  SpO2 96%  BMI 23.63 kg/m2 Estimated body mass index is 23.63 kg/(m^2) as calculated from the following:    Height as of 8/2/17: 1.676 m (5' 6\").    Weight as of this encounter: 66.4 kg (146 lb 6.4 oz). Body surface area is 1.76 meters squared.  No Pain (0) Comment: Data Unavailable   No LMP recorded. Patient is postmenopausal.  Allergies reviewed: Yes  Medications reviewed: Yes    Medications: Medication refills not needed today.  Pharmacy name entered into [facility]: [facility]  [place], [place] - [address]    Clinical concerns: None     5 minutes for nursing intake (face to face time)     Radha Gonzales CMA                Visit Date:   03/05/2018      SUBJECTIVE:  Mrs. Dockery is an 82-year-old female with a history of breast cancer and colon cancer as described above.  The patient had a stage IIb right breast cancer, ER positive and HER-2/mario positive.  The patient had right breast lumpectomy surgery with right lymph node dissection in 07/2015.  She did receive adjuvant treatment with Taxol, Herceptin and radiation.  She is currently on anastrozole since 11/2015.  She is doing well.  There has been no evidence of recurrence.      The patient had a right hemicolectomy in 07/2015.  She has stage II colon cancer.  She is doing well.  There is no evidence of recurrence.      The patient is ***.  Denies any " headache, no dizziness, no neck pain, no chest pain, or difficulty breathing.  No abdominal pain, nausea or vomiting.  No bowel complaints.  No bleeding.  No fevers, chills or night sweats.  There is no fatigue.        The patient has been getting some rash on her face.  She has been evaluated for it and currently is on Temovate.  This rash has been going on for about 3 months.  The patient is wondering if anastrozole can cause it.       The patient has osteopenia and received vitamin B12 *** does not want any bisphosphonate or Prolia.      PHYSICAL EXAMINATION:   GENERAL:  Unchanged.     She does have a few erythematous micropapillary rash on the face.      BREASTS:  Exam ***.        ASSESSMENT:   1.  An 82-year-old female with a history of stage IIb right breast cancer.  ER positive and HER-2/mario positive.  No evidence of recurrence.   2.  Stage II colon cancer.  No evidence of recurrence.   3.  Rash on her face which has improved with Temovate.     4.  Osteopenia.   5.  Thyroid nodule.      PLAN:   1.  I discussed with her regarding breast cancer.  The patient is doing well with no evidence of recurrence.   a.  We will schedule her for a mammogram.   b.  The patient is on anastrozole.  She has been tolerating it well.    c.  The patient's rash on the face.  She asked if anastrozole could have caused it.  I told the patient to hold the anastrozole for the month and see what happens.  She will call and let us know if the rash disappears or not.     d.  If the rash resolves and reappears when she restarts anastrozole, then we will know it is secondary to anastrozole.  If it does not resolve, then we know it is nothing to do with the anastrozole.   2.  For colon cancer, we will schedule a CT scan in July.  Her next colonoscopy is due next year.   3.  The patient has some fatigue.   It could likely be related to *** .  The patient is still fairly active.   4.  The patient asked questions, which were all answered.  I  will see her back in July.  I advised her to ***, unexplained pain, change in bowel habits, worsening weakness or any other concerns.         WILFREDO COLON MD             D: 2018   T: 2018   MT: MD      Name:     ALTAF DEL ROSARIO   MRN:      [id number]        Account:      [id number]   :      1935           Visit Date:   2018      Document: [id number]       Again, thank you for allowing me to participate in the care of your patient.        Sincerely,        Wilfredo Colon MD

## 2018-03-05 NOTE — PROGRESS NOTES
"Oncology Rooming Note    March 5, 2018 10:29 AM   Wanda Dockery is a 82 year old female who presents for:    Chief Complaint   Patient presents with     Oncology Clinic Visit     Initial Vitals: /70 (BP Location: Left arm, Patient Position: Chair, Cuff Size: Adult Regular)  Pulse 80  Temp 98.3  F (36.8  C) (Oral)  Resp 16  Wt 66.4 kg (146 lb 6.4 oz)  SpO2 96%  BMI 23.63 kg/m2 Estimated body mass index is 23.63 kg/(m^2) as calculated from the following:    Height as of 8/2/17: 1.676 m (5' 6\").    Weight as of this encounter: 66.4 kg (146 lb 6.4 oz). Body surface area is 1.76 meters squared.  No Pain (0) Comment: Data Unavailable   No LMP recorded. Patient is postmenopausal.  Allergies reviewed: Yes  Medications reviewed: Yes    Medications: Medication refills not needed today.  Pharmacy name entered into [facility]: [facility] [place], [place] - [address]    Clinical concerns: None     5 minutes for nursing intake (face to face time)     Radha Gonzales CMA              "

## 2018-03-08 NOTE — PROGRESS NOTES
Visit Date:   03/05/2018     ONCOLOGIC HISTORY:  Wanda Dockery is a female with stage IIB (pT2 pN1a) right breast cancer. ER positive and HER-2/mario positive and stage II (pT2 pN0 M0) colon cancer.  1. Bilateral diagnostic mammograms and right breast ultrasound on 06/23/2015 for painless lump revealed a 3 cm lesion at 1 o'clock position and right axillary lymphadenopathy.   2. Ultrasound-guided biopsy of right breast and axillary lymph node on 06/29/2015 revealed grade 3 invasive ductal carcinoma, both in the breast and axillary lymph node. ER positive and HER-2/mario equivocal by FISH. IHC is 3+ in the right breast. In the right lymph node IHC is 2+.  3. Bone scan on 07/07/2015 did not reveal any bone metastasis.   4. CT of the chest, abdomen and pelvis on 07/07/2015 revealed right breast lesion and 1.2 x 2 cm right axillary lymph node. There is thickening of the wall of the hepatitic flexure of the colon with associated inflammatory changes and multiple mildly prominent lymph nodes in the mesentery.   6. Colonoscopy on 07/13/2015 revealed fungating, ulcerated, partially obstructing mass at the hepatic flexure and a sessile polyp in the cecum.  Hepatic flexure mass revealed poorly differentiated adenocarcinoma. 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 There is absence of expression of DNA mismatch repair protein MLH1 and PMS2.   7. Right breast lumpectomy, right lymph node dissection and right colectomy was done on 07/16/2015.    -Hemicolectomy specimen revealed high-grade adenocarcinoma measuring 7.5 cm extending into the santi-intestinal fatty tissue. Margins were negative. No lymphovascular invasion. Twenty-one benign lymph nodes. She has stage II (pT2 pN0 M0) colon cancer.   -Right breast reveals invasive ductal carcinoma, grade 2. It measures 2.1 cm. There was no DCIS. There was lymphovascular invasion present. Margins positive for invasive carcinoma. One of 7 lymph nodes positive. No extranodal extension. The patient  has stage IIB (pT2 pN1a) breast cancer.   8. Reexcision of the lumpectomy site on 07/29/2015. There was no residual malignancy.   9. Weekly Taxol and Herceptin x 12 was given between 08/17/2015 and 11/02/2015. Herceptin q3 weeks started on 11/09/2015. One year of herceptin completed on 08/08/2016.  10. Anastrazole started 11/30/2015.  11. DEXA scan on 04/25/2016 reveals osteopenia.  12. The patient received radiation for breast cancer.  5040 cGy between 04/18/2016 and 06/15/2016.    13. CT of the chest, abdomen and pelvis on 07/19/2016 does not reveal any evidence of malignancy.  There is a tiny stable nodule in the left lower lobe.    14. Colonoscopy on 08/01/2016 revealed tiny polyp.  Pathology revealed tubular adenoma.   15. Patient does not want bisphosphonate or prolia.  16.  CT chest, abdomen and pelvis on 07/11/2017 does not reveal any evidence of malignancy.  There is a stable 5 mm left lower lobe nodule.  There is heterogeneous 1.9 cm mass in the left lobe of thyroid.  17. Thyroid ultrasound on 08/07/2017 revealed a 3.3 cm hypoechoic nodule in the left thyroid lobe.    -Ultrasound guided FNA was done on 08/07/2017.  It is benign.   18. DEXA scan on 08/07/2017 reveals stable osteopenia.      SUBJECTIVE:    Mrs. Dockery is an 82-year-old female with a history of breast cancer and colon cancer as described above.  Patient had  stage IIB right breast cancer. ER positive and HER-2/mario positive.  Patient had right breast lumpectomy with right lymph node dissection in 07/2015.  She received adjuvant Taxol and Herceptin and radiation.  She is currently on anastrozole since 11/2015.  She is doing well.  There has been no evidence of recurrence.      Patient had a right hemicolectomy in 07/2015.  She had stage II colon cancer.  She is doing well.  There is no evidence of recurrence.      Patient is doing well.  Denies any headache, dizziness, bone pain, chest pain or difficulty breathing.  No abdominal pain, nausea or  vomiting.  No bowel complaints.  No bleeding.  No fevers, chills or night sweats.  There is no excessive fatigue.        Patient has been getting some rash on her face.  She has been evaluated for it and currently is on Temovate.  This rash has been going on for about 3 months.  Patient is wondering if anastrozole can be causing it.       Patient has osteopenia. She does not want any bisphosphonate or Prolia.    PHYSICAL EXAMINATION:   Alert and oriented x 3.   VITAL SIGNS: Reviewed.   EYES: No icterus.   THROAT: No ulcer or thrush.   NECK: Supple. No lymphadenopathy or thyromegaly.   AXILLAE: No lymphadenopathy.   LUNGS: Good air entry bilaterally. No crackles or wheezing.   HEART: Regular. No murmur.   ABDOMEN: Soft. Nontender. No mass.   EXTREMITIES: No edema. No calf swelling or tenderness.   SKIN: She does have a few erythematous maculo-papular rash on the face.      BREASTS:  Both the breasts examined in the presence of the nurse.    -Right breast has post-surgical changes.  No suspicious mass.  No suspicious skin lesion.  No nipple discharge.   -Left breast is normal.  No mass, skin changes or nipple discharge.       ASSESSMENT:   1.  An 82-year-old female with history of stage IIB right breast cancer.  ER positive and HER-2/mario positive.  No evidence of recurrence.   2.  Stage II colon cancer.  No evidence of recurrence.   3.  Rash on her face which has improved with Temovate.     4.  Osteopenia.   5.  Thyroid nodule.      PLAN:   1.  I discussed with her regarding breast cancer. Patient is doing well with no evidence of recurrence. Patient is on anastrozole.  She has been tolerating it well. She will continue on it.    We will schedule a mammogram.     2. Patient has rash on the face.  She asked if anastrozole could have caused it.  I told the patient to hold the anastrozole for the month and see what happens.  She will call and let us know if the rash disappears or not.       If the rash resolves and  reappears when she restarts anastrozole, then we will know it is secondary to anastrozole.  If it does not resolve, then we know it is nothing to do with the anastrozole.     3.  For colon cancer, we will schedule CT scan in July.  Her next colonoscopy is due next year.     4. Patient has some fatigue, age appropriate. Patient is still fairly active.     5. Patient had few questions, which were all answered.  I will see her back in July.  I advised her to see a physician if she has unexplained pain, change in bowel habits, worsening weakness or any other concerns.         WILFREDO COLON MD             D: 2018   T: 2018   MT: MD      Name:     ALTAF DEL ROSARIO   MRN:      [id number]        Account:      [id number]   :      1935           Visit Date:   2018      Document: [id number]

## 2018-03-12 ENCOUNTER — HOSPITAL ENCOUNTER (OUTPATIENT)
Dept: MAMMOGRAPHY | Facility: CLINIC | Age: 83
Discharge: HOME | End: 2018-03-12
Attending: INTERNAL MEDICINE | Admitting: INTERNAL MEDICINE
Payer: COMMERCIAL

## 2018-03-12 DIAGNOSIS — C18.9 MALIGNANT NEOPLASM OF COLON, UNSPECIFIED PART OF COLON (H): ICD-10-CM

## 2018-03-12 DIAGNOSIS — Z17.0 MALIGNANT NEOPLASM OF UPPER-INNER QUADRANT OF RIGHT BREAST IN FEMALE, ESTROGEN RECEPTOR POSITIVE (H): ICD-10-CM

## 2018-03-12 DIAGNOSIS — C50.211 MALIGNANT NEOPLASM OF UPPER-INNER QUADRANT OF RIGHT BREAST IN FEMALE, ESTROGEN RECEPTOR POSITIVE (H): ICD-10-CM

## 2018-03-12 PROCEDURE — 77067 SCR MAMMO BI INCL CAD: CPT

## 2018-03-27 ENCOUNTER — TRANSFERRED RECORDS (OUTPATIENT)
Dept: HEALTH INFORMATION MANAGEMENT | Facility: CLINIC | Age: 83
End: 2018-03-27

## 2018-06-26 DIAGNOSIS — C50.211 MALIGNANT NEOPLASM OF UPPER-INNER QUADRANT OF RIGHT BREAST IN FEMALE, ESTROGEN RECEPTOR POSITIVE (H): ICD-10-CM

## 2018-06-26 DIAGNOSIS — Z17.0 MALIGNANT NEOPLASM OF UPPER-INNER QUADRANT OF RIGHT BREAST IN FEMALE, ESTROGEN RECEPTOR POSITIVE (H): ICD-10-CM

## 2018-07-09 ENCOUNTER — HOSPITAL ENCOUNTER (OUTPATIENT)
Dept: CT IMAGING | Facility: CLINIC | Age: 83
Discharge: HOME | End: 2018-07-09
Attending: INTERNAL MEDICINE | Admitting: INTERNAL MEDICINE
Payer: COMMERCIAL

## 2018-07-09 DIAGNOSIS — C18.9 MALIGNANT NEOPLASM OF COLON, UNSPECIFIED PART OF COLON (H): ICD-10-CM

## 2018-07-09 DIAGNOSIS — C50.211 MALIGNANT NEOPLASM OF UPPER-INNER QUADRANT OF RIGHT BREAST IN FEMALE, ESTROGEN RECEPTOR POSITIVE (H): ICD-10-CM

## 2018-07-09 DIAGNOSIS — Z17.0 MALIGNANT NEOPLASM OF UPPER-INNER QUADRANT OF RIGHT BREAST IN FEMALE, ESTROGEN RECEPTOR POSITIVE (H): ICD-10-CM

## 2018-07-09 LAB
CREAT BLD-MCNC: 0.8 MG/DL (ref 0.52–1.04)
GFR SERPL CREATININE-BSD FRML MDRD: 69 ML/MIN/1.7M2

## 2018-07-09 PROCEDURE — 71260 CT THORAX DX C+: CPT

## 2018-07-09 PROCEDURE — 25000128 H RX IP 250 OP 636: Performed by: INTERNAL MEDICINE

## 2018-07-09 PROCEDURE — 25000125 ZZHC RX 250: Performed by: INTERNAL MEDICINE

## 2018-07-09 PROCEDURE — 82565 ASSAY OF CREATININE: CPT

## 2018-07-09 RX ORDER — IOPAMIDOL 755 MG/ML
71 INJECTION, SOLUTION INTRAVASCULAR ONCE
Status: COMPLETED | OUTPATIENT
Start: 2018-07-09 | End: 2018-07-09

## 2018-07-09 RX ADMIN — IOPAMIDOL 71 ML: 755 INJECTION, SOLUTION INTRAVENOUS at 11:06

## 2018-07-09 RX ADMIN — SODIUM CHLORIDE, PRESERVATIVE FREE 60 ML: 5 INJECTION INTRAVENOUS at 11:07

## 2018-07-13 ENCOUNTER — ONCOLOGY VISIT (OUTPATIENT)
Dept: ONCOLOGY | Facility: CLINIC | Age: 83
End: 2018-07-13
Attending: INTERNAL MEDICINE
Payer: COMMERCIAL

## 2018-07-13 ENCOUNTER — HOSPITAL ENCOUNTER (OUTPATIENT)
Facility: CLINIC | Age: 83
Setting detail: SPECIMEN
Discharge: HOME | End: 2018-07-13
Attending: INTERNAL MEDICINE | Admitting: INTERNAL MEDICINE
Payer: COMMERCIAL

## 2018-07-13 DIAGNOSIS — Z17.0 MALIGNANT NEOPLASM OF UPPER-INNER QUADRANT OF RIGHT BREAST IN FEMALE, ESTROGEN RECEPTOR POSITIVE (H): ICD-10-CM

## 2018-07-13 DIAGNOSIS — C50.211 MALIGNANT NEOPLASM OF UPPER-INNER QUADRANT OF RIGHT BREAST IN FEMALE, ESTROGEN RECEPTOR POSITIVE (H): ICD-10-CM

## 2018-07-13 DIAGNOSIS — C18.9 MALIGNANT NEOPLASM OF COLON, UNSPECIFIED PART OF COLON (H): ICD-10-CM

## 2018-07-13 LAB
ALBUMIN SERPL-MCNC: 3.8 G/DL (ref 3.4–5)
ALP SERPL-CCNC: 69 U/L (ref 40–150)
ALT SERPL W P-5'-P-CCNC: 28 U/L (ref 0–50)
AST SERPL W P-5'-P-CCNC: 21 U/L (ref 0–45)
BILIRUB DIRECT SERPL-MCNC: <0.1 MG/DL (ref 0–0.2)
BILIRUB SERPL-MCNC: 0.4 MG/DL (ref 0.2–1.3)
CEA SERPL-MCNC: 2.7 UG/L (ref 0–2.5)
HGB BLD-MCNC: 13.3 G/DL (ref 11.7–15.7)
PROT SERPL-MCNC: 7.7 G/DL (ref 6.8–8.8)

## 2018-07-13 PROCEDURE — 85018 HEMOGLOBIN: CPT | Performed by: INTERNAL MEDICINE

## 2018-07-13 PROCEDURE — 36415 COLL VENOUS BLD VENIPUNCTURE: CPT

## 2018-07-13 PROCEDURE — 80076 HEPATIC FUNCTION PANEL: CPT | Performed by: INTERNAL MEDICINE

## 2018-07-13 PROCEDURE — 82378 CARCINOEMBRYONIC ANTIGEN: CPT | Performed by: INTERNAL MEDICINE

## 2018-07-13 NOTE — MR AVS SNAPSHOT
After Visit Summary   7/13/2018    Wanda Dockery    MRN: [id number]           Patient Information     Date Of Birth          1935        Visit Information        Provider Department      7/13/2018 3:00 PM Nurse, Rachelle Oncology [facility]        Today's Diagnoses     Malignant neoplasm of upper-inner quadrant of right breast in female, estrogen receptor positive (H)        Malignant neoplasm of colon, unspecified part of colon (H)           Follow-ups after your visit        Your next 10 appointments already scheduled     Jul 16, 2018 10:00 AM CDT   Return Visit with Angie Rodas MD   [facility] ([facility])    [facility] [place]  [address]  [facility] [address] [phone]              Who to contact     If you have questions or need follow up information about today's clinic visit or your schedule please contact [facility] directly at [phone].  Normal or non-critical lab and imaging results will be communicated to you by MyChart, letter or phone within 4 business days after the clinic has received the results. If you do not hear from us within 7 days, please contact the clinic through MyChart or phone. If you have a critical or abnormal lab result, we will notify you by phone as soon as possible.  Submit refill requests through [employer] or call your pharmacy and they will forward the refill request to us. Please allow 3 business days for your refill to be completed.          Additional Information About Your Visit        Care EveryWhere ID     This is your Care EveryWhere ID. This could be used by other organizations to access your [place] medical records  [id number]         Blood Pressure from Last 3 Encounters:   03/05/18 117/70   08/14/17 112/64   08/07/17 125/74    Weight from Last 3 Encounters:   03/05/18 66.4 kg (146 lb 6.4 oz)   08/14/17 64.7 kg (142 lb 9.6 oz)   08/02/17 64.4 kg (142 lb)               We Performed the Following     CEA     Hemoglobin     Hepatic panel (Albumin, ALT, AST, Bili, Alk Phos, TP)        Primary Care Provider Office Phone # Fax #    Ginette Lemus PA-C [phone] [address] [place] [address]        Equal Access to Services     SHARRI BAILEY : Hadii aad ku hadasho Soomaali, waaxda luqadaha, qaybta kaalmada adeegyada, waxay idiin hayaan adegt khjhonsh lajames . So [facility] [phone].    ATENCIÓN: Si habla español, tiene a hernandez disposición servicios gratuitos de asistencia lingüística. Shannoname al [phone].    We comply with applicable federal civil rights laws and [place] laws. We do not discriminate on the basis of race, color, national origin, age, disability, sex, sexual orientation, or gender identity.            Thank you!     Thank you for choosing [facility]  for your care. Our goal is always to provide you with excellent care. Hearing back from our patients is one way we can continue to improve our services. Please take a few minutes to complete the written survey that you may receive in the mail after your visit with us. Thank you!             Your Updated Medication List - Protect others around you: Learn how to safely use, store and throw away your medicines at www.disposemymeds.org.          This list is accurate as of 7/13/18  3:12 PM.  Always use your most recent med list.                   Brand Name Dispense Instructions for use Diagnosis    anastrozole 1 MG tablet    ARIMIDEX    90 tablet    Take 1 tablet (1 mg) by mouth daily    Malignant neoplasm of upper-inner quadrant of right breast in female, estrogen receptor positive (H)       CALCIUM + D3 PO      Take 1 tablet by mouth daily Dose 500 - 500 mg        clobetasol 0.05 % cream    TEMOVATE    15 g    Apply sparingly to affected area twice daily for 14 days.  Do not apply to face.    Insect bite, initial encounter       MULTIVITAMIN & MINERAL PO      Take 1 tablet by  mouth daily.

## 2018-07-13 NOTE — PROGRESS NOTES
Medical Assistant Note:  Wnada Dockery presents today for labs.    Patient seen by provider today: No.   present during visit today: Not Applicable.    Concerns: No Concerns.    Procedure:  Lab draw site: LAC, Needle type: BF, Gauge: 21.    Post Assessment:  Labs drawn without difficulty: Yes.    Discharge Plan:  PT tolerated procedure well. Gauze and band aid applied as requested. Technique was complimented.     Face to Face Time: 8min.    Jessica Rhoades MA

## 2018-07-13 NOTE — LETTER
7/13/2018         RE: Wanda Dockery  [address] [place] [address]        Dear Colleague,    Thank you for referring your patient, Wanda Dockery, to the [facility]CER [facility]. Please see a copy of my visit note below.    Medical Assistant Note:  Wanda Dockery presents today for labs.    Patient seen by provider today: No.   present during visit today: Not Applicable.    Concerns: No Concerns.    Procedure:  Lab draw site: LAC, Needle type: BF, Gauge: 21.    Post Assessment:  Labs drawn without difficulty: Yes.    Discharge Plan:  PT tolerated procedure well. Gauze and band aid applied as requested. Technique was complimented.     Face to Face Time: 8min.    Jessica Rhoades MA            Again, thank you for allowing me to participate in the care of your patient.        Sincerely,        Oncology Nurse

## 2018-07-16 ENCOUNTER — HOSPITAL ENCOUNTER (OUTPATIENT)
Facility: CLINIC | Age: 83
Setting detail: SPECIMEN
End: 2018-07-16
Attending: INTERNAL MEDICINE
Payer: COMMERCIAL

## 2018-07-16 ENCOUNTER — ONCOLOGY VISIT (OUTPATIENT)
Dept: ONCOLOGY | Facility: CLINIC | Age: 83
End: 2018-07-16
Attending: INTERNAL MEDICINE
Payer: COMMERCIAL

## 2018-07-16 VITALS
OXYGEN SATURATION: 96 % | RESPIRATION RATE: 12 BRPM | DIASTOLIC BLOOD PRESSURE: 70 MMHG | WEIGHT: 141.4 LBS | SYSTOLIC BLOOD PRESSURE: 115 MMHG | TEMPERATURE: 98.3 F | HEART RATE: 85 BPM | BODY MASS INDEX: 22.82 KG/M2

## 2018-07-16 DIAGNOSIS — C50.211 MALIGNANT NEOPLASM OF UPPER-INNER QUADRANT OF RIGHT BREAST IN FEMALE, ESTROGEN RECEPTOR POSITIVE (H): ICD-10-CM

## 2018-07-16 DIAGNOSIS — C18.9 MALIGNANT NEOPLASM OF COLON, UNSPECIFIED PART OF COLON (H): Primary | ICD-10-CM

## 2018-07-16 DIAGNOSIS — Z17.0 MALIGNANT NEOPLASM OF UPPER-INNER QUADRANT OF RIGHT BREAST IN FEMALE, ESTROGEN RECEPTOR POSITIVE (H): ICD-10-CM

## 2018-07-16 PROCEDURE — 99214 OFFICE O/P EST MOD 30 MIN: CPT | Performed by: INTERNAL MEDICINE

## 2018-07-16 PROCEDURE — G0463 HOSPITAL OUTPT CLINIC VISIT: HCPCS

## 2018-07-16 ASSESSMENT — PAIN SCALES - GENERAL: PAINLEVEL: NO PAIN (0)

## 2018-07-16 NOTE — PROGRESS NOTES
Visit Date:   07/16/2018     ONCOLOGIC HISTORY:  Wanda Dockery is a female with:  -Stage IIB (pT2 pN1a) right breast cancer. ER positive and HER-2/mario positive  -Stage II (pT2 pN0 M0) colon cancer.  1. Bilateral diagnostic mammograms and right breast ultrasound on 06/23/2015 revealed a 3 cm lesion at 1 o'clock position and right axillary lymphadenopathy.   -Ultrasound-guided biopsy of right breast and axillary lymph node on 06/29/2015 revealed grade 3 invasive ductal carcinoma, both in the breast and axillary lymph node. ER positive and HER-2/mario equivocal by FISH. IHC is 3+ in the right breast. In the right lymph node IHC is 2+.    2. Bone scan on 07/07/2015 did not reveal any bone metastasis.   -CT of the chest, abdomen and pelvis on 07/07/2015 revealed right breast lesion and 1.2 x 2 cm right axillary lymph node. There is thickening of the wall of the hepatitic flexure of the colon with associated inflammatory changes and multiple mildly prominent lymph nodes in the mesentery.   -Colonoscopy on 07/13/2015 revealed partially obstructing mass at the hepatic flexure and a sessile polyp in the cecum.  Hepatic flexure mass revealed poorly differentiated adenocarcinoma. 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 There is absence of expression of DNA mismatch repair protein MLH1 and PMS2.     3. Right breast lumpectomy, right lymph node dissection and right colectomy was done on 07/16/2015.    -Hemicolectomy specimen revealed high-grade adenocarcinoma measuring 7.5 cm extending into the santi-intestinal fatty tissue. Margins were negative. No lymphovascular invasion. Twenty-one benign lymph nodes. She has stage II (pT2 pN0 M0) colon cancer.   -Right breast reveals invasive ductal carcinoma, grade 2. It measures 2.1 cm. There was no DCIS. There was lymphovascular invasion present. Margins positive for invasive carcinoma. One of 7 lymph nodes positive. No extranodal extension. The patient has stage IIB (pT2 pN1a) breast cancer.    -Reexcision of lumpectomy site on 07/29/2015. There was no residual malignancy.     4. Taxol and Herceptin weekly x 12 between 08/17/2015 and 11/02/2015. One year of herceptin completed on 08/08/2016.  -Anastrazole started 11/30/2015.  -Radiation to breast cancer.  5040 cGy between 04/18/2016 and 06/15/2016.      5. DEXA scan on 04/25/2016 reveals osteopenia.  -Patient does not want bisphosphonate or prolia.  -DEXA scan on 08/07/2017 reveals stable osteopenia.      SUBJECTIVE:    Ms. Dockery is an 83-year-old female with breast cancer and colon cancer. Patient had stage IIB right breast cancer in 2015.  ER positive and HER-2/mario positive.  She had surgery, chemotherapy, Herceptin and radiation.  She is currently on anastrozole.  She is tolerating it well.      She also had stage II colon cancer.  She had surgery in 07/2015.  No evidence of recurrence.      She is doing well.      REVIEW OF SYSTEMS:  No headache.  No dizziness.  No chest pain or difficulty breathing.  No abdominal pain, nausea or vomiting.  Appetite overall has been good.  No urinary or bowel complaints.  No bleeding.      PHYSICAL EXAMINATION:   GENERAL:  She is alert, oriented x 3.   VITAL SIGNS:  Reviewed.   Rest of the system not examined.     LABORATORY DATA:  Reviewed.  Hemoglobin and LFTs normal.  CEA slightly high at 2.7.      CT chest, abdomen and pelvis does not reveal any evidence of malignancy.  There is a thyroid nodule which is stable.  There is a lung nodule which is stable.      ASSESSMENT:   1.  An 83-year-old female with stage IIB right breast cancer diagnosed in 2015.  ER positive, HER-2/mario positive.  No evidence of recurrence.   2.  Colon cancer status post hemicolectomy in 07/2015.  Stage II.  No evidence of recurrence.   3.  Osteopenia.   4.  Thyroid nodule.      PLAN:   1. Patient is doing well from breast cancer.  No evidence of recurrence.  She is on anastrozole.  She will continue on it.  She is tolerating it well.   2.   Discussed regarding colon cancer.  She is doing well.  CT does not reveal any evidence of recurrence.  Her CEA is mildly elevated.  We will monitor it.   3.  She has osteopenia.  She will continue on calcium and vitamin D.  She does not want any other treatment for it until there is worsening.  We will continue to monitor osteopenia.   4.  She had a few questions, which were all answered.  I will see her back in 4 months' time with labs.  Advised her to see a physician sooner if she has any lump, pain, worsening weakness, shortness of breath, recurrent vomiting, bleeding or any other concerns.         WILFREDO COLON MD             D: 2018   T: 2018   MT: MAYRA      Name:     ALTAF DEL ROSARIO   MRN:      [id number]        Account:      [id number]   :      1935           Visit Date:   2018      Document: [id number]

## 2018-07-16 NOTE — PROGRESS NOTES
"Oncology Rooming Note    July 16, 2018 10:09 AM   Wanda Dockery is a 83 year old female who presents for:    Chief Complaint   Patient presents with     Oncology Clinic Visit     Initial Vitals: /70 (BP Location: Left arm, Patient Position: Chair, Cuff Size: Adult Regular)  Pulse 85  Temp 98.3  F (36.8  C) (Oral)  Resp 12  Wt 64.1 kg (141 lb 6.4 oz)  SpO2 96%  BMI 22.82 kg/m2 Estimated body mass index is 22.82 kg/(m^2) as calculated from the following:    Height as of 8/2/17: 1.676 m (5' 6\").    Weight as of this encounter: 64.1 kg (141 lb 6.4 oz). Body surface area is 1.73 meters squared.  No Pain (0) Comment: Data Unavailable   No LMP recorded. Patient is postmenopausal.  Allergies reviewed: Yes  Medications reviewed: Yes    Medications: Medication refills not needed today.  Pharmacy name entered into [facility]: [facility] [place], [place] - [address]    Clinical concerns: None     5 minutes for nursing intake (face to face time)     Radha Gonzales CMA              "

## 2018-07-16 NOTE — Clinical Note
"    7/16/2018         RE: Wanda Dockery  [address] [place] [address]        Dear Colleague,    Thank you for referring your patient, Wanda Dockery, to the [facility]. Please see a copy of my visit note below.    Oncology Rooming Note    July 16, 2018 10:09 AM   Wanda Dockery is a 83 year old female who presents for:    Chief Complaint   Patient presents with     Oncology Clinic Visit     Initial Vitals: /70 (BP Location: Left arm, Patient Position: Chair, Cuff Size: Adult Regular)  Pulse 85  Temp 98.3  F (36.8  C) (Oral)  Resp 12  Wt 64.1 kg (141 lb 6.4 oz)  SpO2 96%  BMI 22.82 kg/m2 Estimated body mass index is 22.82 kg/(m^2) as calculated from the following:    Height as of 8/2/17: 1.676 m (5' 6\").    Weight as of this encounter: 64.1 kg (141 lb 6.4 oz). Body surface area is 1.73 meters squared.  No Pain (0) Comment: Data Unavailable   No LMP recorded. Patient is postmenopausal.  Allergies reviewed: Yes  Medications reviewed: Yes    Medications: Medication refills not needed today.  Pharmacy name entered into [facility]: [facility] [place]  [place], [place] - [address]    Clinical concerns: None     5 minutes for nursing intake (face to face time)     Radha Gonzales CMA                Visit Date:   07/16/2018      SUBJECTIVE:  Ms. Dockery is an 83-year-old female with breast cancer and colon cancer.  The patient had stage IIB right breast cancer in 2015.  ER positive and HER-2/mario positive.  She had surgery, chemotherapy, Herceptin and radiation.  She is currently on anastrozole.  She is tolerating it well.      She also had stage II colon cancer.  She had surgery in 07/2015.  No evidence of recurrence.      She is doing well.      REVIEW OF SYSTEMS:  No headache.  No dizziness.  No chest pain or difficulty breathing.  No abdominal pain, nausea or vomiting.  Appetite overall has been good.  No urinary or bowel complaints.  No bleeding.      PHYSICAL EXAMINATION: "   GENERAL:  She is alert, oriented x 3.   VITAL SIGNS:  All reviewed.      LABORATORY DATA:  Reviewed.  Hemoglobin and LFTs normal.  CEA slightly high at 2.7.      CT chest, abdomen and pelvis does not reveal any evidence of malignancy.  There is a thyroid nodule which is stable.  There is a lung nodule which is stable.      ASSESSMENT:   1.  An 83-year-old female with stage IIB right breast cancer diagnosed in .  ER positive, HER-2/mario positive.  No evidence of recurrence.   2.  Colon cancer, status post hemicolectomy in 2015.  Stage II.  No evidence of recurrence.   3.  Osteopenia.   4.  Thyroid nodule.      PLAN:   1.  The patient is doing well from breast cancer.  No evidence of recurrence.  She is on anastrozole.  She will continue on it.  She is tolerating it well.   2.  Discussed regarding colon cancer.  She is doing well.  CT does not reveal any evidence of recurrence.  Her CEA is mildly elevated.  We will monitor it.   3.  She has osteopenia.  She will continue on calcium and vitamin D.  She does not want any other treatment for it until there is worsening.  We will continue to monitor osteopenia.   4.  She had a few questions, which were all answered.  I will see her back in 4 months' time with labs.  Advised her to see a physician sooner if she has any lump, pain, worsening weakness, shortness of breath, recurrent vomiting, bleeding or any other concerns.         WILFREDO COLON MD             D: 2018   T: 2018   MT: DT      Name:     ALTAF DEL ROSARIO   MRN:      [id number]        Account:      [id number]   :      1935           Visit Date:   2018      Document: [id number]       Again, thank you for allowing me to participate in the care of your patient.        Sincerely,        Wilfredo Colon MD

## 2018-07-16 NOTE — MR AVS SNAPSHOT
After Visit Summary   7/16/2018    Wanda Dockery    MRN: [id number]           Patient Information     Date Of Birth          1935        Visit Information        Provider Department      7/16/2018 10:00 AM Angie Rodas MD [facility]        Today's Diagnoses     Malignant neoplasm of colon, unspecified part of colon (H)    -  1    Malignant neoplasm of upper-inner quadrant of right breast in female, estrogen receptor positive (H)          Care Instructions    Follow up in 4 months with labs.          Follow-ups after your visit        Your next 10 appointments already scheduled     Nov 23, 2018  3:00 PM CST   Return Visit with  Oncology Nurse   [facility] ([facility])    [facility]  [address]  [place] [address] [phone]            Nov 26, 2018 10:20 AM CST   Return Visit with Angie Rodas MD   [facility] ([facility])    [facility]  [address]  [place] [address]   [phone]              Future tests that were ordered for you today     Open Future Orders        Priority Expected Expires Ordered    CEA Routine 11/26/2018 7/16/2019 7/16/2018            Who to contact     If you have questions or need follow up information about today's clinic visit or your schedule please contact [facility] directly at [phone].  Normal or non-critical lab and imaging results will be communicated to you by MyChart, letter or phone within 4 business days after the clinic has received the results. If you do not hear from us within 7 days, please contact the clinic through MyChart or phone. If you have a critical or abnormal lab result, we will notify you by phone as soon as possible.  Submit refill requests through [employer] or call your pharmacy and they will forward the refill request to us. Please allow 3 business days for your refill to be  completed.          Additional Information About Your Visit        Care EveryWhere ID     This is your Care EveryWhere ID. This could be used by other organizations to access your [facility] medical records  [id number]        Your Vitals Were     Pulse Temperature Respirations Pulse Oximetry BMI (Body Mass Index)       85 98.3  F (36.8  C) (Oral) 12 96% 22.82 kg/m2        Blood Pressure from Last 3 Encounters:   07/16/18 115/70   03/05/18 117/70   08/14/17 112/64    Weight from Last 3 Encounters:   07/16/18 64.1 kg (141 lb 6.4 oz)   03/05/18 66.4 kg (146 lb 6.4 oz)   08/14/17 64.7 kg (142 lb 9.6 oz)               Primary Care Provider Office Phone # Fax #    Ginette Lemus PA-C [phone] [address]  [place] [address]        Equal Access to Services     SURY BAILEY : Hadii aad ku hadasho Soomaali, waaxda luqadaha, qaybta kaalmada adeegyada, waxay umain haydarnelln letitia soto . So [facility] [phone].    ATENCIÓN: Si maria de jesusla espmalinda, tiene a hernandez disposición servicios gratuitos de asistencia lingüística. Llame al [phone].    We comply with applicable federal civil rights laws and [place] laws. We do not discriminate on the basis of race, color, national origin, age, disability, sex, sexual orientation, or gender identity.            Thank you!     Thank you for choosing [facility]  for your care. Our goal is always to provide you with excellent care. Hearing back from our patients is one way we can continue to improve our services. Please take a few minutes to complete the written survey that you may receive in the mail after your visit with us. Thank you!             Your Updated Medication List - Protect others around you: Learn how to safely use, store and throw away your medicines at www.disposemymeds.org.          This list is accurate as of 7/16/18 11:22 AM.  Always use your most recent med list.                   Brand Name Dispense Instructions for use  Diagnosis    anastrozole 1 MG tablet    ARIMIDEX    90 tablet    Take 1 tablet (1 mg) by mouth daily    Malignant neoplasm of upper-inner quadrant of right breast in female, estrogen receptor positive (H)       CALCIUM + D3 PO      Take 1 tablet by mouth daily Dose 500 - 500 mg        clobetasol 0.05 % cream    TEMOVATE    15 g    Apply sparingly to affected area twice daily for 14 days.  Do not apply to face.    Insect bite, initial encounter       MULTIVITAMIN & MINERAL PO      Take 1 tablet by mouth daily.

## 2018-11-23 ENCOUNTER — ONCOLOGY VISIT (OUTPATIENT)
Dept: ONCOLOGY | Facility: CLINIC | Age: 83
End: 2018-11-23
Attending: INTERNAL MEDICINE
Payer: COMMERCIAL

## 2018-11-23 ENCOUNTER — HOSPITAL ENCOUNTER (OUTPATIENT)
Facility: CLINIC | Age: 83
Setting detail: SPECIMEN
Discharge: HOME | End: 2018-11-23
Attending: INTERNAL MEDICINE | Admitting: INTERNAL MEDICINE
Payer: COMMERCIAL

## 2018-11-23 DIAGNOSIS — C18.9 MALIGNANT NEOPLASM OF COLON, UNSPECIFIED PART OF COLON (H): ICD-10-CM

## 2018-11-23 LAB — CEA SERPL-MCNC: 1.7 UG/L (ref 0–2.5)

## 2018-11-23 PROCEDURE — 82378 CARCINOEMBRYONIC ANTIGEN: CPT | Performed by: INTERNAL MEDICINE

## 2018-11-23 PROCEDURE — 36415 COLL VENOUS BLD VENIPUNCTURE: CPT

## 2018-11-23 NOTE — LETTER
11/23/2018         RE: Wanda Dockery  [address] [place] [address]        Dear Colleague,    Thank you for referring your patient, Wanda Dockery, to the [facility]. Please see a copy of my visit note below.    Medical Assistant Note:  Wanda Dockery presents today for lab visit.    Patient seen by provider today: No.   present during visit today: Not Applicable.    Concerns: No Concerns.    Procedure:  Lab draw site: RAC, Needle type: BF, Gauge: 21 g gauze and coban applied.    Post Assessment:  Labs drawn without difficulty: Yes.    Discharge Plan:  Departure Mode: Ambulatory.    Face to Face Time: 4.    Asya Ibarra MA            Again, thank you for allowing me to participate in the care of your patient.        Sincerely,        Oncology Nurse

## 2018-11-23 NOTE — MR AVS SNAPSHOT
After Visit Summary   11/23/2018    Wanda Dockery    MRN: [id number]           Patient Information     Date Of Birth          1935        Visit Information        Provider Department      11/23/2018 11:15 AM Nurse, Rachelle Oncology [facility]        Today's Diagnoses     Malignant neoplasm of colon, unspecified part of colon (H)           Follow-ups after your visit        Your next 10 appointments already scheduled     Nov 26, 2018 10:00 AM CST   Return Visit with Angie Rodas MD   [facility] ([facility])    [facility] [place]  [address]  [place] [address]   [phone]              Who to contact     If you have questions or need follow up information about today's clinic visit or your schedule please contact [facility] directly at [phone].  Normal or non-critical lab and imaging results will be communicated to you by MyChart, letter or phone within 4 business days after the clinic has received the results. If you do not hear from us within 7 days, please contact the clinic through MyChart or phone. If you have a critical or abnormal lab result, we will notify you by phone as soon as possible.  Submit refill requests through Glob[employer] or call your pharmacy and they will forward the refill request to us. Please allow 3 business days for your refill to be completed.          Additional Information About Your Visit        Care EveryWhere ID     This is your Care EveryWhere ID. This could be used by other organizations to access your [facility] medical records  [id number]         Blood Pressure from Last 3 Encounters:   07/16/18 115/70   03/05/18 117/70   08/14/17 112/64    Weight from Last 3 Encounters:   07/16/18 64.1 kg (141 lb 6.4 oz)   03/05/18 66.4 kg (146 lb 6.4 oz)   08/14/17 64.7 kg (142 lb 9.6 oz)              We Performed the Following     CEA        Primary Care Provider Office Phone # Fax #    Ginette  Celia Lemus PA-C [phone]       [address]  [facility] [address]        Equal Access to Services     SURY BAILEY : Amber madiha baron ernst Evans, wakleberda luqadaha, qaybta kaalmada hu, marie michaudharjit ross. So [facility] [phone].    ATENCIÓN: Si habla español, tiene a hernandez disposición servicios gratuitos de asistencia lingüística. Llame al [phone].    We comply with applicable federal civil rights laws and [place] laws. We do not discriminate on the basis of race, color, national origin, age, disability, sex, sexual orientation, or gender identity.            Thank you!     Thank you for choosing [facility]  for your care. Our goal is always to provide you with excellent care. Hearing back from our patients is one way we can continue to improve our services. Please take a few minutes to complete the written survey that you may receive in the mail after your visit with us. Thank you!             Your Updated Medication List - Protect others around you: Learn how to safely use, store and throw away your medicines at www.disposemymeds.org.          This list is accurate as of 11/23/18 11:18 AM.  Always use your most recent med list.                   Brand Name Dispense Instructions for use Diagnosis    anastrozole 1 MG tablet    ARIMIDEX    90 tablet    Take 1 tablet (1 mg) by mouth daily    Malignant neoplasm of upper-inner quadrant of right breast in female, estrogen receptor positive (H)       CALCIUM + D3 PO      Take 1 tablet by mouth daily Dose 500 - 500 mg        clobetasol 0.05 % cream    TEMOVATE    15 g    Apply sparingly to affected area twice daily for 14 days.  Do not apply to face.    Insect bite, initial encounter       MULTIVITAMIN & MINERAL PO      Take 1 tablet by mouth daily.

## 2018-11-26 ENCOUNTER — HOSPITAL ENCOUNTER (OUTPATIENT)
Facility: CLINIC | Age: 83
Setting detail: SPECIMEN
End: 2018-11-26
Attending: INTERNAL MEDICINE
Payer: COMMERCIAL

## 2018-11-26 ENCOUNTER — ONCOLOGY VISIT (OUTPATIENT)
Dept: ONCOLOGY | Facility: CLINIC | Age: 83
End: 2018-11-26
Attending: INTERNAL MEDICINE
Payer: COMMERCIAL

## 2018-11-26 VITALS
OXYGEN SATURATION: 97 % | SYSTOLIC BLOOD PRESSURE: 122 MMHG | HEIGHT: 66 IN | RESPIRATION RATE: 16 BRPM | TEMPERATURE: 97.9 F | DIASTOLIC BLOOD PRESSURE: 77 MMHG | HEART RATE: 72 BPM | WEIGHT: 146 LBS | BODY MASS INDEX: 23.46 KG/M2

## 2018-11-26 DIAGNOSIS — Z12.39 SCREENING BREAST EXAMINATION: ICD-10-CM

## 2018-11-26 DIAGNOSIS — Z17.0 MALIGNANT NEOPLASM OF UPPER-INNER QUADRANT OF RIGHT BREAST IN FEMALE, ESTROGEN RECEPTOR POSITIVE (H): Primary | ICD-10-CM

## 2018-11-26 DIAGNOSIS — C50.211 MALIGNANT NEOPLASM OF UPPER-INNER QUADRANT OF RIGHT BREAST IN FEMALE, ESTROGEN RECEPTOR POSITIVE (H): Primary | ICD-10-CM

## 2018-11-26 DIAGNOSIS — C18.2 CANCER OF RIGHT COLON (H): ICD-10-CM

## 2018-11-26 PROCEDURE — G0463 HOSPITAL OUTPT CLINIC VISIT: HCPCS

## 2018-11-26 PROCEDURE — 99214 OFFICE O/P EST MOD 30 MIN: CPT | Performed by: INTERNAL MEDICINE

## 2018-11-26 RX ORDER — ANASTROZOLE 1 MG/1
1 TABLET ORAL DAILY
Qty: 90 TABLET | Refills: 3 | Status: SHIPPED | OUTPATIENT
Start: 2018-11-26 | End: 2019-08-19

## 2018-11-26 ASSESSMENT — PAIN SCALES - GENERAL: PAINLEVEL: NO PAIN (0)

## 2018-11-26 NOTE — PATIENT INSTRUCTIONS
Continue anastrazole.  Continue calcium with vitamin D.  Follow up in 6 months with labs. Scheduled /Tia  Mammogram end of march or April 2019.        Avs Printed for patient and given to patient

## 2018-11-26 NOTE — LETTER
"    11/26/2018         RE: Wanda Dockery  [address] [place] [address]        Dear Colleague,    Thank you for referring your patient, Wanda Dockery, to the [facility]INIC. Please see a copy of my visit note below.    Oncology Rooming Note    November 26, 2018 10:24 AM   Wanda Dockery is a 83 year old female who presents for:    Chief Complaint   Patient presents with     Oncology Clinic Visit     Initial Vitals: /77 (BP Location: Left arm, Patient Position: Sitting, Cuff Size: Adult Regular)  Pulse 72  Temp 97.9  F (36.6  C) (Oral)  Resp 16  Ht 1.676 m (5' 6\")  Wt 66.2 kg (146 lb)  SpO2 97%  BMI 23.57 kg/m2 Estimated body mass index is 23.57 kg/(m^2) as calculated from the following:    Height as of this encounter: 1.676 m (5' 6\").    Weight as of this encounter: 66.2 kg (146 lb). Body surface area is 1.76 meters squared.  No Pain (0) Comment: Data Unavailable   No LMP recorded. Patient is postmenopausal.  Allergies reviewed: Yes  Medications reviewed: Yes    Medications: Medication refills not needed today.  Pharmacy name entered into [facility]: [facility] [place], [place] - [address]    Clinical concerns: none Clark was notified.    10 minutes for nursing intake (face to face time)     Jessica Rhoades MA              Visit Date:   11/26/2018     ONCOLOGIC HISTORY:  Wanda Dockery is a female with:  -Stage IIB (pT2 pN1a) right breast cancer. ER positive and HER-2/mario positive  -Stage II (pT2 pN0 M0) colon cancer.  1. Bilateral diagnostic mammograms and right breast ultrasound on 06/23/2015 revealed a 3 cm lesion at 1 o'clock position and right axillary lymphadenopathy.   -Ultrasound-guided biopsy of right breast and axillary lymph node on 06/29/2015 revealed grade 3 invasive ductal carcinoma, both in the breast and axillary lymph node. ER positive and HER-2/mario equivocal by FISH. IHC is 3+ in the right breast. In the right lymph node IHC is 2+.     2. Bone scan on " 07/07/2015 did not reveal any bone metastasis.   -CT of the chest, abdomen and pelvis on 07/07/2015 revealed right breast lesion and 1.2 x 2 cm right axillary lymph node. There is thickening of the wall of the hepatitic flexure of the colon with associated inflammatory changes and multiple mildly prominent lymph nodes in the mesentery.   -Colonoscopy on 07/13/2015 revealed partially obstructing mass at the hepatic flexure and a sessile polyp in the cecum.  Hepatic flexure mass revealed poorly differentiated adenocarcinoma. 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 There is absence of expression of DNA mismatch repair protein MLH1 and PMS2.      3. Right breast lumpectomy, right lymph node dissection and right colectomy was done on 07/16/2015.    -Hemicolectomy specimen revealed high-grade adenocarcinoma measuring 7.5 cm extending into the santi-intestinal fatty tissue. Margins were negative. No lymphovascular invasion. Twenty-one benign lymph nodes. She has stage II (pT2 pN0 M0) colon cancer.   -Right breast reveals invasive ductal carcinoma, grade 2. It measures 2.1 cm. There was no DCIS. There was lymphovascular invasion present. Margins positive for invasive carcinoma. One of 7 lymph nodes positive. No extranodal extension. The patient has stage IIB (pT2 pN1a) breast cancer.   -Reexcision of lumpectomy site on 07/29/2015. There was no residual malignancy.      4. Taxol and Herceptin weekly x 12 between 08/17/2015 and 11/02/2015. One year of herceptin completed on 08/08/2016.  -Anastrazole started 11/30/2015.  -Radiation to breast cancer.  5040 cGy between 04/18/2016 and 06/15/2016.       5. DEXA scan on 04/25/2016 reveals osteopenia.  -Patient does not want bisphosphonate or prolia.  -DEXA scan on 08/07/2017 reveals stable osteopenia.      SUBJECTIVE:  Mrs. Dockery is an 83-year-old female with 2 different cancers.  She had colon cancer.  She had a right hemicolectomy in 07/2015.  She had stage II disease.  She has been  doing well.  There is no evidence of recurrence.  She did not receive any adjuvant treatment.      Patient also had right breast cancer diagnosed in 2015.  ER positive and HER-2/mario positive.  She had a right breast lumpectomy with lymph node dissection in 07/2015.  She had stage II disease.  She received adjuvant Taxol and Herceptin and radiation.  She is currently on anastrozole.  She is doing well.  There has been no evidence of recurrence.      Patient recently had pain in the right shoulder area.  She applied some Chinese medication including a patch.  After that she developed some erythema and itching in the right breast.  She removed the patch.  Rash in the right breast and itching has improved.  She is not having any breast pain.  No nipple discharge.  No problem with left breast.  She did not have any other accompanying symptoms like fever or chills.  No ulceration.      REVIEW OF SYSTEMS:    Patient feels good. No headache.  No dizziness.  No chest pain.  No shortness of breath.  No nausea.  No vomiting.  Appetite is good.  No urinary or bowel complaints.  No bleeding.  No fever, chills or night sweats.      PHYSICAL EXAMINATION:   Alert and oriented x 3.   VITAL SIGNS: Reviewed. ECOG PS of 1.  EYES: No icterus.   THROAT: No ulcer or thrush.   NECK: Supple. No lymphadenopathy or thyromegaly.   AXILLAE: No lymphadenopathy.   LUNGS: Good air entry bilaterally. No crackles or wheezing.   HEART: Regular. No murmur.   ABDOMEN: Soft. Nontender. No mass.   EXTREMITIES: No edema. No calf swelling or tenderness.   SKIN: No petechiae.  BREASTS:  Examined in the presence of the nurse.   -Left breast exam is normal.  No skin rash.  No mass.  No nipple discharge.   -Right breast exam does not reveal any mass.  No nipple discharge.  No area of tenderness.  There is a mild diffuse erythema in the breast.  As per the patient, it is better.  There is no ulcer.  No vesicles.   -No axillary lymphadenopathy.      LABORATORY  DATA:  Reviewed.  CEA has decreased to 1.7.      ASSESSMENT:   1.  An 83-year-old female with a stage IIB right breast cancer diagnosed in .  ER positive and HER-2/mario positive.  No evidence of recurrence.   2.  Stage II colon cancer, status post right hemicolectomy in 2015.  No evidence of recurrence.     3.  Rash on the right breast.  This is an allergic rash from topical Chinese medication that she applied to the right shoulder.   4.  Osteopenia.   5.  Benign thyroid nodule.      PLAN:   1.  I discussed with her regarding breast cancer.  She is doing well.  No evidence of recurrence of breast cancer. She is on anastrozole.  She is tolerating it well.  She will continue on it. She will get a mammogram end of 2019 or beginning 2019.   2.  Discussed regarding rash in the breast.  This is allergic in nature.  It is already improving.  It is should resolve. Patient advised to see a physician if it does not resolve.  Advised her not to use the topical Chinese medication.   3.  Discussed regarding colon cancer.  She is doing well.  No clinical evidence of recurrence.  CEA is better.  We will plan on getting a colonoscopy and a CT scan in July or .   4.  Patient has osteopenia.  She will continue on calcium and vitamin D twice a day.  She does not want any bisphosphonate or Prolia.   5.  She had multiple questions, which were all answered.  I will see her in 6 months' time.  Advised her to see a physician sooner if she has any fever, chills, recurrent vomiting, bleeding, worsening weakness or any other concerns.         WILFREDO COLON MD             D: 2018   T: 2018   MT: HEATHER      Name:     ALTAF DEL ROSARIO   MRN:      [id number]        Account:      [id number]   :      1935           Visit Date:   2018      Document: [id number]        Again, thank you for allowing me to participate in the care of your patient.        Sincerely,        Wilfredo Colon MD

## 2018-11-26 NOTE — MR AVS SNAPSHOT
After Visit Summary   11/26/2018    Wanda Dockery    MRN: [id number]           Patient Information     Date Of Birth          1935        Visit Information        Provider Department      11/26/2018 10:00 AM Angie Rodas MD [facility]        Today's Diagnoses     Malignant neoplasm of upper-inner quadrant of right breast in female, estrogen receptor positive (H)    -  1    Cancer of right colon (H)          Care Instructions    Continue anastrazole.  Continue calcium with vitamin D.  Follow up in 6 months with labs. Scheduled /Tia  Mammogram end of march or April 2019.        Avs Printed for patient and given to patient          Follow-ups after your visit        Your next 10 appointments already scheduled     May 24, 2019  1:00 PM CDT   Return Visit with  Oncology Nurse   [facility] ([facility])    [facility] [place]  [address]  [place] [address]   [phone]            May 29, 2019  8:40 AM CDT   Return Visit with Angie Rodas MD   [facility] ([facility])    [facility] [place]  [address]  [facility] [address]   [phone]              Who to contact     If you have questions or need follow up information about today's clinic visit or your schedule please contact [facility] directly at [phone].  Normal or non-critical lab and imaging results will be communicated to you by MyChart, letter or phone within 4 business days after the clinic has received the results. If you do not hear from us within 7 days, please contact the clinic through MyChart or phone. If you have a critical or abnormal lab result, we will notify you by phone as soon as possible.  Submit refill requests through [employer] or call your pharmacy and they will forward the refill request to us. Please allow 3 business days for your refill to be completed.          Additional  "Information About Your Visit        Care EveryWhere ID     This is your Care EveryWhere ID. This could be used by other organizations to access your [place] medical records  [id number]        Your Vitals Were     Pulse Temperature Respirations Height Pulse Oximetry BMI (Body Mass Index)    72 97.9  F (36.6  C) (Oral) 16 1.676 m (5' 6\") 97% 23.57 kg/m2       Blood Pressure from Last 3 Encounters:   11/26/18 122/77   07/16/18 115/70   03/05/18 117/70    Weight from Last 3 Encounters:   11/26/18 66.2 kg (146 lb)   07/16/18 64.1 kg (141 lb 6.4 oz)   03/05/18 66.4 kg (146 lb 6.4 oz)                 Where to get your medicines      These medications were sent to [facility] - [address]  [address], [place] [address]     Phone:  [phone]     anastrozole 1 MG tablet          Primary Care Provider Office Phone # Fax #    Ginette Lemus PA-C [phone] [address]  [place] [address]        Equal Access to Services     SURY BAILEY : Hadii madiha baron hadcassandrao Soleroyali, waaxda luqadaha, qaybta kaalmada adeegyada, marie ross. So [facility] [phone].    ATENCIÓN: Si habla español, tiene a hernandez disposición servicios gratuitos de asistencia lingüística. Llame al [phone].    We comply with applicable federal civil rights laws and [place] laws. We do not discriminate on the basis of race, color, national origin, age, disability, sex, sexual orientation, or gender identity.            Thank you!     Thank you for choosing [facility]  for your care. Our goal is always to provide you with excellent care. Hearing back from our patients is one way we can continue to improve our services. Please take a few minutes to complete the written survey that you may receive in the mail after your visit with us. Thank you!             Your Updated Medication List - Protect others around you: Learn how to safely use, " store and throw away your medicines at www.disposemymeds.org.          This list is accurate as of 11/26/18 11:59 PM.  Always use your most recent med list.                   Brand Name Dispense Instructions for use Diagnosis    anastrozole 1 MG tablet    ARIMIDEX    90 tablet    Take 1 tablet (1 mg) by mouth daily    Malignant neoplasm of upper-inner quadrant of right breast in female, estrogen receptor positive (H)       CALCIUM + D3 PO      Take 1 tablet by mouth daily Dose 500 - 500 mg        clobetasol 0.05 % external cream    TEMOVATE    15 g    Apply sparingly to affected area twice daily for 14 days.  Do not apply to face.    Insect bite, initial encounter       MULTIVITAMIN & MINERAL PO      Take 1 tablet by mouth daily.

## 2018-11-26 NOTE — PROGRESS NOTES
"Oncology Rooming Note    November 26, 2018 10:24 AM   Wanda Dockery is a 83 year old female who presents for:    Chief Complaint   Patient presents with     Oncology Clinic Visit     Initial Vitals: /77 (BP Location: Left arm, Patient Position: Sitting, Cuff Size: Adult Regular)  Pulse 72  Temp 97.9  F (36.6  C) (Oral)  Resp 16  Ht 1.676 m (5' 6\")  Wt 66.2 kg (146 lb)  SpO2 97%  BMI 23.57 kg/m2 Estimated body mass index is 23.57 kg/(m^2) as calculated from the following:    Height as of this encounter: 1.676 m (5' 6\").    Weight as of this encounter: 66.2 kg (146 lb). Body surface area is 1.76 meters squared.  No Pain (0) Comment: Data Unavailable   No LMP recorded. Patient is postmenopausal.  Allergies reviewed: Yes  Medications reviewed: Yes    Medications: Medication refills not needed today.  Pharmacy name entered into [facility]: [facility] [place], [place] - [address]    Clinical concerns: none Clark was notified.    10 minutes for nursing intake (face to face time)     Jessica Rhoades MA            "

## 2018-11-27 NOTE — PROGRESS NOTES
Visit Date:   11/26/2018     ONCOLOGIC HISTORY:  Wanda Dockery is a female with:  -Stage IIB (pT2 pN1a) right breast cancer. ER positive and HER-2/mario positive  -Stage II (pT2 pN0 M0) colon cancer.  1. Bilateral diagnostic mammograms and right breast ultrasound on 06/23/2015 revealed a 3 cm lesion at 1 o'clock position and right axillary lymphadenopathy.   -Ultrasound-guided biopsy of right breast and axillary lymph node on 06/29/2015 revealed grade 3 invasive ductal carcinoma, both in the breast and axillary lymph node. ER positive and HER-2/mario equivocal by FISH. IHC is 3+ in the right breast. In the right lymph node IHC is 2+.     2. Bone scan on 07/07/2015 did not reveal any bone metastasis.   -CT of the chest, abdomen and pelvis on 07/07/2015 revealed right breast lesion and 1.2 x 2 cm right axillary lymph node. There is thickening of the wall of the hepatitic flexure of the colon with associated inflammatory changes and multiple mildly prominent lymph nodes in the mesentery.   -Colonoscopy on 07/13/2015 revealed partially obstructing mass at the hepatic flexure and a sessile polyp in the cecum.  Hepatic flexure mass revealed poorly differentiated adenocarcinoma. 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 There is absence of expression of DNA mismatch repair protein MLH1 and PMS2.      3. Right breast lumpectomy, right lymph node dissection and right colectomy was done on 07/16/2015.    -Hemicolectomy specimen revealed high-grade adenocarcinoma measuring 7.5 cm extending into the santi-intestinal fatty tissue. Margins were negative. No lymphovascular invasion. Twenty-one benign lymph nodes. She has stage II (pT2 pN0 M0) colon cancer.   -Right breast reveals invasive ductal carcinoma, grade 2. It measures 2.1 cm. There was no DCIS. There was lymphovascular invasion present. Margins positive for invasive carcinoma. One of 7 lymph nodes positive. No extranodal extension. The patient has stage IIB (pT2 pN1a) breast cancer.    -Reexcision of lumpectomy site on 07/29/2015. There was no residual malignancy.      4. Taxol and Herceptin weekly x 12 between 08/17/2015 and 11/02/2015. One year of herceptin completed on 08/08/2016.  -Anastrazole started 11/30/2015.  -Radiation to breast cancer.  5040 cGy between 04/18/2016 and 06/15/2016.       5. DEXA scan on 04/25/2016 reveals osteopenia.  -Patient does not want bisphosphonate or prolia.  -DEXA scan on 08/07/2017 reveals stable osteopenia.      SUBJECTIVE:  Mrs. Dockery is an 83-year-old female with 2 different cancers.  She had colon cancer.  She had a right hemicolectomy in 07/2015.  She had stage II disease.  She has been doing well.  There is no evidence of recurrence.  She did not receive any adjuvant treatment.      Patient also had right breast cancer diagnosed in 2015.  ER positive and HER-2/mario positive.  She had a right breast lumpectomy with lymph node dissection in 07/2015.  She had stage II disease.  She received adjuvant Taxol and Herceptin and radiation.  She is currently on anastrozole.  She is doing well.  There has been no evidence of recurrence.      Patient recently had pain in the right shoulder area.  She applied some Chinese medication including a patch.  After that she developed some erythema and itching in the right breast.  She removed the patch.  Rash in the right breast and itching has improved.  She is not having any breast pain.  No nipple discharge.  No problem with left breast.  She did not have any other accompanying symptoms like fever or chills.  No ulceration.      REVIEW OF SYSTEMS:    Patient feels good. No headache.  No dizziness.  No chest pain.  No shortness of breath.  No nausea.  No vomiting.  Appetite is good.  No urinary or bowel complaints.  No bleeding.  No fever, chills or night sweats.      PHYSICAL EXAMINATION:   Alert and oriented x 3.   VITAL SIGNS: Reviewed. ECOG PS of 1.  EYES: No icterus.   THROAT: No ulcer or thrush.   NECK: Supple. No  lymphadenopathy or thyromegaly.   AXILLAE: No lymphadenopathy.   LUNGS: Good air entry bilaterally. No crackles or wheezing.   HEART: Regular. No murmur.   ABDOMEN: Soft. Nontender. No mass.   EXTREMITIES: No edema. No calf swelling or tenderness.   SKIN: No petechiae.  BREASTS:  Examined in the presence of the nurse.   -Left breast exam is normal.  No skin rash.  No mass.  No nipple discharge.   -Right breast exam does not reveal any mass.  No nipple discharge.  No area of tenderness.  There is a mild diffuse erythema in the breast.  As per the patient, it is better.  There is no ulcer.  No vesicles.   -No axillary lymphadenopathy.      LABORATORY DATA:  Reviewed.  CEA has decreased to 1.7.      ASSESSMENT:   1.  An 83-year-old female with a stage IIB right breast cancer diagnosed in 2015.  ER positive and HER-2/mario positive.  No evidence of recurrence.   2.  Stage II colon cancer, status post right hemicolectomy in 07/2015.  No evidence of recurrence.     3.  Rash on the right breast.  This is an allergic rash from topical Chinese medication that she applied to the right shoulder.   4.  Osteopenia.   5.  Benign thyroid nodule.      PLAN:   1.  I discussed with her regarding breast cancer.  She is doing well.  No evidence of recurrence of breast cancer. She is on anastrozole.  She is tolerating it well.  She will continue on it. She will get a mammogram end of 03/2019 or beginning 04/2019.   2.  Discussed regarding rash in the breast.  This is allergic in nature.  It is already improving.  It is should resolve. Patient advised to see a physician if it does not resolve.  Advised her not to use the topical Chinese medication.   3.  Discussed regarding colon cancer.  She is doing well.  No clinical evidence of recurrence.  CEA is better.  We will plan on getting a colonoscopy and a CT scan in July or august, 2019.   4.  Patient has osteopenia.  She will continue on calcium and vitamin D twice a day.  She does not want  any bisphosphonate or Prolia.   5.  She had multiple questions, which were all answered.  I will see her in 6 months' time.  Advised her to see a physician sooner if she has any fever, chills, recurrent vomiting, bleeding, worsening weakness or any other concerns.         WILFREDO COLON MD             D: 2018   T: 2018   MT: HEATHER      Name:     ALTAF DEL ROSARIO   MRN:      [id number]        Account:      [id number]   :      1935           Visit Date:   2018      Document: [id number]

## 2019-05-24 ENCOUNTER — INFUSION THERAPY VISIT (OUTPATIENT)
Dept: INFUSION THERAPY | Facility: CLINIC | Age: 84
End: 2019-05-24
Attending: INTERNAL MEDICINE
Payer: COMMERCIAL

## 2019-05-24 DIAGNOSIS — C18.2 CANCER OF RIGHT COLON (H): ICD-10-CM

## 2019-05-24 DIAGNOSIS — Z17.0 MALIGNANT NEOPLASM OF UPPER-INNER QUADRANT OF RIGHT BREAST IN FEMALE, ESTROGEN RECEPTOR POSITIVE (H): ICD-10-CM

## 2019-05-24 DIAGNOSIS — C50.211 MALIGNANT NEOPLASM OF UPPER-INNER QUADRANT OF RIGHT BREAST IN FEMALE, ESTROGEN RECEPTOR POSITIVE (H): ICD-10-CM

## 2019-05-24 LAB
ALBUMIN SERPL-MCNC: 3.9 G/DL (ref 3.4–5)
ALP SERPL-CCNC: 75 U/L (ref 40–150)
ALT SERPL W P-5'-P-CCNC: 26 U/L (ref 0–50)
ANION GAP SERPL CALCULATED.3IONS-SCNC: 4 MMOL/L (ref 3–14)
AST SERPL W P-5'-P-CCNC: 18 U/L (ref 0–45)
BILIRUB SERPL-MCNC: 0.4 MG/DL (ref 0.2–1.3)
BUN SERPL-MCNC: 19 MG/DL (ref 7–30)
CALCIUM SERPL-MCNC: 9.3 MG/DL (ref 8.5–10.1)
CEA SERPL-MCNC: 1.7 UG/L (ref 0–2.5)
CHLORIDE SERPL-SCNC: 107 MMOL/L (ref 94–109)
CO2 SERPL-SCNC: 29 MMOL/L (ref 20–32)
CREAT SERPL-MCNC: 0.75 MG/DL (ref 0.52–1.04)
ERYTHROCYTE [DISTWIDTH] IN BLOOD BY AUTOMATED COUNT: 12.9 % (ref 10–15)
GFR SERPL CREATININE-BSD FRML MDRD: 74 ML/MIN/{1.73_M2}
GLUCOSE SERPL-MCNC: 95 MG/DL (ref 70–99)
HCT VFR BLD AUTO: 39.7 % (ref 35–47)
HGB BLD-MCNC: 13.7 G/DL (ref 11.7–15.7)
MCH RBC QN AUTO: 31.9 PG (ref 26.5–33)
MCHC RBC AUTO-ENTMCNC: 34.5 G/DL (ref 31.5–36.5)
MCV RBC AUTO: 93 FL (ref 78–100)
PLATELET # BLD AUTO: 338 10E9/L (ref 150–450)
POTASSIUM SERPL-SCNC: 4.3 MMOL/L (ref 3.4–5.3)
PROT SERPL-MCNC: 8.2 G/DL (ref 6.8–8.8)
RBC # BLD AUTO: 4.29 10E12/L (ref 3.8–5.2)
SODIUM SERPL-SCNC: 140 MMOL/L (ref 133–144)
WBC # BLD AUTO: 4.4 10E9/L (ref 4–11)

## 2019-05-24 PROCEDURE — 80053 COMPREHEN METABOLIC PANEL: CPT | Performed by: INTERNAL MEDICINE

## 2019-05-24 PROCEDURE — 82378 CARCINOEMBRYONIC ANTIGEN: CPT | Performed by: INTERNAL MEDICINE

## 2019-05-24 PROCEDURE — 85027 COMPLETE CBC AUTOMATED: CPT | Performed by: INTERNAL MEDICINE

## 2019-05-24 PROCEDURE — 36415 COLL VENOUS BLD VENIPUNCTURE: CPT

## 2019-05-24 NOTE — PROGRESS NOTES
Medical Assistant Note:  Wanda LOPEZ Sarkis presents today for blood draw.    Patient seen by provider today: No.   present during visit today: Not Applicable.    Concerns: No Concerns.    Procedure:  Lab draw site: LAC, Needle type: BF, Gauge: 21.    Post Assessment:  Labs drawn without difficulty: Yes.    Discharge Plan:  Departure Mode: Ambulatory.    Face to Face Time: 5MIN.    Mila Lindo

## 2019-05-25 ENCOUNTER — HOSPITAL ENCOUNTER (OUTPATIENT)
Facility: CLINIC | Age: 84
Setting detail: SPECIMEN
Discharge: HOME | End: 2019-05-25
Attending: INTERNAL MEDICINE | Admitting: INTERNAL MEDICINE
Payer: COMMERCIAL

## 2019-05-28 ENCOUNTER — HOSPITAL ENCOUNTER (OUTPATIENT)
Dept: MAMMOGRAPHY | Facility: CLINIC | Age: 84
Discharge: HOME | End: 2019-05-28
Attending: INTERNAL MEDICINE | Admitting: INTERNAL MEDICINE
Payer: COMMERCIAL

## 2019-05-28 DIAGNOSIS — Z17.0 MALIGNANT NEOPLASM OF UPPER-INNER QUADRANT OF RIGHT BREAST IN FEMALE, ESTROGEN RECEPTOR POSITIVE (H): ICD-10-CM

## 2019-05-28 DIAGNOSIS — Z12.13 SPECIAL SCREENING FOR MALIGNANT NEOPLASMS, SMALL INTESTINE: ICD-10-CM

## 2019-05-28 DIAGNOSIS — Z12.39 SCREENING BREAST EXAMINATION: ICD-10-CM

## 2019-05-28 DIAGNOSIS — C50.211 MALIGNANT NEOPLASM OF UPPER-INNER QUADRANT OF RIGHT BREAST IN FEMALE, ESTROGEN RECEPTOR POSITIVE (H): ICD-10-CM

## 2019-05-28 PROCEDURE — 77067 SCR MAMMO BI INCL CAD: CPT

## 2019-05-29 ENCOUNTER — HOSPITAL ENCOUNTER (OUTPATIENT)
Facility: CLINIC | Age: 84
Setting detail: SPECIMEN
End: 2019-05-29
Attending: INTERNAL MEDICINE
Payer: COMMERCIAL

## 2019-05-29 ENCOUNTER — ONCOLOGY VISIT (OUTPATIENT)
Dept: ONCOLOGY | Facility: CLINIC | Age: 84
End: 2019-05-29
Attending: INTERNAL MEDICINE
Payer: COMMERCIAL

## 2019-05-29 VITALS
OXYGEN SATURATION: 97 % | HEART RATE: 79 BPM | TEMPERATURE: 97.8 F | HEIGHT: 66 IN | RESPIRATION RATE: 16 BRPM | SYSTOLIC BLOOD PRESSURE: 114 MMHG | WEIGHT: 142.8 LBS | DIASTOLIC BLOOD PRESSURE: 74 MMHG | BODY MASS INDEX: 22.95 KG/M2

## 2019-05-29 DIAGNOSIS — M85.89 OSTEOPENIA OF MULTIPLE SITES: ICD-10-CM

## 2019-05-29 DIAGNOSIS — C18.9 MALIGNANT NEOPLASM OF COLON, UNSPECIFIED PART OF COLON (H): Primary | ICD-10-CM

## 2019-05-29 PROCEDURE — 99214 OFFICE O/P EST MOD 30 MIN: CPT | Performed by: INTERNAL MEDICINE

## 2019-05-29 PROCEDURE — 40000809 ZZH STATISTIC NO DOCUMENTATION TO SUPPORT CHARGE

## 2019-05-29 ASSESSMENT — MIFFLIN-ST. JEOR: SCORE: 1124.49

## 2019-05-29 ASSESSMENT — PAIN SCALES - GENERAL: PAINLEVEL: NO PAIN (0)

## 2019-05-29 NOTE — Clinical Note
"    5/29/2019         RE: Wanda Dockery  [address] [place] [address]        Dear Colleague,    Thank you for referring your patient, Wanda Dockery, to the [facility]. Please see a copy of my visit note below.    Oncology Rooming Note    May 29, 2019 8:44 AM   Wanda Dockery is a 83 year old female who presents for:    Chief Complaint   Patient presents with     Oncology Clinic Visit     Initial Vitals: /74   Pulse 79   Temp 97.8  F (36.6  C) (Oral)   Resp 16   Ht 1.676 m (5' 6\")   Wt 64.8 kg (142 lb 12.8 oz)   SpO2 97%   BMI 23.05 kg/m    Estimated body mass index is 23.05 kg/m  as calculated from the following:    Height as of this encounter: 1.676 m (5' 6\").    Weight as of this encounter: 64.8 kg (142 lb 12.8 oz). Body surface area is 1.74 meters squared.  No Pain (0) Comment: Data Unavailable   No LMP recorded. Patient is postmenopausal.  Allergies reviewed: Yes  Medications reviewed: Yes    Medications: Medication refills not needed today.  Pharmacy name entered into [employer]: [facility] [place] - [address] [facility]    Clinical concerns: no      Sherin Kemp, [facility]              Visit Date:   05/29/2019      SUBJECTIVE:  Ms. Dockery is an 83-year-old female with history of stage IIB right breast cancer and stage II colon cancer.  Both the cancers were diagnosed in 2015.  She is doing well.  There has been no evidence of recurrence.        She has no complaints or concerns.  No headache or dizziness.  No chest pain, difficulty breathing.  No abdominal pain, nausea or vomiting.  Appetite good.  No urinary or bowel complaints.  No bleeding.  No fever, chills or night sweats.  She had mammogram done yesterday, which is normal.      PHYSICAL EXAMINATION:  Unchanged.  Breasts not examined.      LABORATORY DATA:  Reviewed.      ASSESSMENT:   1.  An 83-year-old female with right breast cancer.  ER positive and HER-2/mario positive.  She had lumpectomy followed by " chemotherapy and Herceptin and radiation.  She is on anastrozole.  No evidence of recurrence.   2.  Stage II right colon cancer, status post right hemicolectomy.  No evidence of recurrence.   3.  Osteopenia.      PLAN:   1.  The patient is doing very well.  No evidence of recurrence of breast or colon cancer.   2.  For breast cancer, she is on anastrozole.  She will continue on it.  She is tolerating it well.  Side effects reviewed.   3.  Discussed regarding bone health.  She will continue on calcium and vitamin D.  She has osteopenia.  She does not want bisphosphonate or Prolia.  We will monitor her bone density.   4.  For colon cancer followup, we will get CT scan and colonoscopy in mid August.  She is agreeable for it.   5.  She had multiple questions, which were all answered.  I will see her in August after the CT scan.  Advised her to see a physician if she has fever, chills, worsening weakness, shortness of breath, recurrent vomiting, bleeding, change in the bowel habits or any other concerns.         WILFREDO COLON MD             D: 2019   T: 2019   MT: LIBRADO      Name:     ALTAF DEL ROSARIO   MRN:      [id number]        Account:      [id number]   :      1935           Visit Date:   2019      Document: [id number]       Again, thank you for allowing me to participate in the care of your patient.        Sincerely,        Wilfredo Colon MD

## 2019-05-29 NOTE — PATIENT INSTRUCTIONS
Labs, colonoscopy and CT scan in mid-august 2019.  Continue anastrazole.  Continue calcium and vitamin D.  Follow up after CT scan.

## 2019-05-29 NOTE — PROGRESS NOTES
Visit Date:   05/29/2019     ONCOLOGIC HISTORY:  Wanda Dockery is a female with:  -Stage IIB (pT2 pN1a) right breast cancer. ER positive and HER-2/mario positive  -Stage II (pT2 pN0 M0) colon cancer.  1. Bilateral diagnostic mammograms and right breast ultrasound on 06/23/2015 revealed a 3 cm lesion at 1 o'clock position and right axillary lymphadenopathy.   -Ultrasound-guided biopsy of right breast and axillary lymph node on 06/29/2015 revealed grade 3 invasive ductal carcinoma, both in the breast and axillary lymph node. ER positive and HER-2/mario equivocal by FISH. IHC is 3+ in the right breast. In the right lymph node IHC is 2+.     2. Bone scan on 07/07/2015 did not reveal any bone metastasis.   -CT of the chest, abdomen and pelvis on 07/07/2015 revealed right breast lesion and 1.2 x 2 cm right axillary lymph node. There is thickening of the wall of the hepatitic flexure of the colon with associated inflammatory changes and multiple mildly prominent lymph nodes in the mesentery.   -Colonoscopy on 07/13/2015 revealed partially obstructing mass at the hepatic flexure and a sessile polyp in the cecum.  Hepatic flexure mass revealed poorly differentiated adenocarcinoma. 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 There is absence of expression of DNA mismatch repair protein MLH1 and PMS2.      3. Right breast lumpectomy, right lymph node dissection and right colectomy was done on 07/16/2015.    -Hemicolectomy specimen revealed high-grade adenocarcinoma measuring 7.5 cm extending into the santi-intestinal fatty tissue. Margins were negative. No lymphovascular invasion. Twenty-one benign lymph nodes. She has stage II (pT2 pN0 M0) colon cancer.   -Right breast reveals invasive ductal carcinoma, grade 2. It measures 2.1 cm. There was no DCIS. There was lymphovascular invasion present. Margins positive for invasive carcinoma. One of 7 lymph nodes positive. No extranodal extension. The patient has stage IIB (pT2 pN1a) breast cancer.    -Reexcision of lumpectomy site on 07/29/2015. There was no residual malignancy.      4. Taxol and Herceptin weekly x 12 between 08/17/2015 and 11/02/2015. One year of herceptin completed on 08/08/2016.  -Anastrazole started 11/30/2015.  -Radiation to breast cancer.  5040 cGy between 04/18/2016 and 06/15/2016.       5. DEXA scan on 04/25/2016 reveals osteopenia.  -Patient does not want bisphosphonate or prolia.  -DEXA scan on 08/07/2017 reveals stable osteopenia.     SUBJECTIVE:  Ms. Dockery is an 83-year-old female with history of stage IIB right breast cancer and stage II colon cancer.  Both the cancers were diagnosed in 2015.  She is doing well.  There has been no evidence of recurrence.        She has no complaints or concerns.  No headache or dizziness.  No chest pain or difficulty breathing.  No abdominal pain, nausea or vomiting.  Appetite is good.  No urinary or bowel complaints.  No bleeding.  No fever, chills or night sweats.  She had mammogram done yesterday, which is normal.      PHYSICAL EXAMINATION:   Alert and oriented x 3.   VITAL SIGNS: Reviewed. ECOG PS of 1.  EYES: No icterus.   THROAT: No ulcer or thrush.   NECK: Supple. No lymphadenopathy or thyromegaly.   AXILLAE: No lymphadenopathy.   LUNGS: Good air entry bilaterally. No crackles or wheezing.   HEART: Regular. No murmur.   ABDOMEN: Soft. Nontender. No mass.   EXTREMITIES: No edema. No calf swelling or tenderness.   SKIN: No petechiae.     LABORATORY DATA:  Reviewed.      ASSESSMENT:   1.  An 83-year-old female with right breast cancer.  ER positive and HER-2/mario positive. She is on anastrozole.  No evidence of recurrence.   2.  Stage II right colon cancer, status post right hemicolectomy.  No evidence of recurrence.   3.  Osteopenia.      PLAN:   1.  The patient is doing very well.  No evidence of recurrence of breast or colon cancer.   2.  For breast cancer, she is on anastrozole.  She will continue on it.  She is tolerating it well.  Side  effects reviewed.   3.  Discussed regarding bone health.  She will continue on calcium and vitamin D.  She has osteopenia.  She does not want bisphosphonate or Prolia.  We will monitor her bone density.   4.  For colon cancer followup, we will get CT scan and colonoscopy in mid August.  She is agreeable for it.   5.  She had multiple questions, which were all answered.  I will see her in August after the CT scan.  Advised her to see a physician if she has fever, chills, worsening weakness, shortness of breath, recurrent vomiting, bleeding, change in the bowel habits or any other concerns.         WILFREDO COLON MD             D: 2019   T: 2019   MT: LIBRADO      Name:     ALTAF DEL ROSARIO   MRN:      [id number]        Account:      [id number]   :      1935           Visit Date:   2019      Document: [id number]

## 2019-05-29 NOTE — PROGRESS NOTES
"Oncology Rooming Note    May 29, 2019 8:44 AM   Wanda Dockery is a 83 year old female who presents for:    Chief Complaint   Patient presents with     Oncology Clinic Visit     Initial Vitals: /74   Pulse 79   Temp 97.8  F (36.6  C) (Oral)   Resp 16   Ht 1.676 m (5' 6\")   Wt 64.8 kg (142 lb 12.8 oz)   SpO2 97%   BMI 23.05 kg/m   Estimated body mass index is 23.05 kg/m  as calculated from the following:    Height as of this encounter: 1.676 m (5' 6\").    Weight as of this encounter: 64.8 kg (142 lb 12.8 oz). Body surface area is 1.74 meters squared.  No Pain (0) Comment: Data Unavailable   No LMP recorded. Patient is postmenopausal.  Allergies reviewed: Yes  Medications reviewed: Yes    Medications: Medication refills not needed today.  Pharmacy name entered into [employer]: [facility] [place], [place] - [address] [address]    Clinical concerns: no      Sherin Kemp CMA            "

## 2019-08-09 ENCOUNTER — HOSPITAL ENCOUNTER (OUTPATIENT)
Dept: BONE DENSITY | Facility: CLINIC | Age: 84
Discharge: HOME | End: 2019-08-09
Attending: INTERNAL MEDICINE | Admitting: INTERNAL MEDICINE
Payer: COMMERCIAL

## 2019-08-09 DIAGNOSIS — M85.89 OSTEOPENIA OF MULTIPLE SITES: ICD-10-CM

## 2019-08-09 PROCEDURE — 77080 DXA BONE DENSITY AXIAL: CPT

## 2019-08-12 ENCOUNTER — HOSPITAL ENCOUNTER (OUTPATIENT)
Facility: CLINIC | Age: 84
Discharge: HOME | End: 2019-08-12
Attending: COLON & RECTAL SURGERY | Admitting: COLON & RECTAL SURGERY
Payer: COMMERCIAL

## 2019-08-12 VITALS
WEIGHT: 135 LBS | DIASTOLIC BLOOD PRESSURE: 57 MMHG | SYSTOLIC BLOOD PRESSURE: 104 MMHG | HEART RATE: 74 BPM | RESPIRATION RATE: 22 BRPM | OXYGEN SATURATION: 94 % | HEIGHT: 66 IN | BODY MASS INDEX: 21.69 KG/M2

## 2019-08-12 LAB — COLONOSCOPY: NORMAL

## 2019-08-12 PROCEDURE — 45380 COLONOSCOPY AND BIOPSY: CPT | Mod: PT | Performed by: COLON & RECTAL SURGERY

## 2019-08-12 PROCEDURE — 25000128 H RX IP 250 OP 636: Performed by: COLON & RECTAL SURGERY

## 2019-08-12 PROCEDURE — 88305 TISSUE EXAM BY PATHOLOGIST: CPT | Mod: 26 | Performed by: COLON & RECTAL SURGERY

## 2019-08-12 PROCEDURE — G0500 MOD SEDAT ENDO SERVICE >5YRS: HCPCS | Performed by: COLON & RECTAL SURGERY

## 2019-08-12 PROCEDURE — 88305 TISSUE EXAM BY PATHOLOGIST: CPT | Performed by: COLON & RECTAL SURGERY

## 2019-08-12 RX ORDER — LIDOCAINE 40 MG/G
CREAM TOPICAL
Status: DISCONTINUED | OUTPATIENT
Start: 2019-08-12 | End: 2019-08-12 | Stop reason: HOSPADM

## 2019-08-12 RX ORDER — ONDANSETRON 2 MG/ML
4 INJECTION INTRAMUSCULAR; INTRAVENOUS
Status: DISCONTINUED | OUTPATIENT
Start: 2019-08-12 | End: 2019-08-12 | Stop reason: HOSPADM

## 2019-08-12 RX ORDER — FENTANYL CITRATE 50 UG/ML
INJECTION, SOLUTION INTRAMUSCULAR; INTRAVENOUS PRN
Status: DISCONTINUED | OUTPATIENT
Start: 2019-08-12 | End: 2019-08-12 | Stop reason: HOSPADM

## 2019-08-12 ASSESSMENT — MIFFLIN-ST. JEOR: SCORE: 1084.11

## 2019-08-12 NOTE — H&P
Colon & Rectal Surgery History and Physical  Pre-Endoscopy Procedure Note    History of Present Illness   I have been asked by Dr. Rodas to evaluate this 84 year old female for colorectal cancer surveillance. She had a Stage II colon cancer and right hemicolectomy in 2015. Subsequent colonoscopy in 2016 revealed a single adenomatous polyp.  Since that time, she denies any abdominal pain, weight loss, bleeding per rectum, or recent change in bowel habits.    Past Medical History  Diagnosis Date     Breast cancer (H) 07/2015     Colon cancer (H) 7/2015     Hyperlipidemia LDL goal < 130      Osteopenia      Ovarian tumor (benign)     s/p unilateral oophorectomy     S/P lumpectomy of breast     non-malignant       Past Surgical History  Procedure Laterality Date     BIOPSY Right 6/29/2015    Right Breast and Right Axillary Node     CATARACT IOL, RT/LT       INSERT PORT VASCULAR ACCESS N/A 8/14/2015    Procedure: INSERT PORT VASCULAR ACCESS;  Surgeon: Abdifatah Staples MD;  Location: [facility]     LAPAROSCOPIC ASSISTED COLECTOMY Right 7/16/2015    Procedure: LAPAROSCOPIC ASSISTED COLECTOMY;  Surgeon: Abdifatah Staples MD;  Location:  OR     LAPAROSCOPIC OOPHORECTOMY      for benign growth     LUMPECTOMY BREAST      Right breast - Benign  1950's     LUMPECTOMY BREAST Right 7/29/2015    Procedure: LUMPECTOMY BREAST;  Surgeon: Abdifatah Staples MD;  Location:  [facility]     LUMPECTOMY BREAST, DISSECT AXILLARY NODE(S), COMBINED Right 7/16/2015    Procedure: COMBINED LUMPECTOMY BREAST, DISSECT AXILLARY NODE(S);  Surgeon: Abdifatah Staples MD;  Location:  OR     REMOVE PORT VASCULAR ACCESS N/A 8/29/2016    Procedure: REMOVE PORT VASCULAR ACCESS;  Surgeon: Abdifatah Staples MD;  Location: [facility]        Medications  Medication Sig     anastrozole (ARIMIDEX) 1 MG tablet Take 1 tablet (1 mg) by mouth daily     Calcium Carb-Cholecalciferol (CALCIUM + D3 PO) Take 1 tablet by mouth daily Dose 500 - 500 mg     clobetasol (TEMOVATE) 0.05 % cream Apply  "sparingly to affected area twice daily for 14 days.  Do not apply to face. (Patient not taking: Reported on 5/29/2019)     Multiple Vitamins-Minerals (MULTIVITAMIN & MINERAL PO) Take 1 tablet by mouth daily.       Allergies  Allergen Reactions     Azithromycin Muscle Pain (Myalgia)     Back and leg pains - only at night        Family History   Family history includes Breast Cancer (age of onset: 75) in her mother; Cerebrovascular Disease in her mother; Diabetes in her father.     Social History   . She reports that she has never smoked. She has never used smokeless tobacco. She reports that she drinks alcohol. She reports that she does not use drugs.    Review of Systems   Constitutional:  No fever, weight change or fatigue.    Eyes:     No dry eyes or vision changes.   Ears/Nose/Throat/Neck:  No oral ulcers, sore throat or voice change.    Cardiovascular:   No palpitations, syncope, angina or edema.   Respiratory:    No chest pain, excessive sleepiness, shortness of breath or hemoptysis.    Gastrointestinal:   No abdominal pain, nausea, vomiting, diarrhea or heartburn.    Genitourinary:   No dysuria, hematuria, urinary retention or urinary frequency.   Musculoskeletal:  No joint swelling or arthralgias.    Dermatologic:  No skin rash or other skin changes.   Neurologic:    No focal weakness or numbness. No neuropathy.   Psychiatric:    No depression, anxiety, suicidal ideation, or paranoid ideation.   Endocrine:   No cold or heat intolerance, polydipsia, hirsutism, change in libido, or flushing.   Hematology/Lymphatic:  No bleeding or lymphadenopathy.    Allergy/Immunology:  No rhinitis or hives.     Physical Exam   Vitals:  Blood pressure (!) 119/100, resp. rate 16, height 1.676 m (5' 6\"), weight 61.2 kg (135 lb), SpO2 95 %, not currently breastfeeding.    General:  Alert and oriented to person, place and time   Airway: Normal oropharyngeal airway and neck mobility   Lungs:  Clear " bilaterally   Heart:  Regular rate and rhythm   Abdomen: Soft, NT, ND, no masses   Rectal:  Perianal skin without excoriation, hemorrhoidal disease or anal fissure        Digital rectal examination reveals normal sphincter tone without masses    ASA Grade: II (mild systemic disease)    Impression: Cleared for use of conscious sedation for colorectal cancer surveillance    Plan: Proceed with colonoscopy     Courtney Melendez MD  [facility]  [phone]

## 2019-08-13 ENCOUNTER — HOSPITAL ENCOUNTER (OUTPATIENT)
Dept: LAB | Facility: CLINIC | Age: 84
End: 2019-08-13
Attending: INTERNAL MEDICINE
Payer: COMMERCIAL

## 2019-08-13 ENCOUNTER — HOSPITAL ENCOUNTER (OUTPATIENT)
Dept: CT IMAGING | Facility: CLINIC | Age: 84
Discharge: HOME | End: 2019-08-13
Attending: INTERNAL MEDICINE | Admitting: INTERNAL MEDICINE
Payer: COMMERCIAL

## 2019-08-13 DIAGNOSIS — C18.9 MALIGNANT NEOPLASM OF COLON, UNSPECIFIED PART OF COLON (H): ICD-10-CM

## 2019-08-13 LAB
ALBUMIN SERPL-MCNC: 3.9 G/DL (ref 3.4–5)
ALP SERPL-CCNC: 67 U/L (ref 40–150)
ALT SERPL W P-5'-P-CCNC: 27 U/L (ref 0–50)
AST SERPL W P-5'-P-CCNC: 20 U/L (ref 0–45)
BILIRUB DIRECT SERPL-MCNC: 0.1 MG/DL (ref 0–0.2)
BILIRUB SERPL-MCNC: 0.8 MG/DL (ref 0.2–1.3)
CEA SERPL-MCNC: 2 UG/L (ref 0–2.5)
COPATH REPORT: NORMAL
CREAT BLD-MCNC: 0.9 MG/DL (ref 0.52–1.04)
GFR SERPL CREATININE-BSD FRML MDRD: 60 ML/MIN/{1.73_M2}
HGB BLD-MCNC: 14.4 G/DL (ref 11.7–15.7)
PROT SERPL-MCNC: 7.9 G/DL (ref 6.8–8.8)

## 2019-08-13 PROCEDURE — 74177 CT ABD & PELVIS W/CONTRAST: CPT

## 2019-08-13 PROCEDURE — 82378 CARCINOEMBRYONIC ANTIGEN: CPT | Performed by: INTERNAL MEDICINE

## 2019-08-13 PROCEDURE — 85018 HEMOGLOBIN: CPT | Performed by: INTERNAL MEDICINE

## 2019-08-13 PROCEDURE — 82565 ASSAY OF CREATININE: CPT

## 2019-08-13 PROCEDURE — 80076 HEPATIC FUNCTION PANEL: CPT | Performed by: INTERNAL MEDICINE

## 2019-08-13 PROCEDURE — 36415 COLL VENOUS BLD VENIPUNCTURE: CPT | Performed by: INTERNAL MEDICINE

## 2019-08-13 PROCEDURE — 25000125 ZZHC RX 250: Performed by: INTERNAL MEDICINE

## 2019-08-13 PROCEDURE — 25000128 H RX IP 250 OP 636: Performed by: INTERNAL MEDICINE

## 2019-08-13 PROCEDURE — 71260 CT THORAX DX C+: CPT

## 2019-08-13 RX ORDER — IOPAMIDOL 755 MG/ML
66 INJECTION, SOLUTION INTRAVASCULAR ONCE
Status: COMPLETED | OUTPATIENT
Start: 2019-08-13 | End: 2019-08-13

## 2019-08-13 RX ORDER — IOPAMIDOL 755 MG/ML
68 INJECTION, SOLUTION INTRAVASCULAR ONCE
Status: DISCONTINUED | OUTPATIENT
Start: 2019-08-13 | End: 2019-08-13

## 2019-08-13 RX ADMIN — IOPAMIDOL 66 ML: 755 INJECTION, SOLUTION INTRAVENOUS at 08:43

## 2019-08-13 RX ADMIN — SODIUM CHLORIDE 59 ML: 9 INJECTION, SOLUTION INTRAVENOUS at 08:48

## 2019-08-19 ENCOUNTER — ONCOLOGY VISIT (OUTPATIENT)
Dept: ONCOLOGY | Facility: CLINIC | Age: 84
End: 2019-08-19
Attending: INTERNAL MEDICINE
Payer: COMMERCIAL

## 2019-08-19 VITALS
TEMPERATURE: 97.7 F | SYSTOLIC BLOOD PRESSURE: 115 MMHG | BODY MASS INDEX: 22.85 KG/M2 | OXYGEN SATURATION: 97 % | DIASTOLIC BLOOD PRESSURE: 64 MMHG | RESPIRATION RATE: 17 BRPM | WEIGHT: 141.6 LBS | HEART RATE: 65 BPM

## 2019-08-19 DIAGNOSIS — C18.9 MALIGNANT NEOPLASM OF COLON, UNSPECIFIED PART OF COLON (H): Primary | ICD-10-CM

## 2019-08-19 DIAGNOSIS — C50.211 MALIGNANT NEOPLASM OF UPPER-INNER QUADRANT OF RIGHT BREAST IN FEMALE, ESTROGEN RECEPTOR POSITIVE (H): ICD-10-CM

## 2019-08-19 DIAGNOSIS — Z17.0 MALIGNANT NEOPLASM OF UPPER-INNER QUADRANT OF RIGHT BREAST IN FEMALE, ESTROGEN RECEPTOR POSITIVE (H): ICD-10-CM

## 2019-08-19 PROCEDURE — G0463 HOSPITAL OUTPT CLINIC VISIT: HCPCS

## 2019-08-19 PROCEDURE — 99214 OFFICE O/P EST MOD 30 MIN: CPT | Performed by: INTERNAL MEDICINE

## 2019-08-19 RX ORDER — ANASTROZOLE 1 MG/1
1 TABLET ORAL DAILY
Qty: 90 TABLET | Refills: 3 | Status: SHIPPED | OUTPATIENT
Start: 2019-08-19 | End: 2020-09-28

## 2019-08-19 ASSESSMENT — PAIN SCALES - GENERAL: PAINLEVEL: NO PAIN (0)

## 2019-08-19 NOTE — PROGRESS NOTES
"Oncology Rooming Note    August 19, 2019 7:59 AM   Wanda Dockery is a 84 year old female who presents for:    Chief Complaint   Patient presents with     Oncology Clinic Visit     Initial Vitals: There were no vitals taken for this visit. Estimated body mass index is 21.79 kg/m  as calculated from the following:    Height as of 8/12/19: 1.676 m (5' 6\").    Weight as of 8/12/19: 61.2 kg (135 lb). There is no height or weight on file to calculate BSA.  Data Unavailable Comment: Data Unavailable   No LMP recorded. Patient is postmenopausal.  Allergies reviewed: Yes  Medications reviewed: Yes    Medications: MEDICATION REFILLS NEEDED TODAY. Provider was notified.  Pharmacy name entered into [facility]: [facility] [place], [place] - [address] [address]    Clinical concerns:  doctor was notified.    REFILLS ON ARIMIDEX    Mila Lindo CMA            "

## 2019-08-19 NOTE — PROGRESS NOTES
Visit Date:   08/19/2019     ONCOLOGIC HISTORY:  Wanda Dockery is a female with:  -Stage IIB (pT2 pN1a) right breast cancer. ER positive and HER-2/mario positive  -Stage II (pT2 pN0 M0) colon cancer.  1. Bilateral diagnostic mammograms and right breast ultrasound on 06/23/2015 revealed a 3 cm lesion at 1 o'clock position and right axillary lymphadenopathy.   -Ultrasound-guided biopsy of right breast and axillary lymph node on 06/29/2015 revealed grade 3 invasive ductal carcinoma, both in the breast and axillary lymph node. ER positive and HER-2/mario equivocal by FISH. IHC is 3+ in the right breast. In the right lymph node IHC is 2+.     2. Bone scan on 07/07/2015 did not reveal any bone metastasis.   -CT of the chest, abdomen and pelvis on 07/07/2015 revealed right breast lesion and 1.2 x 2 cm right axillary lymph node. There is thickening of the wall of the hepatitic flexure of the colon with associated inflammatory changes and multiple mildly prominent lymph nodes in the mesentery.   -Colonoscopy on 07/13/2015 revealed partially obstructing mass at the hepatic flexure and a sessile polyp in the cecum.  Hepatic flexure mass revealed poorly differentiated adenocarcinoma. 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 There is absence of expression of DNA mismatch repair protein MLH1 and PMS2.      3. Right breast lumpectomy, right lymph node dissection and right colectomy was done on 07/16/2015.    -Hemicolectomy specimen revealed high-grade adenocarcinoma measuring 7.5 cm extending into the santi-intestinal fatty tissue. Margins were negative. No lymphovascular invasion. Twenty-one benign lymph nodes. She has stage II (pT2 pN0 M0) colon cancer.   -Right breast reveals invasive ductal carcinoma, grade 2. It measures 2.1 cm. There was no DCIS. There was lymphovascular invasion present. Margins positive for invasive carcinoma. One of 7 lymph nodes positive. No extranodal extension. The patient has stage IIB (pT2 pN1a) breast cancer.    -Reexcision of lumpectomy site on 07/29/2015. There was no residual malignancy.      4. Taxol and Herceptin weekly x 12 between 08/17/2015 and 11/02/2015. One year of herceptin completed on 08/08/2016.  -Anastrazole started 11/30/2015.  -Radiation to breast cancer.  5040 cGy between 04/18/2016 and 06/15/2016.       5. DEXA scan on 04/25/2016 reveals osteopenia.  -DEXA scan on 08/09/2019 reveals worsening osteopenia.    6. Colonoscopy on 08/12/2019 does not reveal any malignancy.     SUBJECTIVE:  Mrs. Dockery is an 84-year-old female with right breast cancer and colon cancer as described above.  She is doing well.  She is asymptomatic.      No headache.  No dizziness.  No chest pain.  No difficulty breathing.  No abdominal pain, nausea or vomiting.  Appetite is good.  No urinary or bowel complaints.  No bleeding.  No fever, chills or night sweats.        CT chest, abdomen and pelvis on 08/13/2019 does not reveal any evidence of malignancy.      DEXA scan on 08/09/2019 reveals osteopenia; it is slightly worse.      PHYSICAL EXAMINATION:   GENERAL:  The patient was alert, oriented x3.   VITAL SIGNS:  Reviewed.  ECOG PS of 0.   The rest of the systems not examined.      ASSESSMENT:   1.  An 84-year-old female with stage IIB right breast cancer in 2015.  No evidence of recurrence.   2.  Stage II colon cancer in 2015.  No evidence of recurrence.   3.  Osteopenia.      PLAN:   1.  The patient is doing well.  No evidence of recurrence of malignancy.  Scans were all reviewed with her.  She was happy to know that.   2.  For breast cancer, she is on anastrozole.  She will continue on it.  She is tolerating it well.  She does not have any significant side effects.   3.  She has osteopenia. It is slightly worse.  She is on calcium and vitamin D.  Discussed regarding other treatment including bisphosphonate and Prolia.  She does not want them.   4.  She had a colonoscopy on 08/12/2019. No malignancy. Next will be in 5 years   5.   Advised her to follow up in 6 months' time.  Advised her to see a physician sooner if she has any lump, unexplained pain, weight loss, worsening weakness, shortness of breath, recurrent vomiting, bleeding or any other concerns.         WILFREDO COLON MD             D: 2019   T: 2019   MT: CECILE      Name:     ALTAF DEL ROSARIO   MRN:      [id number]        Account:      [id number]   :      1935           Visit Date:   2019      Document: [id number]

## 2019-08-19 NOTE — Clinical Note
"    8/19/2019         RE: Wanda Dockery  [address] [place] [address]        Dear Colleague,    Thank you for referring your patient, Wanda Dockery, to the [facility]. Please see a copy of my visit note below.    Oncology Rooming Note    August 19, 2019 7:59 AM   Wanda Dockery is a 84 year old female who presents for:    Chief Complaint   Patient presents with     Oncology Clinic Visit     Initial Vitals: There were no vitals taken for this visit. Estimated body mass index is 21.79 kg/m  as calculated from the following:    Height as of 8/12/19: 1.676 m (5' 6\").    Weight as of 8/12/19: 61.2 kg (135 lb). There is no height or weight on file to calculate BSA.  Data Unavailable Comment: Data Unavailable   No LMP recorded. Patient is postmenopausal.  Allergies reviewed: Yes  Medications reviewed: Yes    Medications: MEDICATION REFILLS NEEDED TODAY. Provider was notified.  Pharmacy name entered into [email or web address]: [facility] [place], [place] - [address] [facility]    Clinical concerns:  doctor was notified.    REFILLS ON ARIMIDEX    Mila Lindo, [facility]              Visit Date:   08/19/2019     ONCOLOGIC HISTORY:  Wanda Dockery is a female with:  -Stage IIB (pT2 pN1a) right breast cancer. ER positive and HER-2/mario positive  -Stage II (pT2 pN0 M0) colon cancer.  1. Bilateral diagnostic mammograms and right breast ultrasound on 06/23/2015 revealed a 3 cm lesion at 1 o'clock position and right axillary lymphadenopathy.   -Ultrasound-guided biopsy of right breast and axillary lymph node on 06/29/2015 revealed grade 3 invasive ductal carcinoma, both in the breast and axillary lymph node. ER positive and HER-2/mario equivocal by FISH. IHC is 3+ in the right breast. In the right lymph node IHC is 2+.     2. Bone scan on 07/07/2015 did not reveal any bone metastasis.   -CT of the chest, abdomen and pelvis on 07/07/2015 revealed right breast lesion and 1.2 x 2 cm right axillary lymph node. There is " thickening of the wall of the hepatitic flexure of the colon with associated inflammatory changes and multiple mildly prominent lymph nodes in the mesentery.   -Colonoscopy on 07/13/2015 revealed partially obstructing mass at the hepatic flexure and a sessile polyp in the cecum.  Hepatic flexure mass revealed poorly differentiated adenocarcinoma. 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 There is absence of expression of DNA mismatch repair protein MLH1 and PMS2.      3. Right breast lumpectomy, right lymph node dissection and right colectomy was done on 07/16/2015.    -Hemicolectomy specimen revealed high-grade adenocarcinoma measuring 7.5 cm extending into the santi-intestinal fatty tissue. Margins were negative. No lymphovascular invasion. Twenty-one benign lymph nodes. She has stage II (pT2 pN0 M0) colon cancer.   -Right breast reveals invasive ductal carcinoma, grade 2. It measures 2.1 cm. There was no DCIS. There was lymphovascular invasion present. Margins positive for invasive carcinoma. One of 7 lymph nodes positive. No extranodal extension. The patient has stage IIB (pT2 pN1a) breast cancer.   -Reexcision of lumpectomy site on 07/29/2015. There was no residual malignancy.      4. Taxol and Herceptin weekly x 12 between 08/17/2015 and 11/02/2015. One year of herceptin completed on 08/08/2016.  -Anastrazole started 11/30/2015.  -Radiation to breast cancer.  5040 cGy between 04/18/2016 and 06/15/2016.       5. DEXA scan on 04/25/2016 reveals osteopenia.  -DEXA scan on 08/09/2019 reveals worsening osteopenia.    6. Colonoscopy on 08/12/2019 does not reveal any malignancy.     SUBJECTIVE:  Mrs. Dockery is an 84-year-old female with right breast cancer and colon cancer as described above.  She is doing well.  She is asymptomatic.      No headache.  No dizziness.  No chest pain.  No difficulty breathing.  No abdominal pain, nausea or vomiting.  Appetite is good.  No urinary or bowel complaints.  No bleeding.  No fever,  chills or night sweats.        CT chest, abdomen and pelvis on 2019 does not reveal any evidence of malignancy.      DEXA scan on 2019 reveals osteopenia; it is slightly worse.      PHYSICAL EXAMINATION:   GENERAL:  The patient was alert, oriented x3.   VITAL SIGNS:  Reviewed.  ECOG PS of 0.   The rest of the systems not examined.      ASSESSMENT:   1.  An 84-year-old female with stage IIB right breast cancer in 2015.  No evidence of recurrence.   2.  Stage II colon cancer in 2015.  No evidence of recurrence.   3.  Osteopenia.      PLAN:   1.  The patient is doing well.  No evidence of recurrence of malignancy.  Scans were all reviewed with her.  She was happy to know that.   2.  For breast cancer, she is on anastrozole.  She will continue on it.  She is tolerating it well.  She does not have any significant side effects.   3.  She has osteopenia. It is slightly worse.  She is on calcium and vitamin D.  Discussed regarding other treatment including bisphosphonate and Prolia.  She does not want them.   4.  She had a colonoscopy on 2019. No malignancy. Next will be in 5 years   5.  Advised her to follow up in 6 months' time.  Advised her to see a physician sooner if she has any lump, unexplained pain, weight loss, worsening weakness, shortness of breath, recurrent vomiting, bleeding or any other concerns.         WILFREDO COLON MD             D: 2019   T: 2019   MT: CECILE      Name:     ALTAF DEL ROSARIO   MRN:      [id number]        Account:      [id number]   :      1935           Visit Date:   2019      Document: [id number]        Again, thank you for allowing me to participate in the care of your patient.        Sincerely,        Wilfredo Colon MD

## 2019-08-21 ENCOUNTER — NURSE TRIAGE (OUTPATIENT)
Dept: NURSING | Facility: CLINIC | Age: 84
End: 2019-08-21

## 2019-08-21 ENCOUNTER — HOSPITAL ENCOUNTER (EMERGENCY)
Facility: CLINIC | Age: 84
Discharge: HOME | End: 2019-08-21
Admitting: PHYSICIAN ASSISTANT
Payer: COMMERCIAL

## 2019-08-21 VITALS
TEMPERATURE: 98.1 F | DIASTOLIC BLOOD PRESSURE: 53 MMHG | HEART RATE: 88 BPM | HEIGHT: 66 IN | BODY MASS INDEX: 21.69 KG/M2 | OXYGEN SATURATION: 98 % | SYSTOLIC BLOOD PRESSURE: 123 MMHG | WEIGHT: 135 LBS | RESPIRATION RATE: 16 BRPM

## 2019-08-21 DIAGNOSIS — L03.90 CELLULITIS: ICD-10-CM
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
## 2019-08-21 PROCEDURE — 99282 EMERGENCY DEPT VISIT SF MDM: CPT

## 2019-08-21 RX ORDER — CEPHALEXIN 500 MG/1
500 CAPSULE ORAL 4 TIMES DAILY
Qty: 28 CAPSULE | Refills: 0 | Status: SHIPPED | OUTPATIENT
Start: 2019-08-21 | End: 2020-02-24

## 2019-08-21 ASSESSMENT — ENCOUNTER SYMPTOMS
NUMBNESS: 0
WOUND: 1
FEVER: 0
WEAKNESS: 0
JOINT SWELLING: 1
ARTHRALGIAS: 0

## 2019-08-21 ASSESSMENT — MIFFLIN-ST. JEOR: SCORE: 1084.11

## 2019-08-21 NOTE — ED AVS SNAPSHOT
Emergency Department  [address] [place] [id number]  Phone:  [phone]  Fax:  [phone]                                    Wanda Dockery   MRN: [id number]    Department:   Emergency Department   Date of Visit:  8/21/2019           After Visit Summary Signature Page    I have received my discharge instructions, and my questions have been answered. I have discussed any challenges I see with this plan with the nurse or doctor.    ..........................................................................................................................................  Patient/Patient Representative Signature      ..........................................................................................................................................  Patient Representative Print Name and Relationship to Patient    ..................................................               ................................................  Date                                   Time    ..........................................................................................................................................  Reviewed by Signature/Title    ...................................................              ..............................................  Date                                               Time          22EPIC Rev 08/18

## 2019-08-22 NOTE — ED TRIAGE NOTES
Pt had bug bite to left ankle about 10 days ago. Pt has increased swelling to left ankle. Pt has 2 bites to left ankle. Pt has an open wound to ankle.

## 2019-08-22 NOTE — ED PROVIDER NOTES
"  History   Chief Complaint:  Insect Bite    HPI   Wanda Dockery is a 84 year old female, with a history of breast cancer, colon cancer, and osteopenia, who presents to the ED for evaluation of an insect bite. The patient reports having insect bites for approximately 10 days, but have been worsening. She states the bites get red, itchy, and then will blister over. She states they are painful to the touch. The patient notes that beginning to walk hurts, but once she is moving the pain is gone. She states she has been using hydrocortisone cream and Bacitracin. The patient denies fever, numbness, weakness, arthralgia, or any other acute symptoms.  No diabetes history, she is on estrogen therapy for breast cancer history, there is no current evidence of recurrence or malignancy.    Allergies:  Azithromycin     Medications:    The patient is not currently taking any prescribed medications.    Past Medical History:    Breast cancer  Colon cancer  Hyperlipidemia  Osteopenia  Ovarian Tumor    Past Surgical History:    Cataract removal  Port placement  Colectomy  Oophorectomy  Right breast lumpectomy    Family History:    Diabetes  Cerebrovascular disease  Breast cancer    Social History:  Smoking status: Never  Alcohol use: Yes  Drug use: No  PCP: Ginette Lemus  Marital Status:   [2]    Review of Systems   Constitutional: Negative for fever.   Musculoskeletal: Positive for joint swelling. Negative for arthralgias.   Skin: Positive for wound.   Neurological: Negative for weakness and numbness.   All other systems reviewed and are negative.    Physical Exam     Patient Vitals for the past 24 hrs:   BP Temp Temp src Pulse Resp SpO2 Height Weight   08/21/19 2105 123/53 98.1  F (36.7  C) Oral 88 16 98 % 1.676 m (5' 6\") 61.2 kg (135 lb)     Physical Exam  General: Well appearing, well nourished. Normal mood and affect.  Skin: Insect bite to the lateral left malleolus.  Open weeping to the center of the wound.  Mild " surrounding erythema, edema, warmth.  No other central region of fluctuance.  A few scattered other insect bites seen throughout bilateral lower extremities with overlying scabs.  No surrounding erythema or warmth to these lesions.  HEENT: Head: Normocephalic, atraumatic, no visible masses.   Eyes: Conjunctiva clear.  Cardiac: Normal rate and regular rhythm, no murmur or gallop.   Lungs: Clear to auscultation.  Musculoskeletal: Normal gait and station.  Dorsiflexion plantarflexion intact bilaterally.  Normal sensation throughout bilateral lower extremities.  Normal dorsalis pedis and posterior tibialis pulses bilaterally.  Good capillary refill to all digits of bilateral feet.  Neurologic: Oriented x 3. GCS: 15.  Psychiatric: Intact recent and remote memory, judgment and insight, normal mood and affect.       Emergency Department Course   Emergency Department Course:  Past medical records, nursing notes, and vitals reviewed.  2123: I performed an exam of the patient and obtained history, as documented above.    Findings and plan explained to the Patient. Patient discharged home with instructions regarding supportive care, medications, and reasons to return. The importance of close follow-up was reviewed.      Impression & Plan    Medical Decision Making:  Wanda Dockery is a 84 year old female who presents for evaluation of a bug bite.  Details of the patient's history can be noted in the HPI.  Upon my exam, insect bite was weeping slightly, skin overlying appeared erythematous and was mildly warm to the touch.  I suspect early cellulitis surrounding poorly healing bug bite wound.  There are no signs of abscess formation, necrotizing fasciitis, osteomyelitis, sepsis, shock at this time.  Patient otherwise appears well and is vitally stable.  They were prescribed for antibiotic therapy.  They will follow-up with her primary care provider within the next 2-3 days for recheck.  Area around the cellulitis was outlined  with skin pen.  Return to the ED for further evaluation for worsening redness, increasing pain, fevers new concerns.  Elevation, pain control, other symptomatic cares discussed.  We also discussed wearing tall socks and pants when she is outside in her garden to avoid future insect bites.  All questions were answered prior to discharge.  Patient was in agreement with the treatment plan as stated above.    Diagnosis:    ICD-10-CM    1. Bug bite, initial encounter W57.XXXA    2. Cellulitis L03.90      Disposition:  Discharged to home.    Discharge Medications:  Discharge Medication List as of 8/21/2019  9:27 PM      START taking these medications    Details   cephALEXin (KEFLEX) 500 MG capsule Take 1 capsule (500 mg) by mouth 4 times daily for 7 days, Disp-28 capsule, R-0, Local Print           This was created at least in part with a voice recognition software. Mistakes/typos may be present.     Gerardo Roach  8/21/2019    EMERGENCY DEPARTMENT  Scribe Disclosure:  I, Gerardo Roach, am serving as a scribe at 9:23 PM on 8/21/2019 to document services personally performed by Kathy Hernandez based on my observations and the provider's statements to me.         Kathy Hernandez PA  08/21/19 [id number]

## 2019-08-22 NOTE — DISCHARGE INSTRUCTIONS
Begin taking the antibiotic as prescribed.  Cool compresses or ice to the ankle to help with swelling.  When going out in the garden, keep it covered with bacitracin and large Band-Aid.  Then wear tall socks and pants.  If redness and swelling is extending 4 to 48 hours, return for recheck.  Otherwise, try and have a provider after primary clinic recheck it on Friday.      Discharge Instructions  Cellulitis    Cellulitis is an infection of the skin that occurs when bacteria enter the skin.   Symptoms are generally redness, swelling, warmth and pain.  Your infection appeared to be appropriate to treat at home with antibiotics.  However, sometimes your infection may be worse than it seemed at first, or may worsen with time. If you have new or worse symptoms, you may need to be seen again in the Emergency Department or by your primary doctor.    Return to the Emergency Department if:  The redness, pain, or swelling gets a lot worse.  If the red area was marked, return if it is red beyond the marked area.  You are unable to get your antibiotics, or are vomiting them up or you can t take them.  You are feeling more ill, weak or lightheaded.  You start to run a new fever (temperature >101).  Anything else about the infection worries or concerns you.  Treatment:  Start your antibiotics right away, and take them as prescribed. Be sure to finish the whole prescription, even if you are better.  Apply a heating pad, warm packs, or warm water soaks to the infected area for 15 minutes at a time, at least 3 times a day. Do not use a heating pad on your feet or legs if you have diabetes. Do not sleep with a heating pad on, since this can cause burns or skin injury.  Rest your injured area for at least 1-2 days. After that you may start using your extremity again as long as there is not too much pain.   Raise the injured area above the level of your heart as much as possible in the first 1-2 days.  Tylenol  (acetaminophen), Motrin   "(ibuprofen), or Advil  (ibuprofen) may help may help reduce pain and fever and may help you feel more comfortable. Be sure to read and follow the package directions, and ask your doctor if you have questions.    Follow-up with your doctor:  Re-check in clinic within 2-3 days.  Probiotics: If you have been given an antibiotic, you may want to also take a probiotic pill or eat yogurt with live cultures. Probiotics have \"good bacteria\" to help your intestines stay healthy. Studies have shown that probiotics help prevent diarrhea and other intestine problems (including C. diff infection) when you take antibiotics. You can buy these without a prescription in the pharmacy section of the store.     If you were given a prescription for medicine here today, be sure to read all of the information (including the package insert) that comes with your prescription.  This will include important information about the medicine, its side effects, and any warnings that you need to know about.  The pharmacist who fills the prescription can provide more information and answer questions you may have about the medicine.  If you have questions or concerns that the pharmacist cannot address, please call or return to the Emergency Department.     Opioid Medication Information    Pain medications are among the most commonly prescribed medicines, so we are including this information for all our patients. If you did not receive pain medication or get a prescription for pain medicine, you can ignore it.     You may have been given a prescription for an opioid (narcotic) pain medicine and/or have received a pain medicine while here in the Emergency Department. These medicines can make you drowsy or impaired. You must not drive, operate dangerous equipment, or engage in any other dangerous activities while taking these medications. If you drive while taking these medications, you could be arrested for DUI, or driving under the influence. Do not " drink any alcohol while you are taking these medications.     Opioid pain medications can cause addiction. If you have a history of chemical dependency of any type, you are at a higher risk of becoming addicted to pain medications.  Only take these prescribed medications to treat your pain when all other options have been tried. Take it for as short a time and as few doses as possible. Store your pain pills in a secure place, as they are frequently stolen and provide a dangerous opportunity for children or visitors in your house to start abusing these powerful medications. We will not replace any lost or stolen medicine.  As soon as your pain is better, you should flush all your remaining medication.     Many prescription pain medications contain Tylenol  (acetaminophen), including Vicodin , Tylenol #3 , Norco , Lortab , and Percocet .  You should not take any extra pills of Tylenol  if you are using these prescription medications or you can get very sick.  Do not ever take more than 3000 mg of acetaminophen in any 24 hour period.    All opioids tend to cause constipation. Drink plenty of water and eat foods that have a lot of fiber, such as fruits, vegetables, prune juice, apple juice and high fiber cereal.  Take a laxative if you don t move your bowels at least every other day. Miralax , Milk of Magnesia, Colace , or Senna  can be used to keep you regular.      Remember that you can always come back to the Emergency Department if you are not able to see your regular doctor in the amount of time listed above, if you get any new symptoms, or if there is anything that worries you.

## 2019-08-22 NOTE — TELEPHONE ENCOUNTER
Pt calls in with concern of insect bite to both ankles - is in garden a lot    Now Right ankle looks swollen - redden - pain 2/10   Denies fever or itching     Bites happened 1 week ago   Denies any red streaking     Because this has now been going on for over 1 week now advised to have bite evaluated by a Provider within next day or so     Transferred to scheduling - unable to get into PMD  So will go to Urgent Care tomorrow to have a Provider take a look at bite and make sure not infected     Protocol and care advice reviewed    Caller states understanding of the recommended disposition    Advised to call back if further questions or concerns    Eddie Murphy , RN / [facility]    Additional Information    Negative: [1] Life-threatening reaction (anaphylaxis) in the past to same insect bite AND [2] < 2 hours since bite    Negative: Passed out (i.e., lost consciousness, collapsed and was not responding)    Negative: Difficulty breathing or wheezing    Negative: [1] Hoarseness or cough AND [2] sudden onset following bite    Negative: [1] Difficulty swallowing or slurred speech AND [2] sudden onset following bite    Negative: Sounds like a life-threatening emergency to the triager    Negative: Patient sounds very sick or weak to the triager    Negative: [1] SEVERE bite pain AND [2] not improved after 2 hours of pain medicine    Negative: [1] Fever AND [2] red area    Negative: [1] Fever AND [2] area is very tender to touch    Negative: [1] Red streak or red line AND [2] length > 2 inches (5 cm)    Negative: [1] Scab is present AND [2] it drains pus or increases in size AND [3] not improved after applying antibiotic ointment for 2 days    [1] Scab is present AND [2] it drains pus or increases in size    Negative: [1] SEVERE local itching (i.e., interferes with work, school, sleep) AND [2] not improved after 24 hours of hydrocortisone cream    Negative: [1] After 14 days AND [2] insect bite isn't  healed    Negative: Bite starts to look bad (e.g., blister, purplish skin, ulcer) (Exception:  just swelling or small red bump)    Negative: [1] Red or very tender (to touch) area AND [2] started over 24 hours after the bite    Negative: [1] Red or very tender (to touch) area AND [2] getting larger over 48 hours after the bite    Protocols used: INSECT BITE-A-AH

## 2019-11-05 ENCOUNTER — PATIENT OUTREACH (OUTPATIENT)
Dept: ONCOLOGY | Facility: CLINIC | Age: 84
End: 2019-11-05

## 2019-11-05 NOTE — TELEPHONE ENCOUNTER
Patient called with a concern of mole that has changed color and texture wanting to see if Dr. Rodas should look at the mole. Patient informed me that she has a dermatology appointment on 11/20 with Dr. De Anda where they will do a biopsy. I provided our fax # to have all reports faxed to us.    I will update Dr. Rodas and call patient with any further instructions.    Claudine West RN

## 2020-02-04 ENCOUNTER — TRANSFERRED RECORDS (OUTPATIENT)
Dept: HEALTH INFORMATION MANAGEMENT | Facility: CLINIC | Age: 85
End: 2020-02-04

## 2020-02-21 ENCOUNTER — INFUSION THERAPY VISIT (OUTPATIENT)
Dept: INFUSION THERAPY | Facility: CLINIC | Age: 85
End: 2020-02-21
Attending: INTERNAL MEDICINE
Payer: COMMERCIAL

## 2020-02-21 ENCOUNTER — HOSPITAL ENCOUNTER (OUTPATIENT)
Facility: CLINIC | Age: 85
Setting detail: SPECIMEN
Discharge: HOME | End: 2020-02-21
Attending: INTERNAL MEDICINE | Admitting: INTERNAL MEDICINE
Payer: COMMERCIAL

## 2020-02-21 DIAGNOSIS — C18.9 MALIGNANT NEOPLASM OF COLON, UNSPECIFIED PART OF COLON (H): ICD-10-CM

## 2020-02-21 LAB
ALBUMIN SERPL-MCNC: 4 G/DL (ref 3.4–5)
ALP SERPL-CCNC: 69 U/L (ref 40–150)
ALT SERPL W P-5'-P-CCNC: 37 U/L (ref 0–50)
AST SERPL W P-5'-P-CCNC: 27 U/L (ref 0–45)
BILIRUB DIRECT SERPL-MCNC: 0.1 MG/DL (ref 0–0.2)
BILIRUB SERPL-MCNC: 0.6 MG/DL (ref 0.2–1.3)
CEA SERPL-MCNC: 1.4 UG/L (ref 0–2.5)
ERYTHROCYTE [DISTWIDTH] IN BLOOD BY AUTOMATED COUNT: 13.3 % (ref 10–15)
HCT VFR BLD AUTO: 39.4 % (ref 35–47)
HGB BLD-MCNC: 13.7 G/DL (ref 11.7–15.7)
MCH RBC QN AUTO: 32 PG (ref 26.5–33)
MCHC RBC AUTO-ENTMCNC: 34.8 G/DL (ref 31.5–36.5)
MCV RBC AUTO: 92 FL (ref 78–100)
PLATELET # BLD AUTO: 272 10E9/L (ref 150–450)
PROT SERPL-MCNC: 7.7 G/DL (ref 6.8–8.8)
RBC # BLD AUTO: 4.28 10E12/L (ref 3.8–5.2)
WBC # BLD AUTO: 4.8 10E9/L (ref 4–11)

## 2020-02-21 PROCEDURE — 85027 COMPLETE CBC AUTOMATED: CPT | Performed by: INTERNAL MEDICINE

## 2020-02-21 PROCEDURE — 36415 COLL VENOUS BLD VENIPUNCTURE: CPT

## 2020-02-21 PROCEDURE — 82378 CARCINOEMBRYONIC ANTIGEN: CPT | Performed by: INTERNAL MEDICINE

## 2020-02-21 PROCEDURE — 80076 HEPATIC FUNCTION PANEL: CPT | Performed by: INTERNAL MEDICINE

## 2020-02-21 NOTE — PROGRESS NOTES
Medical Assistant Note:  Wanda Dockery presents today for blood draw.    Patient seen by provider today: No.   present during visit today: Not Applicable.    Concerns: No Concerns.    Procedure:  Lab draw site: LAC, Needle type: BF, Gauge: 23g with gauze and coban.    Post Assessment:  Labs drawn without difficulty: Yes.    Discharge Plan:  Departure Mode: Ambulatory.    Face to Face Time: 5.    Sherin Kemp, CMA

## 2020-02-24 ENCOUNTER — ONCOLOGY VISIT (OUTPATIENT)
Dept: ONCOLOGY | Facility: CLINIC | Age: 85
End: 2020-02-24
Attending: INTERNAL MEDICINE
Payer: COMMERCIAL

## 2020-02-24 VITALS
SYSTOLIC BLOOD PRESSURE: 126 MMHG | RESPIRATION RATE: 16 BRPM | DIASTOLIC BLOOD PRESSURE: 76 MMHG | HEART RATE: 72 BPM | OXYGEN SATURATION: 98 % | HEIGHT: 66 IN | TEMPERATURE: 98.2 F | BODY MASS INDEX: 23.85 KG/M2 | WEIGHT: 148.4 LBS

## 2020-02-24 DIAGNOSIS — C50.211 MALIGNANT NEOPLASM OF UPPER-INNER QUADRANT OF RIGHT BREAST IN FEMALE, ESTROGEN RECEPTOR POSITIVE (H): ICD-10-CM

## 2020-02-24 DIAGNOSIS — Z17.0 MALIGNANT NEOPLASM OF UPPER-INNER QUADRANT OF RIGHT BREAST IN FEMALE, ESTROGEN RECEPTOR POSITIVE (H): ICD-10-CM

## 2020-02-24 DIAGNOSIS — C18.9 MALIGNANT NEOPLASM OF COLON, UNSPECIFIED PART OF COLON (H): Primary | ICD-10-CM

## 2020-02-24 PROCEDURE — G0463 HOSPITAL OUTPT CLINIC VISIT: HCPCS

## 2020-02-24 PROCEDURE — 99214 OFFICE O/P EST MOD 30 MIN: CPT | Performed by: INTERNAL MEDICINE

## 2020-02-24 ASSESSMENT — MIFFLIN-ST. JEOR: SCORE: 1144.89

## 2020-02-24 ASSESSMENT — PAIN SCALES - GENERAL: PAINLEVEL: NO PAIN (0)

## 2020-02-24 NOTE — PROGRESS NOTES
"Oncology Rooming Note    February 24, 2020 8:17 AM   Wanda Dockery is a 84 year old female who presents for:    Chief Complaint   Patient presents with     Oncology Clinic Visit     Initial Vitals: /76   Pulse 72   Temp 98.2  F (36.8  C) (Oral)   Resp 16   Ht 1.676 m (5' 6\")   Wt 67.3 kg (148 lb 6.4 oz)   SpO2 98%   BMI 23.95 kg/m   Estimated body mass index is 23.95 kg/m  as calculated from the following:    Height as of this encounter: 1.676 m (5' 6\").    Weight as of this encounter: 67.3 kg (148 lb 6.4 oz). Body surface area is 1.77 meters squared.  No Pain (0) Comment: Data Unavailable   No LMP recorded. Patient is postmenopausal.  Allergies reviewed: Yes  Medications reviewed: Yes    Medications: Medication refills not needed today.  Pharmacy name entered into [email or web address]: [facility] [place], [place] - [address] [id number]    Clinical concerns: no      Sherin Kemp CMA            "

## 2020-02-24 NOTE — LETTER
"    2/24/2020         RE: Wanda Dockery  [address] [place] [address]        Dear Colleague,    Thank you for referring your patient, Wanda Dockery, to the [facility]. Please see a copy of my visit note below.    Oncology Rooming Note    February 24, 2020 8:17 AM   Wanda Dockery is a 84 year old female who presents for:    Chief Complaint   Patient presents with     Oncology Clinic Visit     Initial Vitals: /76   Pulse 72   Temp 98.2  F (36.8  C) (Oral)   Resp 16   Ht 1.676 m (5' 6\")   Wt 67.3 kg (148 lb 6.4 oz)   SpO2 98%   BMI 23.95 kg/m    Estimated body mass index is 23.95 kg/m  as calculated from the following:    Height as of this encounter: 1.676 m (5' 6\").    Weight as of this encounter: 67.3 kg (148 lb 6.4 oz). Body surface area is 1.77 meters squared.  No Pain (0) Comment: Data Unavailable   No LMP recorded. Patient is postmenopausal.  Allergies reviewed: Yes  Medications reviewed: Yes    Medications: Medication refills not needed today.  Pharmacy name entered into [employer]: [facility] [place], [place] - [address] [facility]    Clinical concerns: no      Sherin Kemp [facility]              Visit Date:   02/24/2020      ONCOLOGIC HISTORY:  Wanda Dockery is a female with:  -Stage IIB (pT2 pN1a) right breast cancer. ER positive and HER-2/mario positive  -Stage II (pT2 pN0 M0) colon cancer.    1. Bilateral diagnostic mammograms and right breast ultrasound on 06/23/2015 revealed a 3 cm lesion at 1 o'clock position and right axillary lymphadenopathy.   -Ultrasound-guided biopsy of right breast and axillary lymph node on 06/29/2015 revealed grade 3 invasive ductal carcinoma, both in the breast and axillary lymph node. ER positive and HER-2/mario equivocal by FISH. IHC is 3+ in the right breast. In the right lymph node IHC is 2+.     2. Bone scan on 07/07/2015 did not reveal any bone metastasis.   -CT of the chest, abdomen and pelvis on 07/07/2015 revealed right breast " lesion and 1.2 x 2 cm right axillary lymph node. There is thickening of the wall of the hepatitic flexure of the colon with associated inflammatory changes and multiple mildly prominent lymph nodes in the mesentery.   -Colonoscopy on 07/13/2015 revealed partially obstructing mass at the hepatic flexure and a sessile polyp in the cecum.  Hepatic flexure mass revealed poorly differentiated adenocarcinoma. 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 There is absence of expression of DNA mismatch repair protein MLH1 and PMS2.      3. Right breast lumpectomy, right lymph node dissection and right colectomy was done on 07/16/2015.    -Hemicolectomy specimen revealed high-grade adenocarcinoma measuring 7.5 cm extending into the santi-intestinal fatty tissue. Margins were negative. No lymphovascular invasion. Twenty-one benign lymph nodes. She has stage II (pT2 pN0 M0) colon cancer.   -Right breast reveals invasive ductal carcinoma, grade 2. It measures 2.1 cm. There was no DCIS. There was lymphovascular invasion present. Margins positive for invasive carcinoma. One of 7 lymph nodes positive. No extranodal extension. The patient has stage IIB (pT2 pN1a) breast cancer.   -Reexcision of lumpectomy site on 07/29/2015. There was no residual malignancy.      4. Taxol and Herceptin weekly x 12 between 08/17/2015 and 11/02/2015. One year of herceptin completed on 08/08/2016.  -Anastrazole started 11/30/2015.  -Radiation to breast cancer.  5040 cGy between 04/18/2016 and 06/15/2016.       5. DEXA scan on 04/25/2016 reveals osteopenia.  -DEXA scan on 08/09/2019 reveals worsening osteopenia.     6. Colonoscopy on 08/12/2019 does not reveal any malignancy.     SUBJECTIVE:  Ms. Dockery is an 84-year-old female with stage II right breast cancer and stage II colon cancer.  This was diagnosed in 2015.  She is doing well.  For breast cancer, she is on anastrozole.      REVIEW OF SYSTEMS:  She has no complaints or concerns.  No headache.  No  dizziness.  No ear pain.  No sore throat.  No neck pain.  No chest pain.  No shortness of breath.  No abdominal pain.  No nausea or vomiting.  Appetite is good.  No  urinary or bowel complaints.  No bleeding.  No fever, chills or night sweats.      PHYSICAL EXAMINATION:   Alert and oriented x 3.   VITAL SIGNS: Reviewed. ECOG PS of 1.  EYES: No icterus.   THROAT: No ulcer or thrush.   NECK: Supple. No lymphadenopathy or thyromegaly.   AXILLAE: No lymphadenopathy.   LUNGS: Good air entry bilaterally. No crackles or wheezing.   HEART: Regular. No murmur.   GI: Abdomen is soft. Nontender. No mass.   EXTREMITIES: No edema. No calf swelling or tenderness.   SKIN: No petechiae.  BREASTS:  Examined in the presence of the nurse.  Both breast exams are normal.  No suspicious mass, nodule or skin changes.      LABS:  Reviewed.      ASSESSMENT:   1.  An 84-year-old female with stage II right breast cancer.  No evidence of recurrence.   2.  Stage II colon cancer.  No evidence of recurrence   3.  Osteopenia.      PLAN:   1.  The patient is doing very well from breast cancer.  No evidence of recurrence.  She will continue on anastrozole.  She is tolerating it well.   2.  Discussed regarding colon cancer.  No evidence of recurrence.  For followup, we will get a CT of the chest, abdomen and pelvis and labs including a CEA in 6 months.   3.  She has osteopenia.  She does not want bisphosphonate or Prolia.  She will continue on calcium and vitamin D.   4.  I will see her in 6 months' time.  I advised her to return sooner if she has any lump, unexplained pain, weight loss, worsening weakness, shortness of breath or any other concerns.         WILFREDO COLON MD             D: 2020   T: 2020   MT: YOAN      Name:     WANDA DEL ROSARIO   MRN:      [id number]        Account:      [id number]   :      1935           Visit Date:   2020      Document: [id number]      Visit Date:   2020     ONCOLOGIC HISTORY:  Wanda  Sarkis is a female with:  -Stage IIB (pT2 pN1a) right breast cancer. ER positive and HER-2/mario positive  -Stage II (pT2 pN0 M0) colon cancer.    1. Bilateral diagnostic mammograms and right breast ultrasound on 06/23/2015 revealed a 3 cm lesion at 1 o'clock position and right axillary lymphadenopathy.   -Ultrasound-guided biopsy of right breast and axillary lymph node on 06/29/2015 revealed grade 3 invasive ductal carcinoma, both in the breast and axillary lymph node. ER positive and HER-2/mario equivocal by FISH. IHC is 3+ in the right breast. In the right lymph node IHC is 2+.     2. Bone scan on 07/07/2015 did not reveal any bone metastasis.   -CT of the chest, abdomen and pelvis on 07/07/2015 revealed right breast lesion and 1.2 x 2 cm right axillary lymph node. There is thickening of the wall of the hepatitic flexure of the colon with associated inflammatory changes and multiple mildly prominent lymph nodes in the mesentery.   -Colonoscopy on 07/13/2015 revealed partially obstructing mass at the hepatic flexure and a sessile polyp in the cecum.  Hepatic flexure mass revealed poorly differentiated adenocarcinoma. 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 There is absence of expression of DNA mismatch repair protein MLH1 and PMS2.      3. Right breast lumpectomy, right lymph node dissection and right colectomy was done on 07/16/2015.    -Hemicolectomy specimen revealed high-grade adenocarcinoma measuring 7.5 cm extending into the santi-intestinal fatty tissue. Margins were negative. No lymphovascular invasion. Twenty-one benign lymph nodes. She has stage II (pT2 pN0 M0) colon cancer.   -Right breast reveals invasive ductal carcinoma, grade 2. It measures 2.1 cm. There was no DCIS. There was lymphovascular invasion present. Margins positive for invasive carcinoma. One of 7 lymph nodes positive. No extranodal extension. The patient has stage IIB (pT2 pN1a) breast cancer.   -Reexcision of lumpectomy site on 07/29/2015.  There was no residual malignancy.      4. Taxol and Herceptin weekly x 12 between 08/17/2015 and 11/02/2015. One year of herceptin completed on 08/08/2016.  -Anastrazole started 11/30/2015.  -Radiation to breast cancer.  5040 cGy between 04/18/2016 and 06/15/2016.       5. DEXA scan on 04/25/2016 reveals osteopenia.  -DEXA scan on 08/09/2019 reveals worsening osteopenia.     6. Colonoscopy on 08/12/2019 does not reveal any malignancy.     SUBJECTIVE:  Ms. Dockery is an 84-year-old female with stage II right breast cancer and stage II colon cancer.  This was diagnosed in 2015.  She is doing well.  For breast cancer, she is on anastrozole.      REVIEW OF SYSTEMS:  She has no complaints or concerns.  No headache.  No dizziness.  No ear pain.  No sore throat.  No neck pain.  No chest pain.  No shortness of breath.  No abdominal pain.  No nausea or vomiting.  Appetite is good.  No urinary or bowel complaints.  No bleeding.  No fever, chills or night sweats.      PHYSICAL EXAMINATION:   Alert and oriented x 3.   VITAL SIGNS: Reviewed. ECOG PS of 1.  EYES: No icterus.   THROAT: No ulcer or thrush.   NECK: Supple. No lymphadenopathy or thyromegaly.   AXILLAE: No lymphadenopathy.   LUNGS: Good air entry bilaterally. No crackles or wheezing.   HEART: Regular. No murmur.   GI: Abdomen is soft. Nontender. No mass.   EXTREMITIES: No edema. No calf swelling or tenderness.   SKIN: No petechiae.  BREASTS:  Examined in the presence of the nurse.  Both breast exams are normal.  No suspicious mass, nodule or skin changes.      LABS:  Reviewed.      ASSESSMENT:   1.  An 84-year-old female with stage II right breast cancer.  No evidence of recurrence.   2.  Stage II colon cancer.  No evidence of recurrence   3.  Osteopenia.      PLAN:   1.  The patient is doing very well from breast cancer.  No evidence of recurrence.  She will continue on anastrozole.  She is tolerating it well.   2.  Discussed regarding colon cancer.  No evidence of  recurrence.  For followup, we will get a CT of the chest, abdomen and pelvis and labs including a CEA in 6 months.   3.  She has osteopenia.  She does not want bisphosphonate or Prolia.  She will continue on calcium and vitamin D.   4.  I will see her in 6 months' time.  I advised her to return sooner if she has any lump, unexplained pain, weight loss, worsening weakness, shortness of breath or any other concerns.         WILFREDO COLON MD             D: 2020   T: 2020   MT: YOAN      Name:     ALTAF DEL ROSARIO   MRN:      [id number]        Account:      [id number]   :      1935           Visit Date:   2020      Document: [id number]          Again, thank you for allowing me to participate in the care of your patient.        Sincerely,        Wilfredo Colon MD

## 2020-02-24 NOTE — PROGRESS NOTES
Visit Date:   02/24/2020     ONCOLOGIC HISTORY:  Wanda Dockery is a female with:  -Stage IIB (pT2 pN1a) right breast cancer. ER positive and HER-2/mario positive  -Stage II (pT2 pN0 M0) colon cancer.    1. Bilateral diagnostic mammograms and right breast ultrasound on 06/23/2015 revealed a 3 cm lesion at 1 o'clock position and right axillary lymphadenopathy.   -Ultrasound-guided biopsy of right breast and axillary lymph node on 06/29/2015 revealed grade 3 invasive ductal carcinoma, both in the breast and axillary lymph node. ER positive and HER-2/mario equivocal by FISH. IHC is 3+ in the right breast. In the right lymph node IHC is 2+.     2. Bone scan on 07/07/2015 did not reveal any bone metastasis.   -CT of the chest, abdomen and pelvis on 07/07/2015 revealed right breast lesion and 1.2 x 2 cm right axillary lymph node. There is thickening of the wall of the hepatitic flexure of the colon with associated inflammatory changes and multiple mildly prominent lymph nodes in the mesentery.   -Colonoscopy on 07/13/2015 revealed partially obstructing mass at the hepatic flexure and a sessile polyp in the cecum.  Hepatic flexure mass revealed poorly differentiated adenocarcinoma. 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 There is absence of expression of DNA mismatch repair protein MLH1 and PMS2.      3. Right breast lumpectomy, right lymph node dissection and right colectomy was done on 07/16/2015.    -Hemicolectomy specimen revealed high-grade adenocarcinoma measuring 7.5 cm extending into the santi-intestinal fatty tissue. Margins were negative. No lymphovascular invasion. Twenty-one benign lymph nodes. She has stage II (pT2 pN0 M0) colon cancer.   -Right breast reveals invasive ductal carcinoma, grade 2. It measures 2.1 cm. There was no DCIS. There was lymphovascular invasion present. Margins positive for invasive carcinoma. One of 7 lymph nodes positive. No extranodal extension. The patient has stage IIB (pT2 pN1a) breast  cancer.   -Reexcision of lumpectomy site on 07/29/2015. There was no residual malignancy.      4. Taxol and Herceptin weekly x 12 between 08/17/2015 and 11/02/2015. One year of herceptin completed on 08/08/2016.  -Anastrazole started 11/30/2015.  -Radiation to breast cancer.  5040 cGy between 04/18/2016 and 06/15/2016.       5. DEXA scan on 04/25/2016 reveals osteopenia.  -DEXA scan on 08/09/2019 reveals worsening osteopenia.     6. Colonoscopy on 08/12/2019 does not reveal any malignancy.     SUBJECTIVE:  Ms. Dockery is an 84-year-old female with stage II right breast cancer and stage II colon cancer.  This was diagnosed in 2015.  She is doing well.  For breast cancer, she is on anastrozole.      REVIEW OF SYSTEMS:  She has no complaints or concerns.  No headache.  No dizziness.  No ear pain.  No sore throat.  No neck pain.  No chest pain.  No shortness of breath.  No abdominal pain.  No nausea or vomiting.  Appetite is good.  No urinary or bowel complaints.  No bleeding.  No fever, chills or night sweats.      PHYSICAL EXAMINATION:   Alert and oriented x 3.   VITAL SIGNS: Reviewed. ECOG PS of 1.  EYES: No icterus.   THROAT: No ulcer or thrush.   NECK: Supple. No lymphadenopathy or thyromegaly.   AXILLAE: No lymphadenopathy.   LUNGS: Good air entry bilaterally. No crackles or wheezing.   HEART: Regular. No murmur.   GI: Abdomen is soft. Nontender. No mass.   EXTREMITIES: No edema. No calf swelling or tenderness.   SKIN: No petechiae.  BREASTS:  Examined in the presence of the nurse.  Both breast exams are normal.  No suspicious mass, nodule or skin changes.      LABS:  Reviewed.      ASSESSMENT:   1.  An 84-year-old female with stage II right breast cancer.  No evidence of recurrence.   2.  Stage II colon cancer.  No evidence of recurrence   3.  Osteopenia.      PLAN:   1.  The patient is doing very well from breast cancer.  No evidence of recurrence.  She will continue on anastrozole.  She is tolerating it well.   2.   Discussed regarding colon cancer.  No evidence of recurrence.  For followup, we will get a CT of the chest, abdomen and pelvis and labs including a CEA in 6 months.   3.  She has osteopenia.  She does not want bisphosphonate or Prolia.  She will continue on calcium and vitamin D.   4.  I will see her in 6 months' time.  I advised her to return sooner if she has any lump, unexplained pain, weight loss, worsening weakness, shortness of breath or any other concerns.         WILFREDO COLON MD             D: 2020   T: 2020   MT: YOAN      Name:     ALTAF DEL ROSARIO   MRN:      [id number]        Account:      [id number]   :      1935           Visit Date:   2020      Document: [id number]

## 2020-02-29 NOTE — PROGRESS NOTES
Visit Date:   02/24/2020     ONCOLOGIC HISTORY:  Wanda Dockery is a female with:  -Stage IIB (pT2 pN1a) right breast cancer. ER positive and HER-2/mario positive  -Stage II (pT2 pN0 M0) colon cancer.    1. Bilateral diagnostic mammograms and right breast ultrasound on 06/23/2015 revealed a 3 cm lesion at 1 o'clock position and right axillary lymphadenopathy.   -Ultrasound-guided biopsy of right breast and axillary lymph node on 06/29/2015 revealed grade 3 invasive ductal carcinoma, both in the breast and axillary lymph node. ER positive and HER-2/mario equivocal by FISH. IHC is 3+ in the right breast. In the right lymph node IHC is 2+.     2. Bone scan on 07/07/2015 did not reveal any bone metastasis.   -CT of the chest, abdomen and pelvis on 07/07/2015 revealed right breast lesion and 1.2 x 2 cm right axillary lymph node. There is thickening of the wall of the hepatitic flexure of the colon with associated inflammatory changes and multiple mildly prominent lymph nodes in the mesentery.   -Colonoscopy on 07/13/2015 revealed partially obstructing mass at the hepatic flexure and a sessile polyp in the cecum.  Hepatic flexure mass revealed poorly differentiated adenocarcinoma. 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 There is absence of expression of DNA mismatch repair protein MLH1 and PMS2.      3. Right breast lumpectomy, right lymph node dissection and right colectomy was done on 07/16/2015.    -Hemicolectomy specimen revealed high-grade adenocarcinoma measuring 7.5 cm extending into the santi-intestinal fatty tissue. Margins were negative. No lymphovascular invasion. Twenty-one benign lymph nodes. She has stage II (pT2 pN0 M0) colon cancer.   -Right breast reveals invasive ductal carcinoma, grade 2. It measures 2.1 cm. There was no DCIS. There was lymphovascular invasion present. Margins positive for invasive carcinoma. One of 7 lymph nodes positive. No extranodal extension. The patient has stage IIB (pT2 pN1a) breast  cancer.   -Reexcision of lumpectomy site on 07/29/2015. There was no residual malignancy.      4. Taxol and Herceptin weekly x 12 between 08/17/2015 and 11/02/2015. One year of herceptin completed on 08/08/2016.  -Anastrazole started 11/30/2015.  -Radiation to breast cancer.  5040 cGy between 04/18/2016 and 06/15/2016.       5. DEXA scan on 04/25/2016 reveals osteopenia.  -DEXA scan on 08/09/2019 reveals worsening osteopenia.     6. Colonoscopy on 08/12/2019 does not reveal any malignancy.     SUBJECTIVE:  Ms. Dockery is an 84-year-old female with stage II right breast cancer and stage II colon cancer.  This was diagnosed in 2015.  She is doing well.  For breast cancer, she is on anastrozole.      REVIEW OF SYSTEMS:  She has no complaints or concerns.  No headache.  No dizziness.  No ear pain.  No sore throat.  No neck pain.  No chest pain.  No shortness of breath.  No abdominal pain.  No nausea or vomiting.  Appetite is good.  No urinary or bowel complaints.  No bleeding.  No fever, chills or night sweats.      PHYSICAL EXAMINATION:   Alert and oriented x 3.   VITAL SIGNS: Reviewed. ECOG PS of 1.  EYES: No icterus.   THROAT: No ulcer or thrush.   NECK: Supple. No lymphadenopathy or thyromegaly.   AXILLAE: No lymphadenopathy.   LUNGS: Good air entry bilaterally. No crackles or wheezing.   HEART: Regular. No murmur.   GI: Abdomen is soft. Nontender. No mass.   EXTREMITIES: No edema. No calf swelling or tenderness.   SKIN: No petechiae.  BREASTS:  Examined in the presence of the nurse.  Both breast exams are normal.  No suspicious mass, nodule or skin changes.      LABS:  Reviewed.      ASSESSMENT:   1.  An 84-year-old female with stage II right breast cancer.  No evidence of recurrence.   2.  Stage II colon cancer.  No evidence of recurrence   3.  Osteopenia.      PLAN:   1.  The patient is doing very well from breast cancer.  No evidence of recurrence.  She will continue on anastrozole.  She is tolerating it well.   2.   Discussed regarding colon cancer.  No evidence of recurrence.  For followup, we will get a CT of the chest, abdomen and pelvis and labs including a CEA in 6 months.   3.  She has osteopenia.  She does not want bisphosphonate or Prolia.  She will continue on calcium and vitamin D.   4.  I will see her in 6 months' time.  I advised her to return sooner if she has any lump, unexplained pain, weight loss, worsening weakness, shortness of breath or any other concerns.         WILFREDO COLON MD             D: 2020   T: 2020   MT: YOAN      Name:     ALTAF DEL ROSARIO   MRN:      [id number]        Account:      [id number]   :      1935           Visit Date:   2020      Document: [id number]

## 2020-07-06 ENCOUNTER — HOSPITAL ENCOUNTER (OUTPATIENT)
Dept: MAMMOGRAPHY | Facility: CLINIC | Age: 85
Discharge: HOME | End: 2020-07-06
Attending: INTERNAL MEDICINE | Admitting: INTERNAL MEDICINE
Payer: COMMERCIAL

## 2020-07-06 DIAGNOSIS — Z12.31 VISIT FOR SCREENING MAMMOGRAM: ICD-10-CM

## 2020-07-06 PROCEDURE — 77063 BREAST TOMOSYNTHESIS BI: CPT

## 2020-08-24 ENCOUNTER — HOSPITAL ENCOUNTER (OUTPATIENT)
Dept: LAB | Facility: CLINIC | Age: 85
End: 2020-08-24
Attending: INTERNAL MEDICINE
Payer: COMMERCIAL

## 2020-08-24 ENCOUNTER — HOSPITAL ENCOUNTER (OUTPATIENT)
Dept: CT IMAGING | Facility: CLINIC | Age: 85
End: 2020-08-24
Attending: INTERNAL MEDICINE
Payer: COMMERCIAL

## 2020-08-24 DIAGNOSIS — C50.211 MALIGNANT NEOPLASM OF UPPER-INNER QUADRANT OF RIGHT BREAST IN FEMALE, ESTROGEN RECEPTOR POSITIVE (H): ICD-10-CM

## 2020-08-24 DIAGNOSIS — Z17.0 MALIGNANT NEOPLASM OF UPPER-INNER QUADRANT OF RIGHT BREAST IN FEMALE, ESTROGEN RECEPTOR POSITIVE (H): ICD-10-CM

## 2020-08-24 DIAGNOSIS — C18.9 MALIGNANT NEOPLASM OF COLON, UNSPECIFIED PART OF COLON (H): ICD-10-CM

## 2020-08-24 LAB
ALBUMIN SERPL-MCNC: 3.9 G/DL (ref 3.4–5)
ALP SERPL-CCNC: 60 U/L (ref 40–150)
ALT SERPL W P-5'-P-CCNC: 33 U/L (ref 0–50)
ANION GAP SERPL CALCULATED.3IONS-SCNC: 6 MMOL/L (ref 3–14)
AST SERPL W P-5'-P-CCNC: 24 U/L (ref 0–45)
BILIRUB SERPL-MCNC: 0.6 MG/DL (ref 0.2–1.3)
BUN SERPL-MCNC: 21 MG/DL (ref 7–30)
CALCIUM SERPL-MCNC: 8.7 MG/DL (ref 8.5–10.1)
CEA SERPL-MCNC: 1.7 UG/L (ref 0–2.5)
CHLORIDE SERPL-SCNC: 107 MMOL/L (ref 94–109)
CO2 SERPL-SCNC: 27 MMOL/L (ref 20–32)
CREAT BLD-MCNC: 0.9 MG/DL (ref 0.52–1.04)
CREAT SERPL-MCNC: 0.84 MG/DL (ref 0.52–1.04)
ERYTHROCYTE [DISTWIDTH] IN BLOOD BY AUTOMATED COUNT: 13.2 % (ref 10–15)
GFR SERPL CREATININE-BSD FRML MDRD: 60 ML/MIN/{1.73_M2}
GFR SERPL CREATININE-BSD FRML MDRD: 63 ML/MIN/{1.73_M2}
GLUCOSE SERPL-MCNC: 99 MG/DL (ref 70–99)
HCT VFR BLD AUTO: 39.4 % (ref 35–47)
HGB BLD-MCNC: 13.6 G/DL (ref 11.7–15.7)
MCH RBC QN AUTO: 32.3 PG (ref 26.5–33)
MCHC RBC AUTO-ENTMCNC: 34.5 G/DL (ref 31.5–36.5)
MCV RBC AUTO: 94 FL (ref 78–100)
PLATELET # BLD AUTO: 280 10E9/L (ref 150–450)
POTASSIUM SERPL-SCNC: 4.2 MMOL/L (ref 3.4–5.3)
PROT SERPL-MCNC: 7.7 G/DL (ref 6.8–8.8)
RBC # BLD AUTO: 4.21 10E12/L (ref 3.8–5.2)
SODIUM SERPL-SCNC: 140 MMOL/L (ref 133–144)
WBC # BLD AUTO: 3.7 10E9/L (ref 4–11)

## 2020-08-24 PROCEDURE — 80053 COMPREHEN METABOLIC PANEL: CPT | Performed by: INTERNAL MEDICINE

## 2020-08-24 PROCEDURE — 71260 CT THORAX DX C+: CPT

## 2020-08-24 PROCEDURE — 82378 CARCINOEMBRYONIC ANTIGEN: CPT | Performed by: INTERNAL MEDICINE

## 2020-08-24 PROCEDURE — 25000128 H RX IP 250 OP 636: Performed by: INTERNAL MEDICINE

## 2020-08-24 PROCEDURE — 82565 ASSAY OF CREATININE: CPT

## 2020-08-24 PROCEDURE — 85027 COMPLETE CBC AUTOMATED: CPT | Performed by: INTERNAL MEDICINE

## 2020-08-24 PROCEDURE — 25000125 ZZHC RX 250: Performed by: INTERNAL MEDICINE

## 2020-08-24 RX ORDER — IOPAMIDOL 755 MG/ML
72 INJECTION, SOLUTION INTRAVASCULAR ONCE
Status: COMPLETED | OUTPATIENT
Start: 2020-08-24 | End: 2020-08-24

## 2020-08-24 RX ADMIN — SODIUM CHLORIDE 61 ML: 9 INJECTION, SOLUTION INTRAVENOUS at 08:42

## 2020-08-24 RX ADMIN — IOPAMIDOL 72 ML: 755 INJECTION, SOLUTION INTRAVENOUS at 08:42

## 2020-08-26 ENCOUNTER — ONCOLOGY VISIT (OUTPATIENT)
Dept: ONCOLOGY | Facility: CLINIC | Age: 85
End: 2020-08-26
Attending: INTERNAL MEDICINE
Payer: COMMERCIAL

## 2020-08-26 VITALS
BODY MASS INDEX: 23.6 KG/M2 | DIASTOLIC BLOOD PRESSURE: 67 MMHG | OXYGEN SATURATION: 98 % | SYSTOLIC BLOOD PRESSURE: 125 MMHG | HEART RATE: 66 BPM | TEMPERATURE: 98.2 F | RESPIRATION RATE: 16 BRPM | WEIGHT: 146.2 LBS

## 2020-08-26 DIAGNOSIS — C50.211 MALIGNANT NEOPLASM OF UPPER-INNER QUADRANT OF RIGHT BREAST IN FEMALE, ESTROGEN RECEPTOR POSITIVE (H): Primary | ICD-10-CM

## 2020-08-26 DIAGNOSIS — Z17.0 MALIGNANT NEOPLASM OF UPPER-INNER QUADRANT OF RIGHT BREAST IN FEMALE, ESTROGEN RECEPTOR POSITIVE (H): Primary | ICD-10-CM

## 2020-08-26 DIAGNOSIS — C18.9 MALIGNANT NEOPLASM OF COLON, UNSPECIFIED PART OF COLON (H): ICD-10-CM

## 2020-08-26 PROCEDURE — 99213 OFFICE O/P EST LOW 20 MIN: CPT | Performed by: INTERNAL MEDICINE

## 2020-08-26 PROCEDURE — G0463 HOSPITAL OUTPT CLINIC VISIT: HCPCS

## 2020-08-26 ASSESSMENT — PAIN SCALES - GENERAL: PAINLEVEL: NO PAIN (0)

## 2020-08-26 NOTE — PROGRESS NOTES
Visit Date:   08/26/2020     ONCOLOGIC HISTORY:  Wanda Dockery is a female with:  -Stage IIB (pT2 pN1a) right breast cancer. ER positive and HER-2/mario positive  -Stage II (pT2 pN0 M0) colon cancer.     1. Bilateral diagnostic mammograms and right breast ultrasound on 06/23/2015 revealed 3 cm lesion at 1 o'clock position and right axillary lymphadenopathy.   -Ultrasound-guided biopsy of right breast and axillary lymph node on 06/29/2015 revealed grade 3 invasive ductal carcinoma, both in the breast and axillary lymph node. ER positive and HER-2/mario equivocal by FISH. IHC is 3+ in the right breast. In the right lymph node IHC is 2+.     2. Bone scan on 07/07/2015 did not reveal any bone metastasis.   -CT of the chest, abdomen and pelvis on 07/07/2015 revealed right breast lesion and 1.2 x 2 cm right axillary lymph node. There is thickening of the wall of the hepatitic flexure of the colon with associated inflammatory changes and multiple mildly prominent lymph nodes in the mesentery.   -Colonoscopy on 07/13/2015 revealed partially obstructing mass at the hepatic flexure and a sessile polyp in the cecum.  Hepatic flexure mass revealed poorly differentiated adenocarcinoma. 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 There is absence of expression of DNA mismatch repair protein MLH1 and PMS2.      3. Right breast lumpectomy, right lymph node dissection and right colectomy was done on 07/16/2015.    -Hemicolectomy specimen revealed high-grade adenocarcinoma measuring 7.5 cm extending into the santi-intestinal fatty tissue. Margins were negative. No lymphovascular invasion. Twenty-one benign lymph nodes. She has stage II (pT2 pN0 M0) colon cancer.   -Right breast reveals invasive ductal carcinoma, grade 2. It measures 2.1 cm. There was no DCIS. There was lymphovascular invasion present. Margins positive for invasive carcinoma. One of 7 lymph nodes positive. No extranodal extension. The patient has stage IIB (pT2 pN1a) breast  cancer.   -Reexcision of lumpectomy site on 07/29/2015. There was no residual malignancy.      4. Taxol and Herceptin weekly x 12 between 08/17/2015 and 11/02/2015. One year of herceptin completed on 08/08/2016.  -Anastrazole started 11/30/2015.  -Radiation to breast cancer.  5040 cGy between 04/18/2016 and 06/15/2016.       5. DEXA scan on 04/25/2016 reveals osteopenia.  -DEXA scan on 08/09/2019 reveals worsening osteopenia.     6. Colonoscopy on 08/12/2019 does not reveal any malignancy.     SUBJECTIVE:  Mrs. Dockery is an 85-year-old female with history of breast cancer and colon cancer.  She had a stage II right breast cancer diagnosed in 2015.  ER positive and HER-2/mario positive.  She had stage II colon cancer also diagnosed in 2015.  The patient is doing well without any evidence of recurrence of malignancy.      She is on anastrozole.  She is tolerating it well.      CT chest, abdomen and pelvis on 08/24/2020 does not reveal any evidence of malignancy.      She has no complaints.  No headache.  No dizziness.  No neck pain.  No chest pain.  No shortness of breath.  No abdominal pain, nausea or vomiting. No urinary or bowel complaints.  No bleeding.  No fever, chills or night sweats.  Appetite has been good.      All other review of systems negative.      PHYSICAL EXAMINATION:   GENERAL:  She is alert and oriented x 3.   VITAL SIGNS:  Reviewed.  ECOG PS of 0.   The rest of systems not examined.      LABORATORY DATA:  Reviewed.      ASSESSMENT:   1.  Stage II right breast cancer.  No evidence of recurrence.   2.  Stage II colon cancer.  No evidence of recurrence.   3.  Mild leukopenia   4.  Osteopenia.      PLAN:   1.  CT scan and labs were reviewed with the patient.  I explained to her there is no evidence of recurrence of malignancy.  She was happy to know that.   2.  For breast cancer, she will continue on anastrozole.  The patient wants to stop it after 5 years.  She will stop it in 12/2020.   3.  From colon  cancer, she is doing good.  It is 5 years.  No evidence of recurrence.  Risks of recurrence is very low.   4.  For osteopenia, she will continue on calcium and vitamin D.  She does not want any bisphosphonate or Prolia.   5. I explained to the patient that her leukopenia is mild and we will just monitor it.   6.  I will see her in 6 months' time with labs.  Advised her to see a physician sooner if she has any lump, unexplained pain, weight loss, worsening weakness, shortness of breath, recurrent vomiting, bleeding, or any other concerns.        WILFREDO COLON MD             D: 2020   T: 2020   MT: MARLENY      Name:     ALTAF DEL ROSARIO   MRN:      [id number]        Account:      [id number]   :      1935           Visit Date:   2020      Document: [id number]

## 2020-08-26 NOTE — PROGRESS NOTES
"Oncology Rooming Note    August 26, 2020 8:27 AM   Wanda Dockery is a 85 year old female who presents for:    Chief Complaint   Patient presents with     Oncology Clinic Visit     Initial Vitals: /67   Pulse 66   Temp 98.2  F (36.8  C) (Oral)   Resp 16   Wt 66.3 kg (146 lb 3.2 oz)   SpO2 98%   BMI 23.60 kg/m   Estimated body mass index is 23.6 kg/m  as calculated from the following:    Height as of 2/24/20: 1.676 m (5' 6\").    Weight as of this encounter: 66.3 kg (146 lb 3.2 oz). Body surface area is 1.76 meters squared.  No Pain (0) Comment: Data Unavailable   No LMP recorded. Patient is postmenopausal.  Allergies reviewed: Yes  Medications reviewed: Yes    Medications: Medication refills not needed today.  Pharmacy name entered into [facility]: [facility] [place], [place] - [address] [id number]    Clinical concerns: no       Shari J. Schoenberger, CMA            "

## 2020-08-26 NOTE — PATIENT INSTRUCTIONS
1. Labs in 6 months.  2. Stop anastrazole in December 2020.  3. Follow-up in 6 months.  4. Continue calcium and vitamin D.

## 2020-08-26 NOTE — LETTER
"    8/26/2020         RE: Wanda Dockery  [address] [place] [address]        Dear Colleague,    Thank you for referring your patient, Wanda Dockery, to the [facility]. Please see a copy of my visit note below.    Oncology Rooming Note    August 26, 2020 8:27 AM   Wanda Dockery is a 85 year old female who presents for:    Chief Complaint   Patient presents with     Oncology Clinic Visit     Initial Vitals: /67   Pulse 66   Temp 98.2  F (36.8  C) (Oral)   Resp 16   Wt 66.3 kg (146 lb 3.2 oz)   SpO2 98%   BMI 23.60 kg/m   Estimated body mass index is 23.6 kg/m  as calculated from the following:    Height as of 2/24/20: 1.676 m (5' 6\").    Weight as of this encounter: 66.3 kg (146 lb 3.2 oz). Body surface area is 1.76 meters squared.  No Pain (0) Comment: Data Unavailable   No LMP recorded. Patient is postmenopausal.  Allergies reviewed: Yes  Medications reviewed: Yes    Medications: Medication refills not needed today.  Pharmacy name entered into [employer]: [facility]  [place], [place] - [address] [facility]    Clinical concerns: no       Shari J. Schoenberger, [facility]              Visit Date:   08/26/2020      SUBJECTIVE:  Ms. Dockery is an 85-year-old female with a history of breast cancer and colon cancer.  She had a stage II right breast cancer diagnosed in 2015.  ER positive, HER-2/mario positive.  She had stage II colon cancer also diagnosed in 2015.  The patient is doing well without any evidence of recurrence of malignancy.      She is on anastrozole.  She is tolerating it well.      CT chest, abdomen and pelvis on 08/24/2020 does not reveal any evidence of malignancy.      She has no complaints.  No headache.  No dizziness.  No neck pain.  No chest pain.  No shortness of breath.  No abdominal pain, nausea or vomiting.  No urinary or bowel complaints.  No bleeding.  No fever, chills or night sweats.  Appetite has been good.      All other review of systems negative.    "   PHYSICAL EXAMINATION:   GENERAL:  She is alert and oriented x 3.   VITAL SIGNS:  Reviewed.  ECOG PS of 0.   The rest of systems not examined.      LABORATORY DATA:  Reviewed.      ASSESSMENT:   1.  Stage II right breast cancer.  No evidence of recurrence.   2.  Stage II colon cancer.  No evidence of recurrence.   3.  Mild leukopenia   4.  Osteopenia.      PLAN:   1.  CT scan and labs were reviewed with the patient.  I explained to her there is no evidence of recurrence of malignancy.  She was happy to know that.   2.  For breast cancer, she will continue on anastrozole.  The patient wants to stop it after 5 years.  She will stop it in 2020.   3.  From colon cancer, she is doing good.  It is 5 years.  No evidence of recurrence.  Risks of recurrence is very low.   4.  For osteopenia, she will continue on calcium and vitamin D.  She does not want any bisphosphonate or Prolia.   5.  I will see her in 6 months' time with labs.  Advised her to see a physician sooner if she has any lump, unexplained pain, weight loss, worsening weakness, shortness of breath, recurrent vomiting, bleeding, or any other concerns.  I explained to the patient that her leukopenia is mild and we will just monitor it.         WILFREDO COLON MD             D: 2020   T: 2020   MT: MARLENY      Name:     ALTAF DEL ROSARIO   MRN:      [id number]        Account:      [id number]   :      1935           Visit Date:   2020      Document: [id number]       Again, thank you for allowing me to participate in the care of your patient.        Sincerely,        Wilfredo Colon MD

## 2020-09-05 NOTE — PROGRESS NOTES
Visit Date:   08/26/2020     ONCOLOGIC HISTORY:  Wanda Dockery is a female with:  -Stage IIB (pT2 pN1a) right breast cancer. ER positive and HER-2/mario positive  -Stage II (pT2 pN0 M0) colon cancer.     1. Bilateral diagnostic mammograms and right breast ultrasound on 06/23/2015 revealed 3 cm lesion at 1 o'clock position and right axillary lymphadenopathy.   -Ultrasound-guided biopsy of right breast and axillary lymph node on 06/29/2015 revealed grade 3 invasive ductal carcinoma, both in the breast and axillary lymph node. ER positive and HER-2/mario equivocal by FISH. IHC is 3+ in the right breast. In the right lymph node IHC is 2+.     2. Bone scan on 07/07/2015 did not reveal any bone metastasis.   -CT of the chest, abdomen and pelvis on 07/07/2015 revealed right breast lesion and 1.2 x 2 cm right axillary lymph node. There is thickening of the wall of the hepatitic flexure of the colon with associated inflammatory changes and multiple mildly prominent lymph nodes in the mesentery.   -Colonoscopy on 07/13/2015 revealed partially obstructing mass at the hepatic flexure and a sessile polyp in the cecum.  Hepatic flexure mass revealed poorly differentiated adenocarcinoma. 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 There is absence of expression of DNA mismatch repair protein MLH1 and PMS2.      3. Right breast lumpectomy, right lymph node dissection and right colectomy was done on 07/16/2015.    -Hemicolectomy specimen revealed high-grade adenocarcinoma measuring 7.5 cm extending into the santi-intestinal fatty tissue. Margins were negative. No lymphovascular invasion. Twenty-one benign lymph nodes. She has stage II (pT2 pN0 M0) colon cancer.   -Right breast reveals invasive ductal carcinoma, grade 2. It measures 2.1 cm. There was no DCIS. There was lymphovascular invasion present. Margins positive for invasive carcinoma. One of 7 lymph nodes positive. No extranodal extension. The patient has stage IIB (pT2 pN1a) breast  cancer.   -Reexcision of lumpectomy site on 07/29/2015. There was no residual malignancy.      4. Taxol and Herceptin weekly x 12 between 08/17/2015 and 11/02/2015. One year of herceptin completed on 08/08/2016.  -Anastrazole started 11/30/2015.  -Radiation to breast cancer.  5040 cGy between 04/18/2016 and 06/15/2016.       5. DEXA scan on 04/25/2016 reveals osteopenia.  -DEXA scan on 08/09/2019 reveals worsening osteopenia.     6. Colonoscopy on 08/12/2019 does not reveal any malignancy.     SUBJECTIVE:  Mrs. Dockery is an 85-year-old female with history of breast cancer and colon cancer.  She had a stage II right breast cancer diagnosed in 2015.  ER positive and HER-2/mario positive.  She had stage II colon cancer also diagnosed in 2015.  The patient is doing well without any evidence of recurrence of malignancy.      She is on anastrozole.  She is tolerating it well.      CT chest, abdomen and pelvis on 08/24/2020 does not reveal any evidence of malignancy.      She has no complaints.  No headache.  No dizziness.  No neck pain.  No chest pain.  No shortness of breath.  No abdominal pain, nausea or vomiting. No urinary or bowel complaints.  No bleeding.  No fever, chills or night sweats.  Appetite has been good.      All other review of systems negative.      PHYSICAL EXAMINATION:   GENERAL:  She is alert and oriented x 3.   VITAL SIGNS:  Reviewed.  ECOG PS of 0.   The rest of systems not examined.      LABORATORY DATA:  Reviewed.      ASSESSMENT:   1.  Stage II right breast cancer.  No evidence of recurrence.   2.  Stage II colon cancer.  No evidence of recurrence.   3.  Mild leukopenia   4.  Osteopenia.      PLAN:   1.  CT scan and labs were reviewed with the patient.  I explained to her there is no evidence of recurrence of malignancy.  She was happy to know that.   2.  For breast cancer, she will continue on anastrozole.  The patient wants to stop it after 5 years.  She will stop it in 12/2020.   3.  From colon  cancer, she is doing good.  It is 5 years.  No evidence of recurrence.  Risks of recurrence is very low.   4.  For osteopenia, she will continue on calcium and vitamin D.  She does not want any bisphosphonate or Prolia.   5. I explained to the patient that her leukopenia is mild and we will just monitor it.   6.  I will see her in 6 months' time with labs.  Advised her to see a physician sooner if she has any lump, unexplained pain, weight loss, worsening weakness, shortness of breath, recurrent vomiting, bleeding, or any other concerns.        WILFREDO COLON MD             D: 2020   T: 2020   MT: MARLENY      Name:     ALTAF DEL ROSARIO   MRN:      [id number]        Account:      [id number]   :      1935           Visit Date:   2020      Document: [id number]

## 2020-09-28 DIAGNOSIS — C50.211 MALIGNANT NEOPLASM OF UPPER-INNER QUADRANT OF RIGHT BREAST IN FEMALE, ESTROGEN RECEPTOR POSITIVE (H): ICD-10-CM

## 2020-09-28 DIAGNOSIS — Z17.0 MALIGNANT NEOPLASM OF UPPER-INNER QUADRANT OF RIGHT BREAST IN FEMALE, ESTROGEN RECEPTOR POSITIVE (H): ICD-10-CM

## 2020-09-28 RX ORDER — ANASTROZOLE 1 MG/1
1 TABLET ORAL DAILY
Qty: 90 TABLET | Refills: 3 | Status: SHIPPED | OUTPATIENT
Start: 2020-09-28 | End: 2022-08-03

## 2020-10-27 ENCOUNTER — TELEPHONE (OUTPATIENT)
Dept: FAMILY MEDICINE | Facility: CLINIC | Age: 85
End: 2020-10-27

## 2020-10-27 NOTE — TELEPHONE ENCOUNTER
Reason for Call:  Other [employer] ins/New Provider    Detailed comments: Wanda calling in to see if Mariah had any recommendations for other providers outside of [facility] due to receiving the letter about [employer] and [facility].    Please call her back with any recommendations.    Phone Number Patient can be reached at: Cell number on file:    Telephone Information:   Mobile [phone]       Best Time: any    Can we leave a detailed message on this number? YES    Call taken on 10/27/2020 at 4:14 PM by Eusebio Stafford

## 2020-10-28 NOTE — TELEPHONE ENCOUNTER
Unfortunately I don't know of other primary care providers outside of  since I have only been in the  system so she will have to see who her insurance covers and try them out.

## 2020-10-28 NOTE — TELEPHONE ENCOUNTER
Left detailed message on patient's voice mail (permission per patient during incoming call to clinic) with information/advise from Ginette Lemus.     Courtney KAT RN,BSN

## 2020-11-11 ENCOUNTER — TELEPHONE (OUTPATIENT)
Dept: ONCOLOGY | Facility: CLINIC | Age: 85
End: 2020-11-11

## 2020-11-11 NOTE — TELEPHONE ENCOUNTER
Pt called wanting to change her appt from Feb 2021 to before the end of the year. Her [employer] insurance will no longer be accepted after the new year.   Please call her to arrange if ok with Dr Rodas. His nurse will be cc'd in this message.   Corey Garcias RN

## 2020-11-12 ENCOUNTER — TELEPHONE (OUTPATIENT)
Dept: ONCOLOGY | Facility: CLINIC | Age: 85
End: 2020-11-12

## 2020-11-12 NOTE — TELEPHONE ENCOUNTER
Message left for patient to reschedule labs + Dr Rodas from 2-2021 to  okay per Gabrielle/Dr Rodas.

## 2020-11-13 ENCOUNTER — TELEPHONE (OUTPATIENT)
Dept: ONCOLOGY | Facility: CLINIC | Age: 85
End: 2020-11-13

## 2020-11-13 NOTE — TELEPHONE ENCOUNTER
Representative from [facility] called and left a lengthy voicemail on the scheduling line regarding benefits and authroizationfor this patient. End of message statement was - call member services with any questions.

## 2020-11-16 NOTE — TELEPHONE ENCOUNTER
Noted, patient did schedule earlier appointment due to changes in provider coverage.    Claudine West RN

## 2020-12-21 ENCOUNTER — HOSPITAL ENCOUNTER (OUTPATIENT)
Facility: CLINIC | Age: 85
Setting detail: SPECIMEN
Discharge: HOME | End: 2020-12-21
Attending: INTERNAL MEDICINE | Admitting: INTERNAL MEDICINE
Payer: COMMERCIAL

## 2020-12-21 ENCOUNTER — OFFICE VISIT (OUTPATIENT)
Dept: INFUSION THERAPY | Facility: CLINIC | Age: 85
End: 2020-12-21
Attending: INTERNAL MEDICINE
Payer: COMMERCIAL

## 2020-12-21 DIAGNOSIS — C18.9 MALIGNANT NEOPLASM OF COLON, UNSPECIFIED PART OF COLON (H): ICD-10-CM

## 2020-12-21 DIAGNOSIS — C50.211 MALIGNANT NEOPLASM OF UPPER-INNER QUADRANT OF RIGHT BREAST IN FEMALE, ESTROGEN RECEPTOR POSITIVE (H): ICD-10-CM

## 2020-12-21 DIAGNOSIS — Z17.0 MALIGNANT NEOPLASM OF UPPER-INNER QUADRANT OF RIGHT BREAST IN FEMALE, ESTROGEN RECEPTOR POSITIVE (H): ICD-10-CM

## 2020-12-21 LAB
ABO + RH BLD: NORMAL
ABO + RH BLD: NORMAL
ALBUMIN SERPL-MCNC: 3.6 G/DL (ref 3.4–5)
ALP SERPL-CCNC: 73 U/L (ref 40–150)
ALT SERPL W P-5'-P-CCNC: 27 U/L (ref 0–50)
AST SERPL W P-5'-P-CCNC: 19 U/L (ref 0–45)
BILIRUB DIRECT SERPL-MCNC: 0.2 MG/DL (ref 0–0.2)
BILIRUB SERPL-MCNC: 0.5 MG/DL (ref 0.2–1.3)
CEA SERPL-MCNC: 2.4 UG/L (ref 0–2.5)
ERYTHROCYTE [DISTWIDTH] IN BLOOD BY AUTOMATED COUNT: 12.5 % (ref 10–15)
HCT VFR BLD AUTO: 39.2 % (ref 35–47)
HGB BLD-MCNC: 13.4 G/DL (ref 11.7–15.7)
MCH RBC QN AUTO: 31.5 PG (ref 26.5–33)
MCHC RBC AUTO-ENTMCNC: 34.2 G/DL (ref 31.5–36.5)
MCV RBC AUTO: 92 FL (ref 78–100)
PLATELET # BLD AUTO: 295 10E9/L (ref 150–450)
PROT SERPL-MCNC: 7.3 G/DL (ref 6.8–8.8)
RBC # BLD AUTO: 4.25 10E12/L (ref 3.8–5.2)
SPECIMEN EXP DATE BLD: NORMAL
WBC # BLD AUTO: 4 10E9/L (ref 4–11)

## 2020-12-21 PROCEDURE — 80076 HEPATIC FUNCTION PANEL: CPT | Performed by: INTERNAL MEDICINE

## 2020-12-21 PROCEDURE — 36415 COLL VENOUS BLD VENIPUNCTURE: CPT

## 2020-12-21 PROCEDURE — 86900 BLOOD TYPING SEROLOGIC ABO: CPT | Performed by: INTERNAL MEDICINE

## 2020-12-21 PROCEDURE — 82378 CARCINOEMBRYONIC ANTIGEN: CPT | Performed by: INTERNAL MEDICINE

## 2020-12-21 PROCEDURE — 86901 BLOOD TYPING SEROLOGIC RH(D): CPT | Performed by: INTERNAL MEDICINE

## 2020-12-21 PROCEDURE — 85027 COMPLETE CBC AUTOMATED: CPT | Performed by: INTERNAL MEDICINE

## 2020-12-21 NOTE — PROGRESS NOTES
Medical Assistant Note:  Wanda LPOEZ Sarkis presents today for lab draw.    Patient seen by provider today: No.   present during visit today: Not Applicable.    Concerns: No Concerns.    Procedure:  Lab draw site: LAC, Needle type: Bf, Gauge: 21. Gauze and coban applied    Post Assessment:  Labs drawn without difficulty: Yes.    Discharge Plan:  Departure Mode: Ambulatory.    Face to Face Time: 5.    Iva Mathias CMA

## 2020-12-21 NOTE — LETTER
12/21/2020         RE: Wanda Dockery  [address] [place] [address]        Dear Colleague,    Thank you for referring your patient, Wanda Dockery, to the [facility]. Please see a copy of my visit note below.    Medical Assistant Note:  Wanda Dockery presents today for lab draw.    Patient seen by provider today: No.   present during visit today: Not Applicable.    Concerns: No Concerns.    Procedure:  Lab draw site: LAC, Needle type: Bf, Gauge: 21. Gauze and coban applied    Post Assessment:  Labs drawn without difficulty: Yes.    Discharge Plan:  Departure Mode: Ambulatory.    Face to Face Time: 5.    Iva Mathias [facility]                Again, thank you for allowing me to participate in the care of your patient.        Sincerely,         Lab Draw

## 2020-12-28 ENCOUNTER — ONCOLOGY VISIT (OUTPATIENT)
Dept: ONCOLOGY | Facility: CLINIC | Age: 85
End: 2020-12-28
Attending: INTERNAL MEDICINE
Payer: COMMERCIAL

## 2020-12-28 VITALS
BODY MASS INDEX: 23.27 KG/M2 | WEIGHT: 144.2 LBS | TEMPERATURE: 98.2 F | OXYGEN SATURATION: 98 % | SYSTOLIC BLOOD PRESSURE: 130 MMHG | HEART RATE: 77 BPM | DIASTOLIC BLOOD PRESSURE: 79 MMHG | RESPIRATION RATE: 16 BRPM

## 2020-12-28 DIAGNOSIS — C18.9 MALIGNANT NEOPLASM OF COLON, UNSPECIFIED PART OF COLON (H): Primary | ICD-10-CM

## 2020-12-28 PROCEDURE — G0463 HOSPITAL OUTPT CLINIC VISIT: HCPCS

## 2020-12-28 PROCEDURE — 99214 OFFICE O/P EST MOD 30 MIN: CPT | Performed by: INTERNAL MEDICINE

## 2020-12-28 ASSESSMENT — PAIN SCALES - GENERAL: PAINLEVEL: NO PAIN (0)

## 2020-12-28 NOTE — LETTER
12/28/2020         RE: Wanda Dockery  [address] [place] [address]        Dear Colleague,    Thank you for referring your patient, Wanda Dockery, to the [facility]. Please see a copy of my visit note below.    Visit Date:   12/28/2020     ONCOLOGIC HISTORY:  Wanda Dockery is a female with:  -Stage IIB (pT2 pN1a) right breast cancer. ER positive and HER-2/mario positive  -Stage II (pT2 pN0 M0) colon cancer.     1. Bilateral diagnostic mammograms and right breast ultrasound on 06/23/2015 revealed 3 cm lesion at 1 o'clock position and right axillary lymphadenopathy.   -Ultrasound-guided biopsy of right breast and axillary lymph node on 06/29/2015 revealed grade 3 invasive ductal carcinoma, both in the breast and axillary lymph node. ER positive and HER-2/mario equivocal by FISH. IHC is 3+ in the right breast. In the right lymph node IHC is 2+.     2. Bone scan on 07/07/2015 did not reveal any bone metastasis.   -CT of the chest, abdomen and pelvis on 07/07/2015 revealed right breast lesion and 1.2 x 2 cm right axillary lymph node. There is thickening of the wall of the hepatitic flexure of the colon with associated inflammatory changes and multiple mildly prominent lymph nodes in the mesentery.   -Colonoscopy on 07/13/2015 revealed partially obstructing mass at the hepatic flexure and a sessile polyp in the cecum.  Hepatic flexure mass revealed poorly differentiated adenocarcinoma. 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 There is absence of expression of DNA mismatch repair protein MLH1 and PMS2.      3. Right breast lumpectomy, right lymph node dissection and right colectomy was done on 07/16/2015.    -Hemicolectomy specimen revealed high-grade adenocarcinoma measuring 7.5 cm extending into the santi-intestinal fatty tissue. Margins were negative. No lymphovascular invasion. Twenty-one benign lymph nodes. She has stage II (pT2 pN0 M0) colon cancer.   -Right breast reveals invasive  ductal carcinoma, grade 2. It measures 2.1 cm. There was no DCIS. There was lymphovascular invasion present. Margins positive for invasive carcinoma. One of 7 lymph nodes positive. No extranodal extension. The patient has stage IIB (pT2 pN1a) breast cancer.   -Reexcision of lumpectomy site on 07/29/2015. There was no residual malignancy.      4. Taxol and Herceptin weekly x 12 between 08/17/2015 and 11/02/2015. One year of herceptin completed on 08/08/2016.  -Anastrazole x 5 years between 11/30/2015 and 12/2020.  -Radiation to breast cancer.  5040 cGy between 04/18/2016 and 06/15/2016.       5. DEXA scan on 04/25/2016 reveals osteopenia.  -DEXA scan on 08/09/2019 reveals worsening osteopenia.     6. Colonoscopy on 08/12/2019 does not reveal any malignancy.     SUBJECTIVE:  Mrs. Dockery is an 85-year-old female with history of breast cancer and colon cancer.  She is 5 years out.  She is doing well.      She has no complaints or concerns.  No headache.  No dizziness.  No neck pain.  No chest pain.  No shortness of breath.  No abdominal pain, nausea or vomiting.  Appetite is good.  No urinary or bowel complaints.  No abnormal bleeding.  No fever, chills or night sweats.      All other review of systems negative.      PHYSICAL EXAMINATION:   Alert and oriented x 3.   VITAL SIGNS: Reviewed. ECOG PS of 1.  EYES: No icterus.   THROAT: No ulcer or thrush.   NECK: Supple. No lymphadenopathy or thyromegaly.   AXILLAE: No lymphadenopathy.   LUNGS: Good air entry bilaterally. No crackles or wheezing.   HEART: Regular. No murmur.   GI: Abdomen is soft. Nontender. No mass.   EXTREMITIES: No edema. No calf swelling or tenderness.   SKIN: No petechiae.  BREASTS:  Examined in the presence of the nurse.  Both breast exams are normal.  No suspicious mass, nodule or skin changes.      LABORATORY DATA:  Reviewed.      ASSESSMENT:   1.  An 85-year-old female with stage II right breast cancer diagnosed in 2015.  No evidence of recurrence.    2.  Stage II colon cancer diagnosed in .  No evidence of recurrence.      PLAN:   1.  I discussed regarding breast cancer.  The patient is doing well.  No evidence of recurrence.  It is more than 5 years since her diagnosis.      She is on anastrozole.  She has 2 more days left.  She is going to stop anastrozole after that.  She would have completed 5 years.  I told her that she should do well from breast cancer.      With regards to breast cancer, she needs yearly physical exam and mammogram.        2.  Discussed regarding colon cancer.  She is doing well.  Last CT scan in 2020 did not reveal any malignancy.  She had colonoscopy in 2019 which did not reveal any malignancy.     3.  Labs were reviewed.  CBC and LFTs are normal.  Her CEA is normal, but has increased slightly in 1 year from 1.4 to 2.4.      I advised the patient to have another CEA checked in 3 months.  If it gets higher, we will get a CT scan.  She is agreeable for it.      4.  Discussed regarding followup.  The patient mentioned that there is change in insurance, which restricts her to come to [place].  I told her that she can see me in a year's time. At that time, we will recheck labs including CBC, CEA, and CMP.  Advised her to see a physician sooner if she has any lump, unexplained pain, weight loss, worsening weakness, change in bowel habits, bleeding or any other concerns.         WILFREDO COLON MD             D: 2020   T: 2020   MT: CECILE      Name:     ALTAF DEL ROSARIO   MRN:      [id number]        Account:      [id number]   :      1935           Visit Date:   2020      Document: [id number]      This office note has been dictated.          Again, thank you for allowing me to participate in the care of your patient.        Sincerely,        Wilfredo Colon MD

## 2020-12-28 NOTE — PATIENT INSTRUCTIONS
1. CEA in 3 months. Scheduled.  2. See me in 1 year with labs. (wants to be called closer to the year) 12/28/20 tm

## 2021-01-03 NOTE — PROGRESS NOTES
Visit Date:   12/28/2020     ONCOLOGIC HISTORY:  Wanda Dockery is a female with:  -Stage IIB (pT2 pN1a) right breast cancer. ER positive and HER-2/mario positive  -Stage II (pT2 pN0 M0) colon cancer.     1. Bilateral diagnostic mammograms and right breast ultrasound on 06/23/2015 revealed 3 cm lesion at 1 o'clock position and right axillary lymphadenopathy.   -Ultrasound-guided biopsy of right breast and axillary lymph node on 06/29/2015 revealed grade 3 invasive ductal carcinoma, both in the breast and axillary lymph node. ER positive and HER-2/mario equivocal by FISH. IHC is 3+ in the right breast. In the right lymph node IHC is 2+.     2. Bone scan on 07/07/2015 did not reveal any bone metastasis.   -CT of the chest, abdomen and pelvis on 07/07/2015 revealed right breast lesion and 1.2 x 2 cm right axillary lymph node. There is thickening of the wall of the hepatitic flexure of the colon with associated inflammatory changes and multiple mildly prominent lymph nodes in the mesentery.   -Colonoscopy on 07/13/2015 revealed partially obstructing mass at the hepatic flexure and a sessile polyp in the cecum.  Hepatic flexure mass revealed poorly differentiated adenocarcinoma. 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 There is absence of expression of DNA mismatch repair protein MLH1 and PMS2.      3. Right breast lumpectomy, right lymph node dissection and right colectomy was done on 07/16/2015.    -Hemicolectomy specimen revealed high-grade adenocarcinoma measuring 7.5 cm extending into the santi-intestinal fatty tissue. Margins were negative. No lymphovascular invasion. Twenty-one benign lymph nodes. She has stage II (pT2 pN0 M0) colon cancer.   -Right breast reveals invasive ductal carcinoma, grade 2. It measures 2.1 cm. There was no DCIS. There was lymphovascular invasion present. Margins positive for invasive carcinoma. One of 7 lymph nodes positive. No extranodal extension. The patient has stage IIB (pT2 pN1a) breast  cancer.   -Reexcision of lumpectomy site on 07/29/2015. There was no residual malignancy.      4. Taxol and Herceptin weekly x 12 between 08/17/2015 and 11/02/2015. One year of herceptin completed on 08/08/2016.  -Anastrazole x 5 years between 11/30/2015 and 12/2020.  -Radiation to breast cancer.  5040 cGy between 04/18/2016 and 06/15/2016.       5. DEXA scan on 04/25/2016 reveals osteopenia.  -DEXA scan on 08/09/2019 reveals worsening osteopenia.     6. Colonoscopy on 08/12/2019 does not reveal any malignancy.     SUBJECTIVE:  Mrs. Dockery is an 85-year-old female with history of breast cancer and colon cancer.  She is 5 years out.  She is doing well.      She has no complaints or concerns.  No headache.  No dizziness.  No neck pain.  No chest pain.  No shortness of breath.  No abdominal pain, nausea or vomiting.  Appetite is good.  No urinary or bowel complaints.  No abnormal bleeding.  No fever, chills or night sweats.      All other review of systems negative.      PHYSICAL EXAMINATION:   Alert and oriented x 3.   VITAL SIGNS: Reviewed. ECOG PS of 1.  EYES: No icterus.   THROAT: No ulcer or thrush.   NECK: Supple. No lymphadenopathy or thyromegaly.   AXILLAE: No lymphadenopathy.   LUNGS: Good air entry bilaterally. No crackles or wheezing.   HEART: Regular. No murmur.   GI: Abdomen is soft. Nontender. No mass.   EXTREMITIES: No edema. No calf swelling or tenderness.   SKIN: No petechiae.  BREASTS:  Examined in the presence of the nurse.  Both breast exams are normal.  No suspicious mass, nodule or skin changes.      LABORATORY DATA:  Reviewed.      ASSESSMENT:   1.  An 85-year-old female with stage II right breast cancer diagnosed in 2015.  No evidence of recurrence.   2.  Stage II colon cancer diagnosed in 2015.  No evidence of recurrence.      PLAN:   1.  I discussed regarding breast cancer.  The patient is doing well.  No evidence of recurrence.  It is more than 5 years since her diagnosis.      She is on  anastrozole.  She has 2 more days left.  She is going to stop anastrozole after that.  She would have completed 5 years.  I told her that she should do well from breast cancer.      With regards to breast cancer, she needs yearly physical exam and mammogram.        2.  Discussed regarding colon cancer.  She is doing well.  Last CT scan in 2020 did not reveal any malignancy.  She had colonoscopy in 2019 which did not reveal any malignancy.     3.  Labs were reviewed.  CBC and LFTs are normal.  Her CEA is normal, but has increased slightly in 1 year from 1.4 to 2.4.      I advised the patient to have another CEA checked in 3 months.  If it gets higher, we will get a CT scan.  She is agreeable for it.      4.  Discussed regarding followup.  The patient mentioned that there is change in insurance, which restricts her to come to [place].  I told her that she can see me in a year's time. At that time, we will recheck labs including CBC, CEA, and CMP.  Advised her to see a physician sooner if she has any lump, unexplained pain, weight loss, worsening weakness, change in bowel habits, bleeding or any other concerns.         WILFREDO COLON MD             D: 2020   T: 2020   MT: CECILE      Name:     ALTAF DEL ROSARIO   MRN:      [id number]        Account:      [id number]   :      1935           Visit Date:   2020      Document: [id number]

## 2021-01-08 ENCOUNTER — TRANSFERRED RECORDS (OUTPATIENT)
Dept: HEALTH INFORMATION MANAGEMENT | Facility: CLINIC | Age: 86
End: 2021-01-08

## 2021-03-29 ENCOUNTER — INFUSION THERAPY VISIT (OUTPATIENT)
Dept: INFUSION THERAPY | Facility: CLINIC | Age: 86
End: 2021-03-29
Attending: INTERNAL MEDICINE
Payer: COMMERCIAL

## 2021-03-29 ENCOUNTER — HOSPITAL ENCOUNTER (OUTPATIENT)
Facility: CLINIC | Age: 86
Setting detail: SPECIMEN
Discharge: HOME | End: 2021-03-29
Attending: INTERNAL MEDICINE | Admitting: INTERNAL MEDICINE
Payer: COMMERCIAL

## 2021-03-29 DIAGNOSIS — C18.9 MALIGNANT NEOPLASM OF COLON, UNSPECIFIED PART OF COLON (H): ICD-10-CM

## 2021-03-29 LAB
ALBUMIN SERPL-MCNC: 3.9 G/DL (ref 3.4–5)
ALP SERPL-CCNC: 75 U/L (ref 40–150)
ALT SERPL W P-5'-P-CCNC: 28 U/L (ref 0–50)
ANION GAP SERPL CALCULATED.3IONS-SCNC: 3 MMOL/L (ref 3–14)
AST SERPL W P-5'-P-CCNC: 21 U/L (ref 0–45)
BILIRUB SERPL-MCNC: 0.6 MG/DL (ref 0.2–1.3)
BUN SERPL-MCNC: 19 MG/DL (ref 7–30)
CALCIUM SERPL-MCNC: 9.1 MG/DL (ref 8.5–10.1)
CEA SERPL-MCNC: 1.8 UG/L (ref 0–2.5)
CHLORIDE SERPL-SCNC: 106 MMOL/L (ref 94–109)
CO2 SERPL-SCNC: 30 MMOL/L (ref 20–32)
CREAT SERPL-MCNC: 0.9 MG/DL (ref 0.52–1.04)
ERYTHROCYTE [DISTWIDTH] IN BLOOD BY AUTOMATED COUNT: 12.8 % (ref 10–15)
GFR SERPL CREATININE-BSD FRML MDRD: 58 ML/MIN/{1.73_M2}
GLUCOSE SERPL-MCNC: 83 MG/DL (ref 70–99)
HCT VFR BLD AUTO: 40.8 % (ref 35–47)
HGB BLD-MCNC: 13.5 G/DL (ref 11.7–15.7)
MCH RBC QN AUTO: 30.9 PG (ref 26.5–33)
MCHC RBC AUTO-ENTMCNC: 33.1 G/DL (ref 31.5–36.5)
MCV RBC AUTO: 93 FL (ref 78–100)
PLATELET # BLD AUTO: 288 10E9/L (ref 150–450)
POTASSIUM SERPL-SCNC: 4.4 MMOL/L (ref 3.4–5.3)
PROT SERPL-MCNC: 7.8 G/DL (ref 6.8–8.8)
RBC # BLD AUTO: 4.37 10E12/L (ref 3.8–5.2)
SODIUM SERPL-SCNC: 139 MMOL/L (ref 133–144)
WBC # BLD AUTO: 4.2 10E9/L (ref 4–11)

## 2021-03-29 PROCEDURE — 82378 CARCINOEMBRYONIC ANTIGEN: CPT | Performed by: INTERNAL MEDICINE

## 2021-03-29 PROCEDURE — 85027 COMPLETE CBC AUTOMATED: CPT | Performed by: INTERNAL MEDICINE

## 2021-03-29 PROCEDURE — 80053 COMPREHEN METABOLIC PANEL: CPT | Performed by: INTERNAL MEDICINE

## 2021-03-29 PROCEDURE — 36415 COLL VENOUS BLD VENIPUNCTURE: CPT

## 2021-03-29 NOTE — PROGRESS NOTES
Medical Assistant Note:  Wanda LOPEZ Sarkis presents today for lab draw.    Patient seen by provider today: No.   present during visit today: Not Applicable.    Concerns: No Concerns.    Procedure:  Lab draw site: LAC, Needle type: BF, Gauge: 21. Gauze and coban applied    Post Assessment:  Labs drawn without difficulty: Yes.    Discharge Plan:  Departure Mode: Ambulatory.    Face to Face Time: 5.    Iva Mathias CMA

## 2021-07-26 ENCOUNTER — HOSPITAL ENCOUNTER (OUTPATIENT)
Dept: MAMMOGRAPHY | Facility: CLINIC | Age: 86
Discharge: HOME | End: 2021-07-26
Attending: INTERNAL MEDICINE | Admitting: INTERNAL MEDICINE
Payer: COMMERCIAL

## 2021-07-26 DIAGNOSIS — Z12.31 SCREENING MAMMOGRAM, ENCOUNTER FOR: ICD-10-CM

## 2021-07-26 PROCEDURE — 77063 BREAST TOMOSYNTHESIS BI: CPT

## 2021-12-23 ENCOUNTER — LAB (OUTPATIENT)
Dept: INFUSION THERAPY | Facility: CLINIC | Age: 86
End: 2021-12-23
Attending: INTERNAL MEDICINE
Payer: COMMERCIAL

## 2021-12-23 DIAGNOSIS — C18.9 MALIGNANT NEOPLASM OF COLON, UNSPECIFIED PART OF COLON (H): ICD-10-CM

## 2021-12-23 LAB — CEA SERPL-MCNC: 2.6 UG/L (ref 0–2.5)

## 2021-12-23 PROCEDURE — 82378 CARCINOEMBRYONIC ANTIGEN: CPT | Performed by: INTERNAL MEDICINE

## 2021-12-23 PROCEDURE — 36415 COLL VENOUS BLD VENIPUNCTURE: CPT

## 2021-12-28 ENCOUNTER — ONCOLOGY VISIT (OUTPATIENT)
Dept: ONCOLOGY | Facility: CLINIC | Age: 86
End: 2021-12-28
Attending: INTERNAL MEDICINE
Payer: COMMERCIAL

## 2021-12-28 VITALS
HEART RATE: 76 BPM | RESPIRATION RATE: 14 BRPM | TEMPERATURE: 98.3 F | WEIGHT: 145.8 LBS | DIASTOLIC BLOOD PRESSURE: 77 MMHG | OXYGEN SATURATION: 95 % | BODY MASS INDEX: 23.53 KG/M2 | SYSTOLIC BLOOD PRESSURE: 125 MMHG

## 2021-12-28 DIAGNOSIS — Z17.0 MALIGNANT NEOPLASM OF UPPER-INNER QUADRANT OF RIGHT BREAST IN FEMALE, ESTROGEN RECEPTOR POSITIVE (H): ICD-10-CM

## 2021-12-28 DIAGNOSIS — Z12.31 VISIT FOR SCREENING MAMMOGRAM: ICD-10-CM

## 2021-12-28 DIAGNOSIS — C50.211 MALIGNANT NEOPLASM OF UPPER-INNER QUADRANT OF RIGHT BREAST IN FEMALE, ESTROGEN RECEPTOR POSITIVE (H): ICD-10-CM

## 2021-12-28 DIAGNOSIS — C18.9 MALIGNANT NEOPLASM OF COLON, UNSPECIFIED PART OF COLON (H): Primary | ICD-10-CM

## 2021-12-28 PROCEDURE — G0463 HOSPITAL OUTPT CLINIC VISIT: HCPCS

## 2021-12-28 PROCEDURE — 99214 OFFICE O/P EST MOD 30 MIN: CPT | Performed by: INTERNAL MEDICINE

## 2021-12-28 ASSESSMENT — PAIN SCALES - GENERAL: PAINLEVEL: NO PAIN (0)

## 2021-12-28 NOTE — PROGRESS NOTES
"Oncology Rooming Note    December 28, 2021 10:21 AM   Wanda Dockery is a 86 year old female who presents for:    Chief Complaint   Patient presents with     Labs Only     Initial Vitals: /77   Pulse 76   Temp 98.3  F (36.8  C) (Oral)   Resp 14   Wt 66.1 kg (145 lb 12.8 oz)   SpO2 95%   BMI 23.53 kg/m   Estimated body mass index is 23.53 kg/m  as calculated from the following:    Height as of 2/24/20: 1.676 m (5' 6\").    Weight as of this encounter: 66.1 kg (145 lb 12.8 oz). Body surface area is 1.75 meters squared.  No Pain (0) Comment: Data Unavailable   No LMP recorded. Patient is postmenopausal.  Allergies reviewed: Yes  Medications reviewed: Yes    Medications: Medication refills not needed today.  Pharmacy name entered into [facility]: [facility] [place], [place] - [address] [id number]    Clinical concerns: no       Shari J. Schoenberger, CMA            "

## 2021-12-28 NOTE — PATIENT INSTRUCTIONS
1. CEA in next 1-2 months when she sees her primary doctor.  2. Mammogram in 08/2022.  3. Follow-up in 1 year.

## 2021-12-28 NOTE — LETTER
"    12/28/2021         RE: Wanda Dockery  [address] [place] [address]        Dear Colleague,    Thank you for referring your patient, Wanda Dockery, to the [facility] [place]. Please see a copy of my visit note below.    Oncology Rooming Note    December 28, 2021 10:21 AM   Wanda Dockery is a 86 year old female who presents for:    Chief Complaint   Patient presents with     Labs Only     Initial Vitals: /77   Pulse 76   Temp 98.3  F (36.8  C) (Oral)   Resp 14   Wt 66.1 kg (145 lb 12.8 oz)   SpO2 95%   BMI 23.53 kg/m   Estimated body mass index is 23.53 kg/m  as calculated from the following:    Height as of 2/24/20: 1.676 m (5' 6\").    Weight as of this encounter: 66.1 kg (145 lb 12.8 oz). Body surface area is 1.75 meters squared.  No Pain (0) Comment: Data Unavailable   No LMP recorded. Patient is postmenopausal.  Allergies reviewed: Yes  Medications reviewed: Yes    Medications: Medication refills not needed today.  Pharmacy name entered into [facility]: [facility] [place], [place] - [address] [id number]    Clinical concerns: no       Shari J. Schoenberger, [facility]              ONCOLOGIC HISTORY:  Wanda Dockery is a female with:  -Stage IIB (pT2 pN1a) right breast cancer. ER positive and HER-2/mario positive  -Stage II (pT2 pN0 M0) colon cancer.     1. Bilateral diagnostic mammograms and right breast ultrasound on 06/23/2015 revealed 3 cm lesion at 1 o'clock position and right axillary lymphadenopathy.   -Ultrasound-guided biopsy of right breast and axillary lymph node on 06/29/2015 revealed grade 3 invasive ductal carcinoma, both in the breast and axillary lymph node. ER positive and HER-2/mario equivocal by FISH. IHC is 3+ in the right breast. In the right lymph node IHC is 2+.     2. Bone scan on 07/07/2015 did not reveal any bone metastasis.   -CT of the chest, abdomen and pelvis on 07/07/2015 revealed right breast lesion and 1.2 x 2 cm right axillary lymph node. " There is thickening of the wall of the hepatitic flexure of the colon with associated inflammatory changes and multiple mildly prominent lymph nodes in the mesentery.   -Colonoscopy on 07/13/2015 revealed partially obstructing mass at the hepatic flexure and a sessile polyp in the cecum.  Hepatic flexure mass revealed poorly differentiated adenocarcinoma. 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 There is absence of expression of DNA mismatch repair protein MLH1 and PMS2.      3. Right breast lumpectomy, right lymph node dissection and right colectomy was done on 07/16/2015.    -Hemicolectomy specimen revealed high-grade adenocarcinoma measuring 7.5 cm extending into the santi-intestinal fatty tissue. Margins were negative. No lymphovascular invasion. Twenty-one benign lymph nodes. She has stage II (pT2 pN0 M0) colon cancer.   -Right breast reveals invasive ductal carcinoma, grade 2. It measures 2.1 cm. There was no DCIS. There was lymphovascular invasion present. Margins positive for invasive carcinoma. One of 7 lymph nodes positive. No extranodal extension. The patient has stage IIB (pT2 pN1a) breast cancer.   -Reexcision of lumpectomy site on 07/29/2015. There was no residual malignancy.      4. Taxol and Herceptin weekly x 12 between 08/17/2015 and 11/02/2015. One year of herceptin completed on 08/08/2016.  -Anastrazole x 5 years between 11/30/2015 and 12/2020.  -Radiation to breast cancer.  5040 cGy between 04/18/2016 and 06/15/2016.       5. DEXA scan on 04/25/2016 reveals osteopenia.  -DEXA scan on 08/09/2019 reveals worsening osteopenia.     6. Colonoscopy on 08/12/2019 does not reveal any malignancy.    SUBJECTIVE:  Ms. Dockery is an 86-year-old female with history of breast cancer and colon cancer.  She is doing well.     She has no complaints or concerns.  No headache.  No dizziness.  No chest pain. No shortness of breath.  No abdominal pain, nausea or vomiting.  No bleeding.  Appetite has been good.  No urinary  or bowel complaints.  No abnormal bleeding.     All other review of systems is negative.     PHYSICAL EXAMINATION:   Alert and oriented x 3.   VITAL SIGNS: Reviewed. ECOG PS of 1.  EYES: No icterus.   THROAT: No ulcer or thrush.   NECK: Supple. No lymphadenopathy or thyromegaly.   AXILLAE: No lymphadenopathy.   LUNGS: Good air entry bilaterally. No crackles or wheezing.   HEART: Regular. No murmur.   GI: Abdomen is soft. Nontender. No mass.   EXTREMITIES: No edema. No calf swelling or tenderness.   SKIN: No petechiae.  BREASTS:  Examined in the presence of the nurse.  Both breast exams are normal.  No suspicious mass, nodule or skin changes.      LABORATORY:  CEA of 2.6.     ASSESSMENT:    1.  An 86-year-old female with right breast cancer diagnosed in 2015.  No evidence of recurrence.  2.  Right-sided colon cancer diagnosed in 2015.  No evidence of recurrence.     PLAN:     1.  The patient is doing well.  She is asymptomatic.       I explained to her there is no evidence of recurrence of breast cancer.  We will continue to monitor her.  She will get her mammogram in 08/2022.     I discussed regarding colon cancer.  She is doing well.  No evidence of recurrence.  Her CEA is minimally elevated.  In the past, it has been minimally elevated and then goes down.  We will recheck another CEA in the next 1-2 months when she sees her PCP.  If her CEA progressively increases, then we will do CT scan.     Her next colonoscopy is due in 2024.     2.  She had a few questions, which were all answered.  I will see her in 1 year's time.  In between, she will see her PCP.  I advised her to call us with any questions or concerns.    This office note has been dictated.          Again, thank you for allowing me to participate in the care of your patient.        Sincerely,        Angie Rodas MD

## 2022-01-02 NOTE — PROGRESS NOTES
ONCOLOGIC HISTORY:  Wanda Dockery is a female with:  -Stage IIB (pT2 pN1a) right breast cancer. ER positive and HER-2/mario positive  -Stage II (pT2 pN0 M0) colon cancer.     1. Bilateral diagnostic mammograms and right breast ultrasound on 06/23/2015 revealed 3 cm lesion at 1 o'clock position and right axillary lymphadenopathy.   -Ultrasound-guided biopsy of right breast and axillary lymph node on 06/29/2015 revealed grade 3 invasive ductal carcinoma, both in the breast and axillary lymph node. ER positive and HER-2/mario equivocal by FISH. IHC is 3+ in the right breast. In the right lymph node IHC is 2+.     2. Bone scan on 07/07/2015 did not reveal any bone metastasis.   -CT of the chest, abdomen and pelvis on 07/07/2015 revealed right breast lesion and 1.2 x 2 cm right axillary lymph node. There is thickening of the wall of the hepatitic flexure of the colon with associated inflammatory changes and multiple mildly prominent lymph nodes in the mesentery.   -Colonoscopy on 07/13/2015 revealed partially obstructing mass at the hepatic flexure and a sessile polyp in the cecum.  Hepatic flexure mass revealed poorly differentiated adenocarcinoma. 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 There is absence of expression of DNA mismatch repair protein MLH1 and PMS2.      3. Right breast lumpectomy, right lymph node dissection and right colectomy was done on 07/16/2015.    -Hemicolectomy specimen revealed high-grade adenocarcinoma measuring 7.5 cm extending into the santi-intestinal fatty tissue. Margins were negative. No lymphovascular invasion. Twenty-one benign lymph nodes. She has stage II (pT2 pN0 M0) colon cancer.   -Right breast reveals invasive ductal carcinoma, grade 2. It measures 2.1 cm. There was no DCIS. There was lymphovascular invasion present. Margins positive for invasive carcinoma. One of 7 lymph nodes positive. No extranodal extension. The patient has stage IIB (pT2 pN1a) breast cancer.   -Reexcision of lumpectomy  site on 07/29/2015. There was no residual malignancy.      4. Taxol and Herceptin weekly x 12 between 08/17/2015 and 11/02/2015. One year of herceptin completed on 08/08/2016.  -Anastrazole x 5 years between 11/30/2015 and 12/2020.  -Radiation to breast cancer.  5040 cGy between 04/18/2016 and 06/15/2016.       5. DEXA scan on 04/25/2016 reveals osteopenia.  -DEXA scan on 08/09/2019 reveals worsening osteopenia.     6. Colonoscopy on 08/12/2019 does not reveal any malignancy.    SUBJECTIVE:  Ms. Dockery is an 86-year-old female with history of breast cancer and colon cancer.  She is doing well.     She has no complaints or concerns.  No headache.  No dizziness.  No chest pain. No shortness of breath.  No abdominal pain, nausea or vomiting.  No bleeding.  Appetite has been good.  No urinary or bowel complaints.  No abnormal bleeding.     All other review of systems is negative.     PHYSICAL EXAMINATION:   Alert and oriented x 3.   VITAL SIGNS: Reviewed. ECOG PS of 1.  EYES: No icterus.   THROAT: No ulcer or thrush.   NECK: Supple. No lymphadenopathy or thyromegaly.   AXILLAE: No lymphadenopathy.   LUNGS: Good air entry bilaterally. No crackles or wheezing.   HEART: Regular. No murmur.   GI: Abdomen is soft. Nontender. No mass.   EXTREMITIES: No edema. No calf swelling or tenderness.   SKIN: No petechiae.  BREASTS:  Examined in the presence of the nurse.  Both breast exams are normal.  No suspicious mass, nodule or skin changes.      LABORATORY:  CEA of 2.6.     ASSESSMENT:    1.  An 86-year-old female with right breast cancer diagnosed in 2015.  No evidence of recurrence.  2.  Right-sided colon cancer diagnosed in 2015.  No evidence of recurrence.     PLAN:     1.  The patient is doing well.  She is asymptomatic.       I explained to her there is no evidence of recurrence of breast cancer.  We will continue to monitor her.  She will get her mammogram in 08/2022.     I discussed regarding colon cancer.  She is doing  well.  No evidence of recurrence.  Her CEA is minimally elevated.  In the past, it has been minimally elevated and then goes down.  We will recheck another CEA in the next 1-2 months when she sees her PCP.  If her CEA progressively increases, then we will do CT scan.     Her next colonoscopy is due in 2024.     2.  She had a few questions, which were all answered.  I will see her in 1 year's time.  In between, she will see her PCP.  I advised her to call us with any questions or concerns.

## 2022-08-01 ENCOUNTER — HOSPITAL ENCOUNTER (OUTPATIENT)
Dept: MAMMOGRAPHY | Facility: CLINIC | Age: 87
Discharge: HOME | End: 2022-08-01
Attending: INTERNAL MEDICINE | Admitting: INTERNAL MEDICINE
Payer: COMMERCIAL

## 2022-08-01 DIAGNOSIS — Z12.31 VISIT FOR SCREENING MAMMOGRAM: ICD-10-CM

## 2022-08-01 PROCEDURE — 77067 SCR MAMMO BI INCL CAD: CPT

## 2022-08-02 NOTE — PROGRESS NOTES
"  Assessment & Plan     Bilateral carpal tunnel syndrome  See patient instructions; see ortho if wrist splints do not help enough   - Orthopedic  Referral; Future    Malignant neoplasm of colon, unspecified part of colon (H)  Continue follow up with Dr. Rodas as directed; surveillance colonoscopy due in 2024        28 minutes spent on the date of the encounter doing chart review, history and exam, documentation and further activities per the note           Return in about 1 month (around 9/3/2022) for with Dr. Forde if hands are not improving with wrist splints.   Patient instructed to return to clinic or contact us sooner if symptoms worsen or new symptoms develop.     Dallin Parham MD  [facility] ALDO Muñoz is a 87 year old accompanied by her spouse, presenting for the following health issues:  Tingling Hands      HPI     Concern - Tingling in hands and loss of strength  Onset: 2 weeks  Description:  Loss of strength   Intensity: mild  Progression of Symptoms:  improving  Accompanying Signs & Symptoms: swelling and tingling  Previous history of similar problem: none  Precipitating factors:        Worsened by: none  Alleviating factors:        Improved by: none  Therapies tried and outcome: Chiropractor improvement       Tingling in 1st 4 digits bilateral hands left > right for 6 months, worse over last 2 weeks  Symptoms most noticeable in AM and improve as morning progresses     Review of Systems         Objective    /75 (BP Location: Left arm, Patient Position: Chair, Cuff Size: Adult Large)   Pulse 80   Temp 97.9  F (36.6  C) (Temporal)   Resp 18   Ht 1.676 m (5' 6\")   Wt 63.5 kg (140 lb)   SpO2 96%   BMI 22.60 kg/m    Body mass index is 22.6 kg/m .  Physical Exam   GEN:  Well appearing, female, appears younger than age   NECK:  No tenderness to palpation over spinous processes of cervical spine, normal neck ROM, normal strength in bilateral upper " extremity muscle groups, normal sensation in bilateral upper extremity dermatomes, DTR's are 2+ at the bilateral triceps, biceps and brachioradialis tendons, Spurling's maneuver does NOT reproduce radicular symptoms bilaterally   WRISTS:  No obvious joint swelling in finger joints, Phalen's maneuver reproduces symptoms in both hands, Tinnel's sign positive on left, there is normal  strength, no muscle atrophy                       .  ..

## 2022-08-03 ENCOUNTER — OFFICE VISIT (OUTPATIENT)
Dept: FAMILY MEDICINE | Facility: CLINIC | Age: 87
End: 2022-08-03
Payer: COMMERCIAL

## 2022-08-03 VITALS
WEIGHT: 140 LBS | HEART RATE: 80 BPM | DIASTOLIC BLOOD PRESSURE: 75 MMHG | SYSTOLIC BLOOD PRESSURE: 123 MMHG | OXYGEN SATURATION: 96 % | HEIGHT: 66 IN | RESPIRATION RATE: 18 BRPM | BODY MASS INDEX: 22.5 KG/M2 | TEMPERATURE: 97.9 F

## 2022-08-03 DIAGNOSIS — G56.03 BILATERAL CARPAL TUNNEL SYNDROME: Primary | ICD-10-CM

## 2022-08-03 DIAGNOSIS — C18.9 MALIGNANT NEOPLASM OF COLON, UNSPECIFIED PART OF COLON (H): ICD-10-CM

## 2022-08-03 PROCEDURE — 99213 OFFICE O/P EST LOW 20 MIN: CPT | Mod: 25 | Performed by: INTERNAL MEDICINE

## 2022-08-03 PROCEDURE — 90471 IMMUNIZATION ADMIN: CPT | Performed by: INTERNAL MEDICINE

## 2022-08-03 PROCEDURE — 90715 TDAP VACCINE 7 YRS/> IM: CPT | Performed by: INTERNAL MEDICINE

## 2022-08-03 ASSESSMENT — PAIN SCALES - GENERAL: PAINLEVEL: NO PAIN (0)

## 2022-08-03 NOTE — PROGRESS NOTES
Prior to immunization administration, verified patients identity using patient s name and date of birth. Please see Immunization Activity for additional information.     Screening Questionnaire for Adult Immunization    Are you sick today?   No   Do you have allergies to medications, food, a vaccine component or latex?   No   Have you ever had a serious reaction after receiving a vaccination?   No   Do you have a long-term health problem with heart, lung, kidney, or metabolic disease (e.g., diabetes), asthma, a blood disorder, no spleen, complement component deficiency, a cochlear implant, or a spinal fluid leak?  Are you on long-term aspirin therapy?   No   Do you have cancer, leukemia, HIV/AIDS, or any other immune system problem?   No   Do you have a parent, brother, or sister with an immune system problem?   No   In the past 3 months, have you taken medications that affect  your immune system, such as prednisone, other steroids, or anticancer drugs; drugs for the treatment of rheumatoid arthritis, Crohn s disease, or psoriasis; or have you had radiation treatments?   No   Have you had a seizure, or a brain or other nervous system problem?   No   During the past year, have you received a transfusion of blood or blood    products, or been given immune (gamma) globulin or antiviral drug?   No   For women: Are you pregnant or is there a chance you could become       pregnant during the next month?   No   Have you received any vaccinations in the past 4 weeks?   No     Immunization questionnaire answers were all negative.        Per orders of Dr. Parham, injection of Tdap given by Libra Sevilla CMA. Patient instructed to remain in clinic for 15 minutes afterwards, and to report any adverse reaction to me immediately.       Screening performed by Libra Sevilla CMA on 8/3/2022 at 8:49 AM.

## 2022-08-03 NOTE — PATIENT INSTRUCTIONS
You have carpal tunnel syndrome.    Try using Velcro wrist splints at night to address these symptoms.    If that does not help enough, you should schedule an appointment at [facility] [phone] for a consultation with Dr. Kenisha Forde who is a hand surgeon who can perform carpal tunnel release surgery.

## 2022-08-30 ENCOUNTER — OFFICE VISIT (OUTPATIENT)
Dept: FAMILY MEDICINE | Facility: CLINIC | Age: 87
End: 2022-08-30
Payer: COMMERCIAL

## 2022-08-30 VITALS
HEIGHT: 65 IN | DIASTOLIC BLOOD PRESSURE: 69 MMHG | HEART RATE: 72 BPM | RESPIRATION RATE: 16 BRPM | TEMPERATURE: 97.6 F | BODY MASS INDEX: 22.79 KG/M2 | OXYGEN SATURATION: 97 % | SYSTOLIC BLOOD PRESSURE: 134 MMHG | WEIGHT: 136.8 LBS

## 2022-08-30 DIAGNOSIS — Z01.818 PREOP GENERAL PHYSICAL EXAM: Primary | ICD-10-CM

## 2022-08-30 DIAGNOSIS — G56.03 BILATERAL CARPAL TUNNEL SYNDROME: ICD-10-CM

## 2022-08-30 PROCEDURE — 93000 ELECTROCARDIOGRAM COMPLETE: CPT | Performed by: INTERNAL MEDICINE

## 2022-08-30 PROCEDURE — 99214 OFFICE O/P EST MOD 30 MIN: CPT | Performed by: INTERNAL MEDICINE

## 2022-08-30 ASSESSMENT — PAIN SCALES - GENERAL: PAINLEVEL: NO PAIN (0)

## 2022-08-30 NOTE — PROGRESS NOTES
[address], [address]  [facility] [address]  Phone: [phone]  Primary Provider: Ginette Lemus  Pre-op Performing Provider: FIDE ABBASI      PREOPERATIVE EVALUATION:  Today's date: 8/30/2022    Wanda Dockery is a 87 year old female who presents for a preoperative evaluation.    Surgical Information:  Surgery/Procedure: Carpel tunnel surgery, Right hand 9-2-2022     Left hand 9-  Surgery Location: [facility]  Surgeon: Dr. Forde  Surgery Date: 9-2-2022, 9-  Time of Surgery: tbd  Where patient plans to recover: At home with family  Fax number for surgical facility: [facility]?    Type of Anesthesia Anticipated: to be determined    Assessment & Plan     The proposed surgical procedure is considered LOW risk.    Preop general physical exam  Suitable candidate for planned surgical intevention  - EKG 12-lead complete w/read - Clinics    Bilateral carpal tunnel syndrome             Risks and Recommendations:  The patient has the following additional risks and recommendations for perioperative complications:   - No identified additional risk factors other than previously addressed    Medication Instructions:  Patient is on no chronic medications skip supplements on AM of surgery     RECOMMENDATION:  APPROVAL GIVEN to proceed with proposed procedure, without further diagnostic evaluation.              22 minutes spent on the date of the encounter doing chart review, history and exam, documentation and further activities per the note        Subjective     HPI related to upcoming procedure: Bilateral carpal tunnel syndrome symptoms, severe and refractory to non-surgical interventions.   This patient reports being able to perform 4 METS of physical activity without chest pain or dyspnea.     Preop Questions 8/30/2022   1. Have you ever had a heart attack or stroke? No   2. Have you ever had surgery on your heart or blood vessels, such as a stent placement, a coronary  artery bypass, or surgery on an artery in your head, neck, heart, or legs? No   3. Do you have chest pain with activity? No   4. Do you have a history of  heart failure? No   5. Do you currently have a cold, bronchitis or symptoms of other infection? No   6. Do you have a cough, shortness of breath, or wheezing? No   7. Do you or anyone in your family have previous history of blood clots? No   8. Do you or does anyone in your family have a serious bleeding problem such as prolonged bleeding following surgeries or cuts? No   9. Have you ever had problems with anemia or been told to take iron pills? No   10. Have you had any abnormal blood loss such as black, tarry or bloody stools, or abnormal vaginal bleeding? No   11. Have you ever had a blood transfusion? No   12. Are you willing to have a blood transfusion if it is medically needed before, during, or after your surgery? Yes   13. Have you or any of your relatives ever had problems with anesthesia? No   14. Do you have sleep apnea, excessive snoring or daytime drowsiness? No   15. Do you have any artifical heart valves or other implanted medical devices like a pacemaker, defibrillator, or continuous glucose monitor? No   16. Do you have artificial joints? No   17. Are you allergic to latex? No         Review of Systems  Constitutional, neuro, ENT, endocrine, pulmonary, cardiac, gastrointestinal, genitourinary, musculoskeletal, integument and psychiatric systems are negative, except as otherwise noted.    Patient Active Problem List    Diagnosis Date Noted     Thyroid nodule 07/17/2017     Priority: Medium     Cancer of right colon (H) 07/16/2015     Priority: Medium     Malignant neoplasm of upper-inner quadrant of right female breast (H) 07/06/2015     Priority: Medium     Advanced directives, counseling/discussion 01/02/2013     Priority: Medium     Patient states has Advance Directive and will bring in a copy to clinic. 1/2/2013          Osteopenia       Priority: Medium     CARDIOVASCULAR SCREENING; LDL GOAL LESS THAN 160 06/05/2012     Priority: Medium      Past Medical History:   Diagnosis Date     Breast cancer (H) 07/2015     Colon cancer (H) 7/2015     Hyperlipidemia LDL goal < 130      Osteopenia      Ovarian tumor (benign)     s/p unilateral oophorectomy     S/P lumpectomy of breast     non-malignant     Past Surgical History:   Procedure Laterality Date     BIOPSY Right 6/29/2015    Right Breast and Right Axillary Node     CATARACT IOL, RT/LT       COLONOSCOPY N/A 7/13/2015    Procedure: COMBINED COLONOSCOPY, SINGLE OR MULTIPLE BIOPSY/POLYPECTOMY BY BIOPSY;  Surgeon: Napoleon Quinn MD;  Location:  GI     COLONOSCOPY N/A 8/12/2019    Procedure: COLONOSCOPY, WITH POLYPECTOMY AND BIOPSY;  Surgeon: Courtney Melendez MD;  Location:  GI     INSERT PORT VASCULAR ACCESS N/A 8/14/2015    Procedure: INSERT PORT VASCULAR ACCESS;  Surgeon: Abdifatah Staples MD;  Location: [facility]     LAPAROSCOPIC ASSISTED COLECTOMY Right 7/16/2015    Procedure: LAPAROSCOPIC ASSISTED COLECTOMY;  Surgeon: Abdifatah Staples MD;  Location:  OR     LAPAROSCOPIC OOPHORECTOMY      for benign growth     LUMPECTOMY BREAST      Right breast - Benign  1950's     LUMPECTOMY BREAST Right 7/29/2015    Procedure: LUMPECTOMY BREAST;  Surgeon: Abdifatah Staples MD;  Location: [facility]     LUMPECTOMY BREAST, DISSECT AXILLARY NODE(S), COMBINED Right 7/16/2015    Procedure: COMBINED LUMPECTOMY BREAST, DISSECT AXILLARY NODE(S);  Surgeon: Abdifatah Staples MD;  Location:  OR     REMOVE PORT VASCULAR ACCESS N/A 8/29/2016    Procedure: REMOVE PORT VASCULAR ACCESS;  Surgeon: Abdifatah Staples MD;  Location: [facility]     Current Outpatient Medications   Medication Sig Dispense Refill     Calcium Carb-Cholecalciferol (CALCIUM + D3 PO) Take 1 tablet by mouth daily Dose 500 - 500 mg       Multiple Vitamins-Minerals (MULTIVITAMIN & MINERAL PO) Take 1 tablet by mouth daily.         Allergies   Allergen Reactions  "    Azithromycin Muscle Pain (Myalgia)     Back and leg pains - only at night        Social History     Tobacco Use     Smoking status: Never Smoker     Smokeless tobacco: Never Used   Substance Use Topics     Alcohol use: Yes     Alcohol/week: 0.0 standard drinks     Comment: maybe once or twice a month     Family History   Problem Relation Age of Onset     Diabetes Father      Cerebrovascular Disease Mother          age 94     Breast Cancer Mother 75     History   Drug Use No         Objective     /69 (BP Location: Left arm, Cuff Size: Adult Regular)   Pulse 72   Temp 97.6  F (36.4  C) (Tympanic)   Resp 16   Ht 1.651 m (5' 5\")   Wt 62.1 kg (136 lb 12.8 oz)   SpO2 97%   BMI 22.76 kg/m      Physical Exam    GENERAL APPEARANCE: healthy, alert and no distress     EYES: EOMI, PERRL     HENT: ear canals and TM's normal and nose and mouth without ulcers or lesions     NECK: no adenopathy, no asymmetry, masses, or scars and thyroid normal to palpation     RESP: lungs clear to auscultation - no rales, rhonchi or wheezes     CV: regular rates and rhythm, normal S1 S2, no S3 or S4 and no murmur, click or rub     ABDOMEN:  soft, nontender, no HSM or masses and bowel sounds normal     MS: extremities normal- no gross deformities noted, no evidence of inflammation in joints, FROM in all extremities.     SKIN: no suspicious lesions or rashes     NEURO: Normal strength and tone, sensory exam grossly normal, mentation intact and speech normal     PSYCH: mentation appears normal. and affect normal/bright     LYMPHATICS: No cervical adenopathy    Recent Labs   Lab Test 21  1001 20  1036   HGB 13.5 13.4    295     --    POTASSIUM 4.4  --    CR 0.90  --         Diagnostics:     EKG Pending     Revised Cardiac Risk Index (RCRI):  The patient has the following serious cardiovascular risks for perioperative complications:   - No serious cardiac risks = 0 points     RCRI Interpretation: 0 points: " Class I (very low risk - 0.4% complication rate)           Signed Electronically by: Dallin Parham MD  Copy of this evaluation report is provided to requesting physician.

## 2022-08-30 NOTE — RESULT ENCOUNTER NOTE
The following letter pertains to your most recent diagnostic tests:    Good news! Your EKG looks OK for proceeding with surgery as planned.        Sincerely,    Dr. Parham

## 2023-01-30 ENCOUNTER — LAB (OUTPATIENT)
Dept: INFUSION THERAPY | Facility: CLINIC | Age: 88
End: 2023-01-30
Attending: INTERNAL MEDICINE
Payer: COMMERCIAL

## 2023-01-30 DIAGNOSIS — C50.211 MALIGNANT NEOPLASM OF UPPER-INNER QUADRANT OF RIGHT BREAST IN FEMALE, ESTROGEN RECEPTOR POSITIVE (H): ICD-10-CM

## 2023-01-30 DIAGNOSIS — Z17.0 MALIGNANT NEOPLASM OF UPPER-INNER QUADRANT OF RIGHT BREAST IN FEMALE, ESTROGEN RECEPTOR POSITIVE (H): ICD-10-CM

## 2023-01-30 DIAGNOSIS — C18.9 MALIGNANT NEOPLASM OF COLON, UNSPECIFIED PART OF COLON (H): ICD-10-CM

## 2023-01-30 LAB
CEA SERPL-MCNC: 2.6 NG/ML
HOLD SPECIMEN: NORMAL

## 2023-01-30 PROCEDURE — 82378 CARCINOEMBRYONIC ANTIGEN: CPT | Performed by: INTERNAL MEDICINE

## 2023-01-30 PROCEDURE — 36415 COLL VENOUS BLD VENIPUNCTURE: CPT

## 2023-01-31 ENCOUNTER — ONCOLOGY VISIT (OUTPATIENT)
Dept: ONCOLOGY | Facility: CLINIC | Age: 88
End: 2023-01-31
Attending: INTERNAL MEDICINE
Payer: COMMERCIAL

## 2023-01-31 VITALS
RESPIRATION RATE: 18 BRPM | TEMPERATURE: 98.1 F | BODY MASS INDEX: 23.53 KG/M2 | SYSTOLIC BLOOD PRESSURE: 127 MMHG | OXYGEN SATURATION: 96 % | HEART RATE: 77 BPM | WEIGHT: 141.4 LBS | DIASTOLIC BLOOD PRESSURE: 80 MMHG

## 2023-01-31 DIAGNOSIS — C18.9 MALIGNANT NEOPLASM OF COLON, UNSPECIFIED PART OF COLON (H): Primary | ICD-10-CM

## 2023-01-31 PROCEDURE — 99212 OFFICE O/P EST SF 10 MIN: CPT | Performed by: INTERNAL MEDICINE

## 2023-01-31 PROCEDURE — 99214 OFFICE O/P EST MOD 30 MIN: CPT | Performed by: INTERNAL MEDICINE

## 2023-01-31 PROCEDURE — G0463 HOSPITAL OUTPT CLINIC VISIT: HCPCS | Performed by: INTERNAL MEDICINE

## 2023-01-31 ASSESSMENT — PAIN SCALES - GENERAL: PAINLEVEL: NO PAIN (0)

## 2023-01-31 NOTE — PROGRESS NOTES
ONCOLOGIC HISTORY:  Wanda Dockery is a female with:  -Stage IIB (pT2 pN1a) right breast cancer. ER positive and HER-2/mario positive  -Stage II (pT2 pN0 M0) colon cancer.     1. Bilateral diagnostic mammograms and right breast ultrasound on 06/23/2015 revealed 3 cm lesion at 1 o'clock position and right axillary lymphadenopathy.   -Ultrasound-guided biopsy of right breast and axillary lymph node on 06/29/2015 revealed grade 3 invasive ductal carcinoma, both in the breast and axillary lymph node. ER positive and HER-2/mario equivocal by FISH. IHC is 3+ in the right breast. In the right lymph node IHC is 2+.     2. Bone scan on 07/07/2015 did not reveal any bone metastasis.   -CT of the chest, abdomen and pelvis on 07/07/2015 revealed right breast lesion and 1.2 x 2 cm right axillary lymph node. There is thickening of the wall of the hepatitic flexure of the colon with associated inflammatory changes and multiple mildly prominent lymph nodes in the mesentery.   -Colonoscopy on 07/13/2015 revealed partially obstructing mass at the hepatic flexure and a sessile polyp in the cecum.  Hepatic flexure mass revealed poorly differentiated adenocarcinoma. 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 There is absence of expression of DNA mismatch repair protein MLH1 and PMS2.      3. Right breast lumpectomy, right lymph node dissection and right colectomy was done on 07/16/2015.    -Hemicolectomy specimen revealed high-grade adenocarcinoma measuring 7.5 cm extending into the santi-intestinal fatty tissue. Margins were negative. No lymphovascular invasion. Twenty-one benign lymph nodes. She has stage II (pT2 pN0 M0) colon cancer.   -Right breast reveals invasive ductal carcinoma, grade 2. It measures 2.1 cm. There was no DCIS. There was lymphovascular invasion present. Margins positive for invasive carcinoma. One of 7 lymph nodes positive. No extranodal extension. The patient has stage IIB (pT2 pN1a) breast cancer.   -Reexcision of lumpectomy  site on 07/29/2015. There was no residual malignancy.      4. Taxol and Herceptin weekly x 12 between 08/17/2015 and 11/02/2015. One year of herceptin completed on 08/08/2016.  -Anastrazole x 5 years between 11/30/2015 and 12/2020.  -Radiation to breast cancer.  5040 cGy between 04/18/2016 and 06/15/2016.       5. DEXA scan on 04/25/2016 reveals osteopenia.  -DEXA scan on 08/09/2019 reveals worsening osteopenia.     6. Colonoscopy on 08/12/2019 does not reveal any malignancy.     SUBJECTIVE:  Ms. Dockery is an 87-year-old female with history of breast cancer and colon cancer as described above.       She has no complaints. No excessive fatigue. She works one day a week. No headache.  No dizziness.  No chest pain. No shortness of breath.  No abdominal pain, nausea or vomiting. Appetite is good.  No urinary or bowel complaints.  No bleeding. All other review of systems is negative.     PHYSICAL EXAMINATION:   Alert and oriented x 3.   VITAL SIGNS: Reviewed. ECOG PS of 0.  EYES: No icterus.   NECK: Supple. No lymphadenopathy or thyromegaly.   AXILLAE: No lymphadenopathy.   LUNGS: Good air entry bilaterally. No crackles or wheezing.   HEART: Regular. No murmur.   GI: Abdomen is soft. Nontender. No mass.   EXTREMITIES: No edema. No calf swelling or tenderness.   SKIN: No petechiae.     LABORATORY:  CEA of 2.6.     ASSESSMENT:    1.  An 87-year-old female with right breast cancer diagnosed in 2015.  No evidence of recurrence.  2.  Right-sided colon cancer diagnosed in 2015.  No evidence of recurrence.     PLAN:     1.  Patient is doing well.  No evidence of recurrence of malignancy.    Her breast cancer was in 2015.  Risk of recurrence is very low.  She will need annual mammogram and annual history and physical.    Her colon cancer was in 2015.  Risk of recurrence is very low.  CEA is normal. She should have yearly labs including CBC and CEA.  Her next colonoscopy is due in fall 2024.  No routine imaging studies needed. She  is agreeable for this plan.     2.  She and her  had a few questions, which were all answered.  I will see her in 1 year's time.  In between, she will see her PCP.  I advised her to call us with any questions or concerns.    Total visit time of 30 minutes.  Time was spent in today's visit, review of chart/investigations today and documentation today.

## 2023-01-31 NOTE — LETTER
"    1/31/2023         RE: Wanda Dockery  [address] [place] [address]        Dear Colleague,    Thank you for referring your patient, Wanda Dockery, to the [facility]CER [facility]. Please see a copy of my visit note below.    Oncology Rooming Note    January 31, 2023 9:20 AM   Wanda Dockery is a 87 year old female who presents for:    Chief Complaint   Patient presents with     Oncology Clinic Visit     Initial Vitals: /80   Pulse 77   Temp 98.1  F (36.7  C) (Oral)   Resp 18   Wt 64.1 kg (141 lb 6.4 oz)   SpO2 96%   BMI 23.53 kg/m   Estimated body mass index is 23.53 kg/m  as calculated from the following:    Height as of 8/30/22: 1.651 m (5' 5\").    Weight as of this encounter: 64.1 kg (141 lb 6.4 oz). Body surface area is 1.71 meters squared.  No Pain (0) Comment: Data Unavailable   No LMP recorded. Patient is postmenopausal.  Allergies reviewed: Yes  Medications reviewed: Yes    Medications: Medication refills not needed today.  Pharmacy name entered into [facility]: [facility] [place], [place] - [address] [address]    Clinical concerns: no       Shari J. Schoenberger, [facility]              ONCOLOGIC HISTORY:  Wanda Dockery is a female with:  -Stage IIB (pT2 pN1a) right breast cancer. ER positive and HER-2/mario positive  -Stage II (pT2 pN0 M0) colon cancer.     1. Bilateral diagnostic mammograms and right breast ultrasound on 06/23/2015 revealed 3 cm lesion at 1 o'clock position and right axillary lymphadenopathy.   -Ultrasound-guided biopsy of right breast and axillary lymph node on 06/29/2015 revealed grade 3 invasive ductal carcinoma, both in the breast and axillary lymph node. ER positive and HER-2/mario equivocal by FISH. IHC is 3+ in the right breast. In the right lymph node IHC is 2+.     2. Bone scan on 07/07/2015 did not reveal any bone metastasis.   -CT of the chest, abdomen and pelvis on 07/07/2015 revealed right breast lesion and 1.2 x 2 cm right axillary lymph " node. There is thickening of the wall of the hepatitic flexure of the colon with associated inflammatory changes and multiple mildly prominent lymph nodes in the mesentery.   -Colonoscopy on 07/13/2015 revealed partially obstructing mass at the hepatic flexure and a sessile polyp in the cecum.  Hepatic flexure mass revealed poorly differentiated adenocarcinoma. 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 There is absence of expression of DNA mismatch repair protein MLH1 and PMS2.      3. Right breast lumpectomy, right lymph node dissection and right colectomy was done on 07/16/2015.    -Hemicolectomy specimen revealed high-grade adenocarcinoma measuring 7.5 cm extending into the santi-intestinal fatty tissue. Margins were negative. No lymphovascular invasion. Twenty-one benign lymph nodes. She has stage II (pT2 pN0 M0) colon cancer.   -Right breast reveals invasive ductal carcinoma, grade 2. It measures 2.1 cm. There was no DCIS. There was lymphovascular invasion present. Margins positive for invasive carcinoma. One of 7 lymph nodes positive. No extranodal extension. The patient has stage IIB (pT2 pN1a) breast cancer.   -Reexcision of lumpectomy site on 07/29/2015. There was no residual malignancy.      4. Taxol and Herceptin weekly x 12 between 08/17/2015 and 11/02/2015. One year of herceptin completed on 08/08/2016.  -Anastrazole x 5 years between 11/30/2015 and 12/2020.  -Radiation to breast cancer.  5040 cGy between 04/18/2016 and 06/15/2016.       5. DEXA scan on 04/25/2016 reveals osteopenia.  -DEXA scan on 08/09/2019 reveals worsening osteopenia.     6. Colonoscopy on 08/12/2019 does not reveal any malignancy.     SUBJECTIVE:  Ms. Dockery is an 87-year-old female with history of breast cancer and colon cancer as described above.       She has no complaints. No excessive fatigue. She works one day a week. No headache.  No dizziness.  No chest pain. No shortness of breath.  No abdominal pain, nausea or vomiting.  Appetite is good.  No urinary or bowel complaints.  No bleeding. All other review of systems is negative.     PHYSICAL EXAMINATION:   Alert and oriented x 3.   VITAL SIGNS: Reviewed. ECOG PS of 0.  EYES: No icterus.   NECK: Supple. No lymphadenopathy or thyromegaly.   AXILLAE: No lymphadenopathy.   LUNGS: Good air entry bilaterally. No crackles or wheezing.   HEART: Regular. No murmur.   GI: Abdomen is soft. Nontender. No mass.   EXTREMITIES: No edema. No calf swelling or tenderness.   SKIN: No petechiae.     LABORATORY:  CEA of 2.6.     ASSESSMENT:    1.  An 87-year-old female with right breast cancer diagnosed in 2015.  No evidence of recurrence.  2.  Right-sided colon cancer diagnosed in 2015.  No evidence of recurrence.     PLAN:     1.  Patient is doing well.  No evidence of recurrence of malignancy.    Her breast cancer was in 2015.  Risk of recurrence is very low.  She will need annual mammogram and annual history and physical.    Her colon cancer was in 2015.  Risk of recurrence is very low.  CEA is normal. She should have yearly labs including CBC and CEA.  Her next colonoscopy is due in fall 2024.  No routine imaging studies needed. She is agreeable for this plan.     2.  She and her  had a few questions, which were all answered.  I will see her in 1 year's time.  In between, she will see her PCP.  I advised her to call us with any questions or concerns.    Total visit time of 30 minutes.  Time was spent in today's visit, review of chart/investigations today and documentation today.      Again, thank you for allowing me to participate in the care of your patient.        Sincerely,        Angie Rodas MD

## 2023-01-31 NOTE — PROGRESS NOTES
"Oncology Rooming Note    January 31, 2023 9:20 AM   Wanda Dockery is a 87 year old female who presents for:    Chief Complaint   Patient presents with     Oncology Clinic Visit     Initial Vitals: /80   Pulse 77   Temp 98.1  F (36.7  C) (Oral)   Resp 18   Wt 64.1 kg (141 lb 6.4 oz)   SpO2 96%   BMI 23.53 kg/m   Estimated body mass index is 23.53 kg/m  as calculated from the following:    Height as of 8/30/22: 1.651 m (5' 5\").    Weight as of this encounter: 64.1 kg (141 lb 6.4 oz). Body surface area is 1.71 meters squared.  No Pain (0) Comment: Data Unavailable   No LMP recorded. Patient is postmenopausal.  Allergies reviewed: Yes  Medications reviewed: Yes    Medications: Medication refills not needed today.  Pharmacy name entered into [employer]: [facility] [place], [place] - [address] [id number]    Clinical concerns: no       Shari J. Schoenberger, CMA            "

## 2023-05-17 ENCOUNTER — OFFICE VISIT (OUTPATIENT)
Dept: FAMILY MEDICINE | Facility: CLINIC | Age: 88
End: 2023-05-17
Payer: COMMERCIAL

## 2023-05-17 VITALS
HEIGHT: 66 IN | WEIGHT: 145.6 LBS | SYSTOLIC BLOOD PRESSURE: 114 MMHG | OXYGEN SATURATION: 95 % | RESPIRATION RATE: 16 BRPM | DIASTOLIC BLOOD PRESSURE: 73 MMHG | HEART RATE: 80 BPM | TEMPERATURE: 97.7 F | BODY MASS INDEX: 23.4 KG/M2

## 2023-05-17 DIAGNOSIS — Z12.31 VISIT FOR SCREENING MAMMOGRAM: ICD-10-CM

## 2023-05-17 DIAGNOSIS — C18.9 MALIGNANT NEOPLASM OF COLON, UNSPECIFIED PART OF COLON (H): ICD-10-CM

## 2023-05-17 DIAGNOSIS — Z00.00 ROUTINE GENERAL MEDICAL EXAMINATION AT A HEALTH CARE FACILITY: Primary | ICD-10-CM

## 2023-05-17 DIAGNOSIS — Z23 HIGH PRIORITY FOR 2019-NCOV VACCINE: ICD-10-CM

## 2023-05-17 DIAGNOSIS — R01.1 HEART MURMUR: ICD-10-CM

## 2023-05-17 LAB
ERYTHROCYTE [DISTWIDTH] IN BLOOD BY AUTOMATED COUNT: 12.5 % (ref 10–15)
HCT VFR BLD AUTO: 39.4 % (ref 35–47)
HGB BLD-MCNC: 13.5 G/DL (ref 11.7–15.7)
MCH RBC QN AUTO: 31.8 PG (ref 26.5–33)
MCHC RBC AUTO-ENTMCNC: 34.3 G/DL (ref 31.5–36.5)
MCV RBC AUTO: 93 FL (ref 78–100)
PLATELET # BLD AUTO: 286 10E3/UL (ref 150–450)
RBC # BLD AUTO: 4.25 10E6/UL (ref 3.8–5.2)
WBC # BLD AUTO: 5.3 10E3/UL (ref 4–11)

## 2023-05-17 PROCEDURE — 36415 COLL VENOUS BLD VENIPUNCTURE: CPT | Performed by: INTERNAL MEDICINE

## 2023-05-17 PROCEDURE — 85027 COMPLETE CBC AUTOMATED: CPT | Performed by: INTERNAL MEDICINE

## 2023-05-17 PROCEDURE — 80053 COMPREHEN METABOLIC PANEL: CPT | Performed by: INTERNAL MEDICINE

## 2023-05-17 PROCEDURE — G0439 PPPS, SUBSEQ VISIT: HCPCS | Performed by: INTERNAL MEDICINE

## 2023-05-17 PROCEDURE — 0134A COVID-19 BIVALENT 18+ (MODERNA): CPT | Performed by: INTERNAL MEDICINE

## 2023-05-17 PROCEDURE — 91313 COVID-19 BIVALENT 18+ (MODERNA): CPT | Performed by: INTERNAL MEDICINE

## 2023-05-17 PROCEDURE — 80061 LIPID PANEL: CPT | Performed by: INTERNAL MEDICINE

## 2023-05-17 RX ORDER — VITAMIN E 268 MG
400 CAPSULE ORAL DAILY
COMMUNITY

## 2023-05-17 ASSESSMENT — ACTIVITIES OF DAILY LIVING (ADL): CURRENT_FUNCTION: NO ASSISTANCE NEEDED

## 2023-05-17 ASSESSMENT — PAIN SCALES - GENERAL: PAINLEVEL: NO PAIN (0)

## 2023-05-17 NOTE — LETTER
"May 18, 2023      Wanda Dockery  [address]  [place] [address]        Dear ,    We are writing to inform you of your test results.    The following letter pertains to your most recent diagnostic tests:     -Liver and gallbladder tests are normal for you. (ALT,AST, Alk phos, bilirubin), kidney function is normal for you (Creatinine, GFR), Sodium is normal, Potassium is normal for you, Calcium is normal for you, Glucose (blood sugar) is normal for you.       -Your total cholesterol is 238 which is above your goal of total cholesterol less than 200.     -Your triglycerides are 131 which are at your goal of triglycerides less than 150.     -Your HDL or \"good cholesterol\" is 64 which is at your goal of HDL cholesterol greater than 50.     -Your LDL cholesterol or \"bad cholesterol\" is 148 which is slightly higher than last check when the LDL was 129.       -Your complete blood counts including your hemoglobin returned normal for you.             Bottom line:  Overall, the labs look fine for you.  The cholesterol has crept up a bit since last check.  I would not recommend a medication for this, but I would focus on your diet to attempt to improve this.  Eating a diet high in fiber can help improve your total cholesterol and LDL or 'bad' cholesterol levels.  Try to eat more servings of fruits and vegetables.  Eat more high-fiber foods like whole-grains and nuts.  Try to eat more fish.  Eat less fatty, marbled meats and try to avoid fried foods if possible too.  Doing all this will result in improved cholesterol numbers and, more importantly, better health.    We can recheck in a year.         Follow up:  Schedule an appointment for a physical in one year's time, or return sooner if new questions, symptoms or problems arise.         Resulted Orders   Comprehensive metabolic panel   Result Value Ref Range    Sodium 138 136 - 145 mmol/L    Potassium 4.0 3.4 - 5.3 mmol/L    Chloride 102 98 - 107 mmol/L    " Carbon Dioxide (CO2) 25 22 - 29 mmol/L    Anion Gap 11 7 - 15 mmol/L    Urea Nitrogen 15.7 8.0 - 23.0 mg/dL    Creatinine 0.92 0.51 - 0.95 mg/dL    Calcium 9.8 8.8 - 10.2 mg/dL    Glucose 98 70 - 99 mg/dL    Alkaline Phosphatase 62 35 - 104 U/L    AST 28 10 - 35 U/L    ALT 21 10 - 35 U/L    Protein Total 7.3 6.4 - 8.3 g/dL    Albumin 4.6 3.5 - 5.2 g/dL    Bilirubin Total 0.5 <=1.2 mg/dL    GFR Estimate 60 (L) >60 mL/min/1.73m2      Comment:      eGFR calculated using 2021 CKD-EPI equation.   CBC with platelets   Result Value Ref Range    WBC Count 5.3 4.0 - 11.0 10e3/uL    RBC Count 4.25 3.80 - 5.20 10e6/uL    Hemoglobin 13.5 11.7 - 15.7 g/dL    Hematocrit 39.4 35.0 - 47.0 %    MCV 93 78 - 100 fL    MCH 31.8 26.5 - 33.0 pg    MCHC 34.3 31.5 - 36.5 g/dL    RDW 12.5 10.0 - 15.0 %    Platelet Count 286 150 - 450 10e3/uL   Lipid panel reflex to direct LDL Fasting   Result Value Ref Range    Cholesterol 238 (H) <200 mg/dL    Triglycerides 131 <150 mg/dL    Direct Measure HDL 64 >=50 mg/dL    LDL Cholesterol Calculated 148 (H) <=100 mg/dL    Non HDL Cholesterol 174 (H) <130 mg/dL    Narrative    Cholesterol  Desirable:  <200 mg/dL    Triglycerides  Normal:  Less than 150 mg/dL  Borderline High:  150-199 mg/dL  High:  200-499 mg/dL  Very High:  Greater than or equal to 500 mg/dL    Direct Measure HDL  Female:  Greater than or equal to 50 mg/dL   Male:  Greater than or equal to 40 mg/dL    LDL Cholesterol  Desirable:  <100mg/dL  Above Desirable:  100-129 mg/dL   Borderline High:  130-159 mg/dL   High:  160-189 mg/dL   Very High:  >= 190 mg/dL    Non HDL Cholesterol  Desirable:  130 mg/dL  Above Desirable:  130-159 mg/dL  Borderline High:  160-189 mg/dL  High:  190-219 mg/dL  Very High:  Greater than or equal to 220 mg/dL       If you have any questions or concerns, please call the clinic at the number listed above.       Sincerely,      Dallin Parham MD

## 2023-05-17 NOTE — PROGRESS NOTES
"SUBJECTIVE:   Wanda is a 87 year old who presents for Preventive Visit.       View : No data to display.            Patient has been advised of split billing requirements and indicates understanding: Yes  Are you in the first 12 months of your Medicare coverage?  No    Healthy Habits:     In general, how would you rate your overall health?  Very good    Frequency of exercise:  4-5 days/week    Duration of exercise:  15-30 minutes    Do you usually eat at least 4 servings of fruit and vegetables a day, include whole grains    & fiber and avoid regularly eating high fat or \"junk\" foods?  Yes    Taking medications regularly:  Yes    Barriers to taking medications:  None    Medication side effects:  None    Ability to successfully perform activities of daily living:  No assistance needed    Home Safety:  No safety concerns identified    Hearing Impairment:  No hearing concerns    In the past 6 months, have you been bothered by leaking of urine?  No    In general, how would you rate your overall mental or emotional health?  Very good      PHQ-2 Total Score: 0    Additional concerns today:  No        Have you ever done Advance Care Planning? (For example, a Health Directive, POLST, or a discussion with a medical provider or your loved ones about your wishes): Yes, patient states has an Advance Care Planning document and will bring a copy to the clinic.       Fall risk  Fallen 2 or more times in the past year?: No  Any fall with injury in the past year?: No    Cognitive Screening   1) Repeat 3 items (Leader, Season, Table)    2) Clock draw: NORMAL  3) 3 item recall: Recalls 1 object   Results: NORMAL clock, 1-2 items recalled: COGNITIVE IMPAIRMENT LESS LIKELY    Mini-CogTM Copyright MAYTE Bello. Licensed by the author for use in [facility]; reprinted with permission (sven@.[facility]). All rights reserved.      Do you have sleep apnea, excessive snoring or daytime drowsiness?: no    Reviewed and updated as needed " this visit by clinical staff   Tobacco  Allergies  Meds              Reviewed and updated as needed this visit by Provider                 Social History     Tobacco Use     Smoking status: Never     Smokeless tobacco: Never   Vaping Use     Vaping status: Not on file   Substance Use Topics     Alcohol use: Yes     Alcohol/week: 0.0 standard drinks of alcohol     Comment: maybe once or twice a month              View : No data to display.              Do you have a current opioid prescription? No  Do you use any other controlled substances or medications that are not prescribed by a provider? Alcohol        Current providers sharing in care for this patient include:   Patient Care Team:  No Ref-Primary, Physician as PCP - Angie Neves MD as Assigned Cancer Care Provider  Dallin Parham MD as Assigned PCP    The following health maintenance items are reviewed in Epic and correct as of today:  Health Maintenance   Topic Date Due     ANNUAL REVIEW OF  ORDERS  Never done     ZOSTER IMMUNIZATION (2 of 3) 08/14/2007     MEDICARE ANNUAL WELLNESS VISIT  06/23/2016     ADVANCE CARE PLANNING  01/02/2018     COVID-19 Vaccine (5 - Moderna series) 07/04/2022     FALL RISK ASSESSMENT  05/17/2024     COLORECTAL CANCER SCREENING  08/12/2024     DTAP/TDAP/TD IMMUNIZATION (2 - Td or Tdap) 08/03/2032     PHQ-2 (once per calendar year)  Completed     Pneumococcal Vaccine: 65+ Years  Completed     IPV IMMUNIZATION  Aged Out     MENINGITIS IMMUNIZATION  Aged Out     INFLUENZA VACCINE  Discontinued     Patient Active Problem List   Diagnosis     CARDIOVASCULAR SCREENING; LDL GOAL LESS THAN 160     Osteopenia     Advanced directives, counseling/discussion     Cancer of right colon (H)     Malignant neoplasm of upper-inner quadrant of right female breast (H)     Thyroid nodule     Past Surgical History:   Procedure Laterality Date     BIOPSY Right 06/29/2015    Right Breast and Right Axillary Node     CATARACT IOL, RT/LT        COLONOSCOPY N/A 2015    Procedure: COMBINED COLONOSCOPY, SINGLE OR MULTIPLE BIOPSY/POLYPECTOMY BY BIOPSY;  Surgeon: Napoleon Quinn MD;  Location:  GI     COLONOSCOPY N/A 2019    Procedure: COLONOSCOPY, WITH POLYPECTOMY AND BIOPSY;  Surgeon: Courtney Melendez MD;  Location:  GI     INSERT PORT VASCULAR ACCESS N/A 2015    Procedure: INSERT PORT VASCULAR ACCESS;  Surgeon: Abdifatah Staples MD;  Location: [facility]     LAPAROSCOPIC ASSISTED COLECTOMY Right 2015    Procedure: LAPAROSCOPIC ASSISTED COLECTOMY;  Surgeon: Abdifatah Staples MD;  Location:  OR     LAPAROSCOPIC OOPHORECTOMY      for benign growth     LUMPECTOMY BREAST      Right breast - Benign  1950's     LUMPECTOMY BREAST Right 2015    Procedure: LUMPECTOMY BREAST;  Surgeon: Abdifatah Staples MD;  Location: [facility]     LUMPECTOMY BREAST, DISSECT AXILLARY NODE(S), COMBINED Right 2015    Procedure: COMBINED LUMPECTOMY BREAST, DISSECT AXILLARY NODE(S);  Surgeon: Abdifatah Staples MD;  Location:  OR     ORTHOPEDIC SURGERY       REMOVE PORT VASCULAR ACCESS N/A 2016    Procedure: REMOVE PORT VASCULAR ACCESS;  Surgeon: Abdifatah Staples MD;  Location: [facility]       Social History     Tobacco Use     Smoking status: Never     Smokeless tobacco: Never   Vaping Use     Vaping status: Not on file   Substance Use Topics     Alcohol use: Yes     Alcohol/week: 0.0 standard drinks of alcohol     Comment: maybe once or twice a month     Family History   Problem Relation Age of Onset     Diabetes Father      Cerebrovascular Disease Mother          age 94     Breast Cancer Mother 75         Current Outpatient Medications   Medication Sig Dispense Refill     Calcium Carb-Cholecalciferol (CALCIUM + D3 PO) Take 1 tablet by mouth daily Dose 500 - 500 mg       Multiple Minerals-Vitamins (BONE DENSITY BUILDER) TABS Take by mouth daily       Multiple Vitamins-Minerals (MULTIVITAMIN & MINERAL PO) Take 1 tablet by mouth daily.        "vitamin E (TOCOPHEROL) 400 units (180 mg) capsule Take 400 Units by mouth daily       Allergies   Allergen Reactions     Azithromycin Muscle Pain (Myalgia)     Back and leg pains - only at night         Review of Systems  Constitutional, HEENT, cardiovascular, pulmonary, gi and gu systems are negative, except as otherwise noted.    OBJECTIVE:   /73 (BP Location: Left arm, Patient Position: Sitting, Cuff Size: Adult Large)   Pulse 80   Temp 97.7  F (36.5  C) (Oral)   Resp 16   Ht 1.664 m (5' 5.5\")   Wt 66 kg (145 lb 9.6 oz)   SpO2 95%   BMI 23.86 kg/m   Estimated body mass index is 23.86 kg/m  as calculated from the following:    Height as of this encounter: 1.664 m (5' 5.5\").    Weight as of this encounter: 66 kg (145 lb 9.6 oz).  Physical Exam  GENERAL APPEARANCE: healthy, alert and no distress  EYES: Eyes grossly normal to inspection, PERRL and conjunctivae and sclerae normal  HENT: ear canals and TM's normal, nose and mouth without ulcers or lesions, oropharynx clear and oral mucous membranes moist  NECK: no adenopathy, no asymmetry, masses, or scars and thyroid normal to palpation  RESP: lungs clear to auscultation - no rales, rhonchi or wheezes  CV: regular rate and rhythm, normal S1 S2, no S3 or S4, a systolic murmur is audible at the upper sternal border, click or rub, no peripheral edema and peripheral pulses strong  ABDOMEN: soft, nontender, no hepatosplenomegaly, no masses and bowel sounds normal  MS: no musculoskeletal defects are noted and gait is age appropriate without ataxia  SKIN: no suspicious lesions or rashes  NEURO: Normal strength and tone, sensory exam grossly normal, mentation intact and speech normal  PSYCH: mentation appears normal and affect normal/bright    Labs pending     ASSESSMENT / PLAN:       ICD-10-CM    1. Routine general medical examination at a health care facility  Z00.00       2. Malignant neoplasm of colon, unspecified part of colon (H)  C18.9       3. Heart " murmur  R01.1 Echocardiogram Complete      4. Visit for screening mammogram  Z12.31 *MA Screening Digital Bilateral      continue follow up with Dr. Rodas as directed  She has a systolic murmur, I think we should update he echo to make sure she is not developing aortic stenosis that is of clinical significance       Patient has been advised of split billing requirements and indicates understanding: Yes      COUNSELING:  Reviewed preventive health counseling, as reflected in patient instructions  Special attention given to:       Regular exercise       Healthy diet/nutrition       Immunizations    Recommended covid booster today and shingrix at pharmacy      colonoscopy will be due in 8/2024 and mammogram ordered for 8/2023              She reports that she has never smoked. She has never used smokeless tobacco.      Appropriate preventive services were discussed with this patient, including applicable screening as appropriate for cardiovascular disease, diabetes, osteopenia/osteoporosis, and glaucoma.  As appropriate for age/gender, discussed screening for colorectal cancer, prostate cancer, breast cancer, and cervical cancer. Checklist reviewing preventive services available has been given to the patient.    Reviewed patients plan of care and provided an AVS. The Basic Care Plan (routine screening as documented in Health Maintenance) for Wanda meets the Care Plan requirement. This Care Plan has been established and reviewed with the Patient.      Dallin Parham MD  [facility]    Identified Health Risks:

## 2023-05-18 LAB
ALBUMIN SERPL BCG-MCNC: 4.6 G/DL (ref 3.5–5.2)
ALP SERPL-CCNC: 62 U/L (ref 35–104)
ALT SERPL W P-5'-P-CCNC: 21 U/L (ref 10–35)
ANION GAP SERPL CALCULATED.3IONS-SCNC: 11 MMOL/L (ref 7–15)
AST SERPL W P-5'-P-CCNC: 28 U/L (ref 10–35)
BILIRUB SERPL-MCNC: 0.5 MG/DL
BUN SERPL-MCNC: 15.7 MG/DL (ref 8–23)
CALCIUM SERPL-MCNC: 9.8 MG/DL (ref 8.8–10.2)
CHLORIDE SERPL-SCNC: 102 MMOL/L (ref 98–107)
CHOLEST SERPL-MCNC: 238 MG/DL
CREAT SERPL-MCNC: 0.92 MG/DL (ref 0.51–0.95)
DEPRECATED HCO3 PLAS-SCNC: 25 MMOL/L (ref 22–29)
GFR SERPL CREATININE-BSD FRML MDRD: 60 ML/MIN/1.73M2
GLUCOSE SERPL-MCNC: 98 MG/DL (ref 70–99)
HDLC SERPL-MCNC: 64 MG/DL
LDLC SERPL CALC-MCNC: 148 MG/DL
NONHDLC SERPL-MCNC: 174 MG/DL
POTASSIUM SERPL-SCNC: 4 MMOL/L (ref 3.4–5.3)
PROT SERPL-MCNC: 7.3 G/DL (ref 6.4–8.3)
SODIUM SERPL-SCNC: 138 MMOL/L (ref 136–145)
TRIGL SERPL-MCNC: 131 MG/DL

## 2023-05-18 NOTE — RESULT ENCOUNTER NOTE
"The following letter pertains to your most recent diagnostic tests:    -Liver and gallbladder tests are normal for you. (ALT,AST, Alk phos, bilirubin), kidney function is normal for you (Creatinine, GFR), Sodium is normal, Potassium is normal for you, Calcium is normal for you, Glucose (blood sugar) is normal for you.      -Your total cholesterol is 238 which is above your goal of total cholesterol less than 200.    -Your triglycerides are 131 which are at your goal of triglycerides less than 150.    -Your HDL or \"good cholesterol\" is 64 which is at your goal of HDL cholesterol greater than 50.    -Your LDL cholesterol or \"bad cholesterol\" is 148 which is slightly higher than last check when the LDL was 129.    -Your complete blood counts including your hemoglobin returned normal for you.           Bottom line:  Overall, the labs look fine for you.  The cholesterol has crept up a bit since last check.  I would not recommend a medication for this, but I would focus on your diet to attempt to improve this.  Eating a diet high in fiber can help improve your total cholesterol and LDL or 'bad' cholesterol levels.  Try to eat more servings of fruits and vegetables.  Eat more high-fiber foods like whole-grains and nuts.  Try to eat more fish.  Eat less fatty, marbled meats and try to avoid fried foods if possible too.  Doing all this will result in improved cholesterol numbers and, more importantly, better health.    We can recheck in a year.        Follow up:  Schedule an appointment for a physical in one year's time, or return sooner if new questions, symptoms or problems arise.        Sincerely,    Dr. Parham"

## 2023-08-09 ENCOUNTER — HOSPITAL ENCOUNTER (OUTPATIENT)
Dept: MAMMOGRAPHY | Facility: CLINIC | Age: 88
Discharge: HOME | End: 2023-08-09
Attending: INTERNAL MEDICINE | Admitting: INTERNAL MEDICINE
Payer: COMMERCIAL

## 2023-08-09 DIAGNOSIS — Z12.31 VISIT FOR SCREENING MAMMOGRAM: ICD-10-CM

## 2023-08-09 PROCEDURE — 77067 SCR MAMMO BI INCL CAD: CPT

## 2023-08-23 ENCOUNTER — HOSPITAL ENCOUNTER (OUTPATIENT)
Dept: CARDIOLOGY | Facility: CLINIC | Age: 88
Discharge: HOME | End: 2023-08-23
Attending: INTERNAL MEDICINE | Admitting: INTERNAL MEDICINE
Payer: COMMERCIAL

## 2023-08-23 DIAGNOSIS — R01.1 HEART MURMUR: ICD-10-CM

## 2023-08-23 LAB — LVEF ECHO: NORMAL

## 2023-08-23 PROCEDURE — 93306 TTE W/DOPPLER COMPLETE: CPT | Mod: 26 | Performed by: INTERNAL MEDICINE

## 2023-08-23 PROCEDURE — 93356 MYOCRD STRAIN IMG SPCKL TRCK: CPT

## 2023-08-23 PROCEDURE — 93356 MYOCRD STRAIN IMG SPCKL TRCK: CPT | Performed by: INTERNAL MEDICINE

## 2023-08-23 NOTE — LETTER
2023      Wanda Dockery  [address]  [place] [address]        Dear ,    We are writing to inform you of your test results.    The following letter pertains to your most recent diagnostic tests:     The echocardiogram does not show any acutely dangerous problems to explain your heart murmur.  You have a moderately leaky aortic heart valve.  However, if you are feeling well, I do not think that this needs any specific intervention.  We can simply monitor this over time.  Provided that there are no new symptoms of shortness of breath or exercise intolerance, this could be rechecked in 1 year.  Contact us sooner if you have any new symptoms.       Resulted Orders   Echocardiogram Complete   Result Value Ref Range    LVEF  55-60%     Narrative    [id number]  [id number]  [id number]  [id number]^AYLEEN^FIDE^MARYCARMEN     [facility]  [facility]  Echocardiography Laboratory  [address]  [place] [address]  Phone [phone]  Fax [phone]     Name: WANDA DOCKERY  MRN: [id number]  : 1935  Study Date: 2023 11:10 AM  Age: 88 yrs  Gender: Female  Patient Location: [facility]  Reason For Study: Heart murmur  Ordering Physician: FIDE ABBASI  Referring Physician: FIDE ABBASI  Performed By: Rohini Pacheco     BSA: 1.7 m2  Height: 65 in  Weight: 145 lb  HR: 80  BP: 127/72 mmHg  ______________________________________________________________________________  Procedure  Complete Echo Adult. Myocardial Strain Imaging performed.     ______________________________________________________________________________  Interpretation Summary     The left ventricle is normal in size.  Left ventricular systolic function is normal.  The visual ejection fraction is 55-60%.  No regional wall motion abnormalities noted.  The aortic valve is trileaflet with aortic valve sclerosis. There is moderate  (2+) aortic regurgitation.  No hemodynamically  significant valvular aortic stenosis.  Global peak LV longitudinal strain is averaged at -17.4%. This suggests  abnormal strain (normal <-18%).     The aortic valve regurgitation has increased from mild to moderate since the  previous study.  ______________________________________________________________________________  Left Ventricle  The left ventricle is normal in size. There is normal left ventricular wall  thickness. Left ventricular systolic function is normal. The visual ejection  fraction is 55-60%. Grade I or early diastolic dysfunction. Global peak LV  longitudinal strain is averaged at -17.4%. This suggests abnormal strain  (normal <-18%). No regional wall motion abnormalities noted.     Right Ventricle  The right ventricle is normal in structure, function and size.     Atria  Normal left atrial size. Right atrial size is normal. There is no atrial shunt  seen.     Mitral Valve  The mitral valve is normal in structure and function. There is trace mitral  regurgitation.     Tricuspid Valve  The tricuspid valve is normal in structure and function. The right ventricular  systolic pressure is approximated at 18mmHg plus the right atrial pressure.  There is trace tricuspid regurgitation. IVC diameter <2.1 cm collapsing >50%  with sniff suggests a normal RA pressure of 3 mmHg. Right ventricle systolic  pressure estimate normal.     Aortic Valve  The aortic valve is trileaflet with aortic valve sclerosis. There is moderate  (2+) aortic regurgitation. The calculated aortic valve are is 2.5 cm^2. The  peak AoV pressure gradient is 7.0 mmHg. The mean AoV pressure gradient is 4.0  mmHg. No hemodynamically significant valvular aortic stenosis.     Pulmonic Valve  The pulmonic valve is not well seen, but is grossly normal. There is trace  pulmonic valvular regurgitation.     Vessels  Normal size aorta. The inferior vena cava was normal in size with preserved  respiratory variability.     Pericardium  There is no  pericardial effusion.     ______________________________________________________________________________  MMode/2D Measurements & Calculations  IVSd: 0.70 cm  LVIDd: 3.3 cm  LVIDs: 2.3 cm  LVPWd: 1.0 cm  FS: 30.3 %  LV mass(C)d: 74.7 grams  LV mass(C)dI: 43.3 grams/m2  Ao root diam: 3.0 cm  asc Aorta Diam: 3.8 cm  LVOT diam: 1.9 cm  LVOT area: 2.8 cm2  LA Volume (BP): 19.5 ml     LA Volume Index (BP): 11.3 ml/m2  RWT: 0.61  TAPSE: 1.8 cm     Doppler Measurements & Calculations  MV E max eladio: 53.5 cm/sec  MV A max eladio: 102.0 cm/sec  MV E/A: 0.52  MV dec time: 0.20 sec  Ao V2 max: 133.0 cm/sec  Ao max P.0 mmHg  Ao V2 mean: 90.3 cm/sec  Ao mean P.0 mmHg  Ao V2 VTI: 25.7 cm  ROBERTA(I,D): 2.5 cm2  ROBERTA(V,D): 2.4 cm2  AI P1/2t: 353.9 msec  LV V1 max P.0 mmHg  LV V1 max: 112.0 cm/sec  LV V1 VTI: 22.5 cm     SV(LVOT): 63.8 ml  SI(LVOT): 37.0 ml/m2  PA acc time: 0.12 sec  AV Eladio Ratio (DI): 0.84  ROBERTA Index (cm2/m2): 1.4  E/E' av.2  Lateral E/e': 6.1  Medial E/e': 10.2  RV S Eladio: 11.0 cm/sec     ______________________________________________________________________________  Report approved by: Shane Norris 2023 04:07 PM             If you have any questions or concerns, please call the clinic at the number listed above.       Sincerely,      Dallin Parham MD

## 2023-08-24 PROBLEM — I35.1 NONRHEUMATIC AORTIC VALVE INSUFFICIENCY: Status: ACTIVE | Noted: 2023-08-24

## 2023-08-25 NOTE — RESULT ENCOUNTER NOTE
The following letter pertains to your most recent diagnostic tests:    The echocardiogram does not show any acutely dangerous problems to explain your heart murmur.  You have a moderately leaky aortic heart valve.  However, if you are feeling well, I do not think that this needs any specific intervention.  We can simply monitor this over time.  Provided that there are no new symptoms of shortness of breath or exercise intolerance, this could be rechecked in 1 year.  Contact us sooner if you have any new symptoms.      Sincerely,    Dr. Parham

## 2024-01-15 ENCOUNTER — LAB (OUTPATIENT)
Dept: INFUSION THERAPY | Facility: CLINIC | Age: 89
End: 2024-01-15
Attending: INTERNAL MEDICINE
Payer: COMMERCIAL

## 2024-01-15 DIAGNOSIS — C18.9 MALIGNANT NEOPLASM OF COLON, UNSPECIFIED PART OF COLON (H): ICD-10-CM

## 2024-01-15 LAB
ALBUMIN SERPL BCG-MCNC: 4.3 G/DL (ref 3.5–5.2)
ALP SERPL-CCNC: 63 U/L (ref 40–150)
ALT SERPL W P-5'-P-CCNC: 22 U/L (ref 0–50)
ANION GAP SERPL CALCULATED.3IONS-SCNC: 9 MMOL/L (ref 7–15)
AST SERPL W P-5'-P-CCNC: 29 U/L (ref 0–45)
BILIRUB SERPL-MCNC: 0.4 MG/DL
BUN SERPL-MCNC: 17.4 MG/DL (ref 8–23)
CALCIUM SERPL-MCNC: 9.5 MG/DL (ref 8.8–10.2)
CHLORIDE SERPL-SCNC: 104 MMOL/L (ref 98–107)
CREAT SERPL-MCNC: 0.85 MG/DL (ref 0.51–0.95)
DEPRECATED HCO3 PLAS-SCNC: 26 MMOL/L (ref 22–29)
EGFRCR SERPLBLD CKD-EPI 2021: 66 ML/MIN/1.73M2
ERYTHROCYTE [DISTWIDTH] IN BLOOD BY AUTOMATED COUNT: 13.1 % (ref 10–15)
GLUCOSE SERPL-MCNC: 89 MG/DL (ref 70–99)
HCT VFR BLD AUTO: 39.4 % (ref 35–47)
HGB BLD-MCNC: 13.7 G/DL (ref 11.7–15.7)
MCH RBC QN AUTO: 32.5 PG (ref 26.5–33)
MCHC RBC AUTO-ENTMCNC: 34.8 G/DL (ref 31.5–36.5)
MCV RBC AUTO: 93 FL (ref 78–100)
PLATELET # BLD AUTO: 297 10E3/UL (ref 150–450)
POTASSIUM SERPL-SCNC: 4.7 MMOL/L (ref 3.4–5.3)
PROT SERPL-MCNC: 7.3 G/DL (ref 6.4–8.3)
RBC # BLD AUTO: 4.22 10E6/UL (ref 3.8–5.2)
SODIUM SERPL-SCNC: 139 MMOL/L (ref 135–145)
WBC # BLD AUTO: 4.2 10E3/UL (ref 4–11)

## 2024-01-15 PROCEDURE — 82378 CARCINOEMBRYONIC ANTIGEN: CPT | Performed by: INTERNAL MEDICINE

## 2024-01-15 PROCEDURE — 85027 COMPLETE CBC AUTOMATED: CPT | Performed by: INTERNAL MEDICINE

## 2024-01-15 PROCEDURE — 36415 COLL VENOUS BLD VENIPUNCTURE: CPT

## 2024-01-15 PROCEDURE — 80053 COMPREHEN METABOLIC PANEL: CPT | Performed by: INTERNAL MEDICINE

## 2024-01-17 ENCOUNTER — ONCOLOGY VISIT (OUTPATIENT)
Dept: ONCOLOGY | Facility: CLINIC | Age: 89
End: 2024-01-17
Attending: INTERNAL MEDICINE
Payer: COMMERCIAL

## 2024-01-17 VITALS
HEART RATE: 80 BPM | SYSTOLIC BLOOD PRESSURE: 134 MMHG | WEIGHT: 142.2 LBS | OXYGEN SATURATION: 97 % | BODY MASS INDEX: 23.3 KG/M2 | RESPIRATION RATE: 16 BRPM | DIASTOLIC BLOOD PRESSURE: 82 MMHG

## 2024-01-17 DIAGNOSIS — C18.9 MALIGNANT NEOPLASM OF COLON, UNSPECIFIED PART OF COLON (H): Primary | ICD-10-CM

## 2024-01-17 DIAGNOSIS — C18.2 CANCER OF RIGHT COLON (H): ICD-10-CM

## 2024-01-17 DIAGNOSIS — Z12.11 SCREENING FOR COLON CANCER: ICD-10-CM

## 2024-01-17 DIAGNOSIS — Z12.31 ENCOUNTER FOR SCREENING MAMMOGRAM FOR BREAST CANCER: ICD-10-CM

## 2024-01-17 PROCEDURE — 99214 OFFICE O/P EST MOD 30 MIN: CPT | Performed by: INTERNAL MEDICINE

## 2024-01-17 PROCEDURE — G0463 HOSPITAL OUTPT CLINIC VISIT: HCPCS | Performed by: INTERNAL MEDICINE

## 2024-01-17 ASSESSMENT — PAIN SCALES - GENERAL: PAINLEVEL: NO PAIN (0)

## 2024-01-17 NOTE — LETTER
1/17/2024         RE: Wanda Dockery  [address]  [place] [address]        Dear Colleague,    Thank you for referring your patient, Wanda Dockery, to the [facility]. Please see a copy of my visit note below.    ONCOLOGIC HISTORY:  Wanda Dockery is a female with:  -Stage IIB (pT2 pN1a) right breast cancer. ER positive and HER-2/mario positive  -Stage II (pT2 pN0 M0) colon cancer.     1. Bilateral diagnostic mammograms and right breast ultrasound on 06/23/2015 revealed 3 cm lesion at 1 o'clock position and right axillary lymphadenopathy.   -Ultrasound-guided biopsy of right breast and axillary lymph node on 06/29/2015 revealed grade 3 invasive ductal carcinoma, both in the breast and axillary lymph node. ER positive and HER-2/mario equivocal by FISH. IHC is 3+ in the right breast. In the right lymph node IHC is 2+.     2. Bone scan on 07/07/2015 did not reveal any bone metastasis.   -CT of the chest, abdomen and pelvis on 07/07/2015 revealed right breast lesion and 1.2 x 2 cm right axillary lymph node. There is thickening of the wall of the hepatitic flexure of the colon with associated inflammatory changes and multiple mildly prominent lymph nodes in the mesentery.   -Colonoscopy on 07/13/2015 revealed partially obstructing mass at the hepatic flexure and a sessile polyp in the cecum.  Hepatic flexure mass revealed poorly differentiated adenocarcinoma. 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 There is absence of expression of DNA mismatch repair protein MLH1 and PMS2.      3. Right breast lumpectomy, right lymph node dissection and right colectomy was done on 07/16/2015.    -Hemicolectomy specimen revealed high-grade adenocarcinoma measuring 7.5 cm extending into the santi-intestinal fatty tissue. Margins were negative. No lymphovascular invasion. Twenty-one benign lymph nodes. She has stage II (pT2 pN0 M0) colon cancer.   -Right breast reveals invasive ductal carcinoma, grade 2. It  measures 2.1 cm. There was no DCIS. There was lymphovascular invasion present. Margins positive for invasive carcinoma. One of 7 lymph nodes positive. No extranodal extension. The patient has stage IIB (pT2 pN1a) breast cancer.   -Reexcision of lumpectomy site on 07/29/2015. There was no residual malignancy.      4. Taxol and Herceptin weekly x 12 between 08/17/2015 and 11/02/2015. One year of herceptin completed on 08/08/2016.  -Anastrazole x 5 years between 11/30/2015 and 12/2020.  -Radiation to breast cancer.  5040 cGy between 04/18/2016 and 06/15/2016.       5. DEXA scan on 04/25/2016 reveals osteopenia.  -DEXA scan on 08/09/2019 reveals worsening osteopenia.     6. Colonoscopy on 08/12/2019 does not reveal any malignancy.     SUBJECTIVE:  Ms. Dockery is an 88-year-old female with history of breast cancer and colon cancer as described above.       She has no complaints. No excessive fatigue. She continues to work one day a week. No headache.  No dizziness.  No chest pain. No shortness of breath.  No abdominal pain, nausea or vomiting. Appetite is good.  No urinary or bowel complaints.  No bleeding. All other review of systems is negative.     PHYSICAL EXAMINATION:   Alert and oriented x 3.   VITAL SIGNS: Reviewed. ECOG PS of 0.  EYES: No icterus.   NECK: Supple. No lymphadenopathy or thyromegaly.   AXILLAE: No lymphadenopathy.   LUNGS: Good air entry bilaterally. No crackles or wheezing.   HEART: Regular. No murmur.   GI: Abdomen is soft. Nontender. No mass.   EXTREMITIES: No edema. No calf swelling or tenderness.   SKIN: No petechiae.     LABORATORY: CBC, CMP and CEA is normal.       ASSESSMENT:    1.  An 88-year-old female with stage IIB right breast cancer diagnosed in 2015.  No evidence of recurrence.  2.  Stage II right-sided colon cancer diagnosed in 2015.  No evidence of recurrence.     PLAN:     1.  Patient is doing very well for her age.  No clinical suspicion of recurrence of malignancy.    Her breast  cancer was in 2015.  Risk of recurrence is very low. She will continue with annual mammogram and annual history and physical.     Her colon cancer was in 2015.  Risk of recurrence is very low.  She she will continue with yearly labs including CBC and CEA.  Her colonoscopy is due this fall.  Will schedule it. No routine imaging studies needed.      2.  She and her  had a few questions, which were all answered.  I will see her in 1 year's time with labs. Advised her to call us with any questions or concerns.     Total visit time of 30 minutes.  Time was spent in today's visit, review of chart/investigations today and documentation today.    Again, thank you for allowing me to participate in the care of your patient.        Sincerely,        Angie Rodas MD

## 2024-01-17 NOTE — PROGRESS NOTES
ONCOLOGIC HISTORY:  Wanda Dockery is a female with:  -Stage IIB (pT2 pN1a) right breast cancer. ER positive and HER-2/mario positive  -Stage II (pT2 pN0 M0) colon cancer.     1. Bilateral diagnostic mammograms and right breast ultrasound on 06/23/2015 revealed 3 cm lesion at 1 o'clock position and right axillary lymphadenopathy.   -Ultrasound-guided biopsy of right breast and axillary lymph node on 06/29/2015 revealed grade 3 invasive ductal carcinoma, both in the breast and axillary lymph node. ER positive and HER-2/mario equivocal by FISH. IHC is 3+ in the right breast. In the right lymph node IHC is 2+.     2. Bone scan on 07/07/2015 did not reveal any bone metastasis.   -CT of the chest, abdomen and pelvis on 07/07/2015 revealed right breast lesion and 1.2 x 2 cm right axillary lymph node. There is thickening of the wall of the hepatitic flexure of the colon with associated inflammatory changes and multiple mildly prominent lymph nodes in the mesentery.   -Colonoscopy on 07/13/2015 revealed partially obstructing mass at the hepatic flexure and a sessile polyp in the cecum.  Hepatic flexure mass revealed poorly differentiated adenocarcinoma. 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 There is absence of expression of DNA mismatch repair protein MLH1 and PMS2.      3. Right breast lumpectomy, right lymph node dissection and right colectomy was done on 07/16/2015.    -Hemicolectomy specimen revealed high-grade adenocarcinoma measuring 7.5 cm extending into the santi-intestinal fatty tissue. Margins were negative. No lymphovascular invasion. Twenty-one benign lymph nodes. She has stage II (pT2 pN0 M0) colon cancer.   -Right breast reveals invasive ductal carcinoma, grade 2. It measures 2.1 cm. There was no DCIS. There was lymphovascular invasion present. Margins positive for invasive carcinoma. One of 7 lymph nodes positive. No extranodal extension. The patient has stage IIB (pT2 pN1a) breast cancer.   -Reexcision of lumpectomy  site on 07/29/2015. There was no residual malignancy.      4. Taxol and Herceptin weekly x 12 between 08/17/2015 and 11/02/2015. One year of herceptin completed on 08/08/2016.  -Anastrazole x 5 years between 11/30/2015 and 12/2020.  -Radiation to breast cancer.  5040 cGy between 04/18/2016 and 06/15/2016.       5. DEXA scan on 04/25/2016 reveals osteopenia.  -DEXA scan on 08/09/2019 reveals worsening osteopenia.     6. Colonoscopy on 08/12/2019 does not reveal any malignancy.     SUBJECTIVE:  Ms. Dockery is an 88-year-old female with history of breast cancer and colon cancer as described above.       She has no complaints. No excessive fatigue. She continues to work one day a week. No headache.  No dizziness.  No chest pain. No shortness of breath.  No abdominal pain, nausea or vomiting. Appetite is good.  No urinary or bowel complaints.  No bleeding. All other review of systems is negative.     PHYSICAL EXAMINATION:   Alert and oriented x 3.   VITAL SIGNS: Reviewed. ECOG PS of 0.  EYES: No icterus.   NECK: Supple. No lymphadenopathy or thyromegaly.   AXILLAE: No lymphadenopathy.   LUNGS: Good air entry bilaterally. No crackles or wheezing.   HEART: Regular. No murmur.   GI: Abdomen is soft. Nontender. No mass.   EXTREMITIES: No edema. No calf swelling or tenderness.   SKIN: No petechiae.     LABORATORY: CBC, CMP and CEA is normal.       ASSESSMENT:    1.  An 88-year-old female with stage IIB right breast cancer diagnosed in 2015.  No evidence of recurrence.  2.  Stage II right-sided colon cancer diagnosed in 2015.  No evidence of recurrence.     PLAN:     1.  Patient is doing very well for her age.  No clinical suspicion of recurrence of malignancy.    Her breast cancer was in 2015.  Risk of recurrence is very low. She will continue with annual mammogram and annual history and physical.     Her colon cancer was in 2015.  Risk of recurrence is very low.  She she will continue with yearly labs including CBC and CEA.   Her colonoscopy is due this fall.  Will schedule it. No routine imaging studies needed.      2.  She and her  had a few questions, which were all answered.  I will see her in 1 year's time with labs. Advised her to call us with any questions or concerns.     Total visit time of 30 minutes.  Time was spent in today's visit, review of chart/investigations today and documentation today.

## 2024-01-18 LAB — CEA SERPL-MCNC: 2.3 NG/ML

## 2024-05-03 ENCOUNTER — TELEPHONE (OUTPATIENT)
Dept: GASTROENTEROLOGY | Facility: CLINIC | Age: 89
End: 2024-05-03
Payer: COMMERCIAL

## 2024-05-03 NOTE — LETTER
July 19, 2024      Wanda Dockery  [address] [place] [address]              Standard Miralax Bowel Prep   Prep instructions for your colonoscopy   For prep questions, please call: [facility] - [address] [place] [address] - [phone] option 4    Please read these instructions carefully at least 7 days prior to your colonoscopy procedure. Be sure to follow all directions completely. The inside of your colon must be clean to allow for a complete examination for the presence of any growths, polyps, and/or abnormalities, as well as their biopsy or removal. A number of tips are included in order to make this part of the procedure as comfortable as possible.    Getting ready   Purchase the following items over-the-counter/off the shelf at the drug store:    Four (4) - Dulcolax laxative (Bisacodyl) 5mg tablets (Do not use Dulcolax stool softener)   8.3 ounce bottle of Miralax powder (ClearLAX, SmoothLAX, PowderLAX)  64 ounces of Gatorade or similar sports drink. Not red or purple. (Pedialyte, Propel, Gatorade G2/Zero, Powerade, Powerade Zero)   10 ounce bottle of clear Magnesium Citrate    A nurse will call you to go over your appointment details and prep instructions. Not completing the nurse call could result with your appointment being cancelled.    You must arrange for an adult to drive you home after your exam. Your colonoscopy cannot be done unless you have a ride. If you need to use public transportation, someone must ride with you and stay with you for up to 24 hours.       7 days before procedure     Consult with your prescribing provider about stopping any:     Diabetic/Weight Loss Injectable Medication GLP-1 agonist (such as Exenatide (Byetta, Bydureon),  Mounjaro (Tirzepatide). Ozempic (Semaglutide). Semaglutide. Symlin (Pamlintide), Tanzeum (Albiglutide). Tirzepatide-Weight Management (Zepbound), Trulicity (Dulaglutide), Victoza (Saxenda, Liraglutide), Wegovy (Semaglutide)  please follow below guidelines for holding:    For weekly injection HOLD 7 days before procedure.  For once or twice a day injection HOLD the day before procedure and day of procedure.  For oral, daily dosing (Rybelsus) HOLD 7 days before procedure.    Blood thinning and/or anti platelet medications: such as Coumadin, Plavix, Xarelto, Eliquis, Lovenox or others, these medications may need to be stopped temporarily before your procedure.     If you take insulin for diabetes, ask your prescribing provider for instructions on how to take this medication while preparing for a colonoscopy.     NSAIDs medications such as Sulindac, Celebrex, Mobic, Relafen or others may need to be stopped before the procedure. This will be discussed during nurse review call, or you can reach out to your prescribing provider.      Stop taking iron (ferrous sulfate), multivitamins that contain iron, and/or fiber supplements (Metamucil, Benefiber, Psyllium husk powder, Fibercon, etc.).      Stop eating whole kernel corn, popcorn, nuts, and foods that contain seeds. These can stay in the colon for many days, and they can clog up the colonoscope.       3 days before procedure     Begin a low-fiber diet (see examples below). No Olestra (a fat substitute).    Consume no more than 10-15 grams of fiber each day.     It is important to stay hydrated. Drink at least eight 8-ounce glasses of water a day.      LOW FIBER DIET   You can have:   Do not have:    Starches: White bread, rolls, biscuits, croissants, Ynaet toast, white flour tortillas, waffles, pancakes, Lithuanian toast; white rice, noodles, pasta, macaroni; cooked and peeled potatoes; plain crackers, saltines; cooked farina or cream of rice; puffed rice, corn flakes, Rice Krispies, Special K      Vegetables: tender cooked and canned, vegetable broths     Fruits and fruit juices: Strained fruit juice, canned fruit without seeds or skin (not pineapple), applesauce, pear sauce, ripe bananas, melons  (not watermelon)     Milk products: Milk (plain or flavored), cheese, cottage cheese, yogurt (no berries), custard, ice cream       Proteins: Tender, well-cooked ground beef, lamb, veal, ham, pork, chicken, turkey, fish or organ meat, Tofu, eggs, creamy peanut butter      Fats and condiments:  Margarine, butter, oils, mayonnaise, sour cream, salad dressing, plain gravy; spices, cooked herbs; sugar, clear jelly, honey, syrup      Snacks, sweets and drinks: Pretzels, hard candy; plain cakes and cookies (no nuts or seeds); gelatin, plain pudding, sherbet, Popsicles; coffee, tea, carbonated ( fizzy ) drinks  Starches: Breads or rolls that contain nuts, seeds or fruit; whole wheat or whole grain breads that contain more than 2 grams of fiber per serving; cornbread; corn or whole wheat tortillas; potatoes with skin; brown rice, wild rice, quinoa, kasha (buckwheat), and oatmeal      Vegetables: Any raw or steamed vegetables; vegetables with seeds; corn in any form      Fruits and fruit juices: Prunes, prune juice, raisins and other dried fruits, berries and other fruits with seeds, canned pineapple juices with pulp such as orange, grapefruit, pineapple or tomato juice     Milk products: Any yogurt with nuts, seeds or berries      Proteins: Tough, fibrous meats with gristle; cooked dried beans, peas or lentils; crunchy peanut butter     Fats and condiments: Pickles, olives, relish, horseradish; jam, marmalade, preserves      Snacks, sweets and drinks: Popcorn, nuts, seeds, granola, coconut, candies made with nuts or seeds; all desserts that contain nuts, seeds, raisins and other dried fruits, coconut, whole grains or bran.       1 day before procedure       Start a clear liquid diet (see examples below). Do not eat any solid food.      Drink at least eight to ten 8-ounce glasses of water throughout the day. ? ? ? ? ? ? ? ?    Stop taking NSAIDs pain relievers, such as Advil, Ibuprofen, Motrin, etc. You may take  Tylenol.      CLEAR LIQUID DIET:  You can have: Do not have:    Water, tea, coffee (no milk or cream)   Soda pop, Gatorade (not red or purple)   Coconut water   Jell-O, Popsicles (no milk or fruit pieces - not red or purple)   Fat-free soup broth or bouillon   Plain hard candy, such as clear life savers (not red or purple)   Clear juices and fruit-flavored drinks, such as apple juice, white grape juice, Hi-C, and Nima-Aid (not red or purple)  Milk or milk products such as ice cream, malts or shakes, or coffee creamer   Red or purple drinks of any kind such as cranberry juice, grape juice or Nima-Aid. Avoid red or purple Jell-O, Popsicles, sorbet, sherbet and candy   Juices with pulp such as orange, grapefruit, pineapple or tomato juice   Cream soups of any kind   Alcohol and beer   Protein drinks or protein powder     Step 1     At 4 PM, take 2 Dulcolax (Bisacodyl) tablets.   At 5 PM, mix the entire bottle of Miralax with 64 ounces of Gatorade in a pitcher and stir to dissolve the powder. Start drinking one 8-ounce glass of the Miralax and Gatorade mixture every 15 minutes until the pitcher is HALF empty (about 4 glasses).  Drink each glass quickly. Store the rest in the refrigerator.   Continue to drink clear liquids.    Step 2     At 10 PM, take 2 Dulcolax (Bisacodyl) tablets  At 10 PM start drinking the remainder of the Miralax and Gatorade mixture. Drink one 8-ounce glass of Miralax and Gatorade mixture every 15 minutes until the pitcher is empty (about 4 glasses). Drink each glass quickly.     Step 3     If you arrive for your procedure BEFORE 11 AM:  6 hours prior to your scheduled arrival to the endoscopy unit, drink 10 ounces of clear Magnesium Citrate.    If you arrive for your procedure AFTER 11 AM:  At 6 AM on the day of the exam drink 10 ounces of clear Magnesium Citrate.       Reminders While Drinking Laxatives:     After you start drinking the solution, stay near a toilet. You may have watery stools  (diarrhea), mild cramping, bloating, and nausea. You may want to use Vaseline on the skin around your anus after each bowel movement or use wet wipes to prevent irritation. Bowel movements will be liquid and dark in color at first and then should turn clear yellow in color.      Some find it easier to drink the Miralax and Gatorade mixture when it is chilled. Do not add ice as this will dilute the laxative. Drinking from a straw can be helpful to drink the liquid faster.     If you have nausea or vomiting during drinking the solution, rinse your mouth with water and take a 15-30 minute break and then continue drinking solution.       Day of procedure     2 hours before your arrival time stop drinking all liquids, including water.   Do not smoke or swallow anything, including water or gum for at least 2 hours before your arrival time. This is a safety issue. Your procedure could be cancelled if you do not follow directions.  No chewing tobacco 6 hours prior to procedure arrival time.     You may take your necessary morning medications with sips of water (4 ounces).   Do not take diabetes medicine by mouth until after your exam.  If you have asthma, bring your inhalers.  Please perform your nebulizer treatments and airway clearance therapy in the morning prior to the procedure (if applicable).    Arrive with a responsible adult who can drive you home and stay with you for up to 24 hours. The medications used during the procedure will make you sleepy, so you won't be able to drive yourself home.   You cannot use public transportation, ride-share services, or non-medical taxi services without a responsible caregiver. Medical transport services are okay, but a caregiver must be there to receive you at your destination.  Please check in with your  when you arrive. Drivers should stay on campus.    Expect to be at the procedure center for about 1.5-2.5 hours.    Do not wear jewelry (i.e. earrings, rings, necklaces,  watches, etc.). Leave your purse, billfold, credit cards, and other valuables at home.      Bring insurance card and ID.       Answers to Commonly Asked Questions     How soon can I eat after the procedure?  You may resume your normal diet when you feel ready, unless advised otherwise by the doctor performing your procedure. We recommend starting with a light meal.   Do not drink alcohol for 24 hours after your procedure.  You may resume normal activities (work, exercise, etc.) after 24 hours.    How might I feel after the procedure?  It is normal to feel bloated and gassy after your procedure. Walking will help move the air through your colon. You can take non-aspirin pain relievers that contain acetaminophen (Tylenol).  If you are having sedation, we require a responsible adult to take you home for your safety. The sedation medicines used to relax you during the procedure can impair your judgement and reaction time, and make you forgetful and possibly a little unsteady.  Do not drive, make any important decisions, or sign any legal documents for 24 hours after your procedure.    When will I get my test results?  You should have your procedure results and any lab results (if applicable) by letter, Simmeryhart message, or phone call within 2 weeks. If you have any questions, please call the doctor that referred you for the procedure.    How do I know if my colon is cleaned out?   After completing the bowel prep, your bowel movements should be all liquid and yellow. Your bowel movements will look similar to urine in the toilet. If there are pieces of stool (poop) in the toilet, or if you can't see to the bottom of the toilet, please call our office for advice. Call [phone] and ask to speak with a nurse.    Why is the Miralax bowel prep taken in several steps?   The stool is flushed out by a large wave of fluid going through the colon. Just sipping a large volume of the solution will not achieve the desired result.  Studies have shown that two smaller waves (or more in some cases) are better than one large one.      Why do I need to drink the magnesium citrate so close to the procedure arrival time?   The intestine continues to produce mucus and waste. Longer intervals between the prep and the exam can lead to less than desired results. However, the stomach must be empty at the time of the exam in order to allow safe sedation. Therefore, there should be nothing by mouth 2 hours before the exam is started.    What if I need to cancel or reschedule my procedure?  Contact our endoscopy scheduling team at [phone], option 2. Monday through Friday, 7:00am-5:00pm.

## 2024-05-03 NOTE — TELEPHONE ENCOUNTER
"Endoscopy Scheduling Screen    Have you had a positive Covid test in the last 14 days?  No    What is your communication preference for Instructions and/or Bowel Prep?   Mail/USPS    What insurance is in the chart?  Other:  BCBS/MEDICARE    Ordering/Referring Provider: Angie Rodas MD     (If ordering provider performs procedure, schedule with ordering provider unless otherwise instructed. )    BMI: Estimated body mass index is 23.3 kg/m  as calculated from the following:    Height as of 5/17/23: 1.664 m (5' 5.5\").    Weight as of 1/17/24: 64.5 kg (142 lb 3.2 oz).     Sedation Ordered  moderate sedation.   If patient BMI > 50 do not schedule in [facility].    If patient BMI > 45 do not schedule at [facility].    Are you taking methadone or Suboxone?  No    Have you had difficulties, pain, or discomfort during past endoscopy procedures?  No    Are you taking any prescription medications for pain 3 or more times per week?   NO, No RN review required.    Do you have a history of malignant hyperthermia?  No    (Females) Are you currently pregnant?   No     Have you been diagnosed or told you have pulmonary hypertension?   No    Do you have an LVAD?  No    Have you been told you have moderate to severe sleep apnea?  No    Have you been told you have COPD, asthma, or any other lung disease?  No    Do you have any heart conditions?  No     Have you ever had or are you waiting for an organ transplant?  No. Continue scheduling, no site restrictions.    Have you had a stroke or transient ischemic attack (TIA aka \"mini stroke\" in the last 6 months?   No    Have you been diagnosed with or been told you have cirrhosis of the liver?   No    Are you currently on dialysis?   No    Do you need assistance transferring?   No    BMI: Estimated body mass index is 23.3 kg/m  as calculated from the following:    Height as of 5/17/23: 1.664 m (5' 5.5\").    Weight as of 1/17/24: 64.5 kg (142 lb 3.2 oz).     Is patients BMI > 40 and scheduling " location [facility]?  No    Do you take an injectable medication for weight loss or diabetes (excluding insulin)?  No    Do you take the medication Naltrexone?  No    Do you take blood thinners?  No       Prep   Are you currently on dialysis or do you have chronic kidney disease?  No    Do you have a diagnosis of diabetes?  No    Do you have a diagnosis of cystic fibrosis (CF)?  No    On a regular basis do you go 3 -5 days between bowel movements?  No    BMI > 40?  No    Preferred Pharmacy:      [id number] - [place] [facility] [place] [place] - [address] AT [facility] [place] [address] [facility] [address]  [address]  [place] [address]  Phone: [phone] Fax: [phone]      Final Scheduling Details     Procedure scheduled  Colonoscopy    Surgeon:       Date of procedure:  8/12     Pre-OP / PAC:   No - Not required for this site.    Location  [facility] - Patient preference.    Sedation   Moderate Sedation - Per order.      Patient Reminders:   You will receive a call from a Nurse to review instructions and health history.  This assessment must be completed prior to your procedure.  Failure to complete the Nurse assessment may result in the procedure being cancelled.      On the day of your procedure, please designate an adult(s) who can drive you home stay with you for the next 24 hours. The medicines used in the exam will make you sleepy. You will not be able to drive.      You cannot take public transportation, ride share services, or non-medical taxi service without a responsible caregiver.  Medical transport services are allowed with the requirement that a responsible caregiver will receive you at your destination.  We require that drivers and caregivers are confirmed prior to your procedure.

## 2024-08-01 ENCOUNTER — TELEPHONE (OUTPATIENT)
Dept: GASTROENTEROLOGY | Facility: CLINIC | Age: 89
End: 2024-08-01
Payer: COMMERCIAL

## 2024-08-01 NOTE — TELEPHONE ENCOUNTER
Pre assessment completed for upcoming procedure.   (Please see previous telephone encounter notes for complete details)      Procedure details:    Arrival time and facility location reviewed.    Pre op exam needed? No.    Designated  policy reviewed. Instructed to have someone stay 6  hours post procedure.       Medication review:    Medications reviewed. Please see supporting documentation below. Holding recommendations discussed (if applicable).   Ferrous Sulfate (iron supplement): HOLD 7 days before procedure. (Possible multivitamin with iron per patient)      Prep for procedure:     Patient stated they have already reviewed the bowel prep instructions multiple times and writer answered all patient questions (if applicable). NPO instructions reviewed.    Patient was instructed to call if any questions or concerns arise.       Any additional information needed:  N/A      Patient  verbalized understanding and had no questions or concerns at this time.      Nell Menchaca RN  Endoscopy Procedure Pre Assessment   [phone] option 4

## 2024-08-01 NOTE — TELEPHONE ENCOUNTER
Pre visit planning completed.      Procedure details:    Patient scheduled for Colonoscopy on 8/12/24.     Arrival time: 0645. Procedure time 0730    Facility location: [facility]; [facility]note 3[facility] [place] [address]. Check in location: 1st Floor [facility].     Sedation type: Conscious sedation     Pre op exam needed? No.    Indication for procedure:   Malignant neoplasm of colon, unspecified part of colon (H) [C18.9]  - Primary      Screening for colon cancer [Z12.11]            Chart review:     Electronic implanted devices? No    Recent diagnosis of diverticulitis within the last 6 weeks? No      Medication review:    Diabetic? No    Anticoagulants? No    Weight loss medication/injectable? No.    NSAIDS? No NSAID medications per patient's medication list.  RN will verify with pre-assessment call.    Other medication HOLDING recommendations:  N/A      Prep for procedure:     Bowel prep recommendation: Standard Miralax  Due to: standard bowel prep.    Prep instructions sent via letter CRC 7/19/24         Bee Quintero RN  Endoscopy Procedure Pre Assessment RN  [phone] option 4

## 2024-08-12 ENCOUNTER — HOSPITAL ENCOUNTER (OUTPATIENT)
Facility: CLINIC | Age: 89
Discharge: HOME | End: 2024-08-12
Attending: SURGERY | Admitting: SURGERY
Payer: COMMERCIAL

## 2024-08-12 VITALS
OXYGEN SATURATION: 95 % | DIASTOLIC BLOOD PRESSURE: 55 MMHG | HEART RATE: 63 BPM | WEIGHT: 142 LBS | SYSTOLIC BLOOD PRESSURE: 104 MMHG | BODY MASS INDEX: 23.66 KG/M2 | RESPIRATION RATE: 26 BRPM | HEIGHT: 65 IN

## 2024-08-12 LAB — COLONOSCOPY: NORMAL

## 2024-08-12 PROCEDURE — 250N000011 HC RX IP 250 OP 636: Performed by: SURGERY

## 2024-08-12 PROCEDURE — G0105 COLORECTAL SCRN; HI RISK IND: HCPCS | Performed by: SURGERY

## 2024-08-12 PROCEDURE — 45378 DIAGNOSTIC COLONOSCOPY: CPT | Performed by: SURGERY

## 2024-08-12 PROCEDURE — G0500 MOD SEDAT ENDO SERVICE >5YRS: HCPCS | Performed by: SURGERY

## 2024-08-12 RX ORDER — FENTANYL CITRATE 50 UG/ML
INJECTION, SOLUTION INTRAMUSCULAR; INTRAVENOUS PRN
Status: DISCONTINUED | OUTPATIENT
Start: 2024-08-12 | End: 2024-08-12 | Stop reason: HOSPADM

## 2024-08-12 RX ORDER — ONDANSETRON 2 MG/ML
4 INJECTION INTRAMUSCULAR; INTRAVENOUS
Status: CANCELLED | OUTPATIENT
Start: 2024-08-12

## 2024-08-12 RX ORDER — LIDOCAINE 40 MG/G
CREAM TOPICAL
Status: CANCELLED | OUTPATIENT
Start: 2024-08-12

## 2024-08-12 ASSESSMENT — ACTIVITIES OF DAILY LIVING (ADL)
ADLS_ACUITY_SCORE: 35
ADLS_ACUITY_SCORE: 35

## 2024-09-03 ENCOUNTER — HOSPITAL ENCOUNTER (OUTPATIENT)
Dept: MAMMOGRAPHY | Facility: CLINIC | Age: 89
Discharge: HOME | End: 2024-09-03
Attending: INTERNAL MEDICINE | Admitting: INTERNAL MEDICINE
Payer: COMMERCIAL

## 2024-09-03 DIAGNOSIS — Z12.31 ENCOUNTER FOR SCREENING MAMMOGRAM FOR BREAST CANCER: ICD-10-CM

## 2024-09-03 PROCEDURE — 77063 BREAST TOMOSYNTHESIS BI: CPT

## 2025-01-09 ENCOUNTER — LAB (OUTPATIENT)
Dept: INFUSION THERAPY | Facility: CLINIC | Age: 89
End: 2025-01-09
Attending: INTERNAL MEDICINE
Payer: COMMERCIAL

## 2025-01-09 DIAGNOSIS — C18.9 MALIGNANT NEOPLASM OF COLON, UNSPECIFIED PART OF COLON (H): ICD-10-CM

## 2025-01-09 DIAGNOSIS — C18.2 CANCER OF RIGHT COLON (H): ICD-10-CM

## 2025-01-09 LAB
ALBUMIN SERPL BCG-MCNC: 4.1 G/DL (ref 3.5–5.2)
ALP SERPL-CCNC: 68 U/L (ref 40–150)
ALT SERPL W P-5'-P-CCNC: 26 U/L (ref 0–50)
ANION GAP SERPL CALCULATED.3IONS-SCNC: 11 MMOL/L (ref 7–15)
AST SERPL W P-5'-P-CCNC: 40 U/L (ref 0–45)
BILIRUB SERPL-MCNC: 0.4 MG/DL
BUN SERPL-MCNC: 16.9 MG/DL (ref 8–23)
CALCIUM SERPL-MCNC: 9.4 MG/DL (ref 8.8–10.4)
CEA SERPL-MCNC: 1.9 NG/ML
CHLORIDE SERPL-SCNC: 101 MMOL/L (ref 98–107)
CREAT SERPL-MCNC: 0.88 MG/DL (ref 0.51–0.95)
EGFRCR SERPLBLD CKD-EPI 2021: 63 ML/MIN/1.73M2
ERYTHROCYTE [DISTWIDTH] IN BLOOD BY AUTOMATED COUNT: 13.3 % (ref 10–15)
GLUCOSE SERPL-MCNC: 82 MG/DL (ref 70–99)
HCO3 SERPL-SCNC: 25 MMOL/L (ref 22–29)
HCT VFR BLD AUTO: 36.9 % (ref 35–47)
HGB BLD-MCNC: 13.1 G/DL (ref 11.7–15.7)
MCH RBC QN AUTO: 32.7 PG (ref 26.5–33)
MCHC RBC AUTO-ENTMCNC: 35.5 G/DL (ref 31.5–36.5)
MCV RBC AUTO: 92 FL (ref 78–100)
PLATELET # BLD AUTO: 419 10E3/UL (ref 150–450)
POTASSIUM SERPL-SCNC: 4.2 MMOL/L (ref 3.4–5.3)
PROT SERPL-MCNC: 7.8 G/DL (ref 6.4–8.3)
RBC # BLD AUTO: 4.01 10E6/UL (ref 3.8–5.2)
SODIUM SERPL-SCNC: 137 MMOL/L (ref 135–145)
WBC # BLD AUTO: 6.2 10E3/UL (ref 4–11)

## 2025-01-09 PROCEDURE — 85018 HEMOGLOBIN: CPT | Performed by: INTERNAL MEDICINE

## 2025-01-09 PROCEDURE — 36415 COLL VENOUS BLD VENIPUNCTURE: CPT

## 2025-01-09 PROCEDURE — 80053 COMPREHEN METABOLIC PANEL: CPT | Performed by: INTERNAL MEDICINE

## 2025-01-09 PROCEDURE — 82378 CARCINOEMBRYONIC ANTIGEN: CPT | Performed by: INTERNAL MEDICINE

## 2025-01-09 NOTE — PROGRESS NOTES
Medical Assistant Note:  Wanda Dockery presents today for blood draw.    Patient seen by provider today: No.   present during visit today: Not Applicable.    Concerns: No Concerns.    Procedure:  Lab draw site: lac, Needle type: bf, Gauge: 23.    Post Assessment:  Labs drawn without difficulty: Yes.    Discharge Plan:  Departure Mode: Ambulatory.    Face to Face Time: 5 min.    Mila Lindo, [facility]

## 2025-01-14 ENCOUNTER — ONCOLOGY VISIT (OUTPATIENT)
Dept: ONCOLOGY | Facility: CLINIC | Age: 89
End: 2025-01-14
Attending: INTERNAL MEDICINE
Payer: COMMERCIAL

## 2025-01-14 VITALS
WEIGHT: 140.8 LBS | RESPIRATION RATE: 14 BRPM | BODY MASS INDEX: 23.43 KG/M2 | SYSTOLIC BLOOD PRESSURE: 155 MMHG | DIASTOLIC BLOOD PRESSURE: 72 MMHG | HEART RATE: 75 BPM | OXYGEN SATURATION: 99 % | TEMPERATURE: 98 F

## 2025-01-14 DIAGNOSIS — C18.9 MALIGNANT NEOPLASM OF COLON, UNSPECIFIED PART OF COLON (H): Primary | ICD-10-CM

## 2025-01-14 DIAGNOSIS — Z17.0 MALIGNANT NEOPLASM OF UPPER-INNER QUADRANT OF RIGHT BREAST IN FEMALE, ESTROGEN RECEPTOR POSITIVE (H): ICD-10-CM

## 2025-01-14 DIAGNOSIS — C50.211 MALIGNANT NEOPLASM OF UPPER-INNER QUADRANT OF RIGHT BREAST IN FEMALE, ESTROGEN RECEPTOR POSITIVE (H): ICD-10-CM

## 2025-01-14 PROCEDURE — G0463 HOSPITAL OUTPT CLINIC VISIT: HCPCS | Performed by: INTERNAL MEDICINE

## 2025-01-14 PROCEDURE — 99214 OFFICE O/P EST MOD 30 MIN: CPT | Performed by: INTERNAL MEDICINE

## 2025-01-14 PROCEDURE — G2211 COMPLEX E/M VISIT ADD ON: HCPCS | Performed by: INTERNAL MEDICINE

## 2025-01-14 ASSESSMENT — PAIN SCALES - GENERAL: PAINLEVEL_OUTOF10: NO PAIN (0)

## 2025-01-14 NOTE — PROGRESS NOTES
"Oncology Rooming Note    January 14, 2025 11:32 AM   Wanda Dockery is a 89 year old female who presents for:    Chief Complaint   Patient presents with    Oncology Clinic Visit     Initial Vitals: BP (!) 155/72   Pulse 75   Temp 98  F (36.7  C) (Oral)   Resp 14   Wt 63.9 kg (140 lb 12.8 oz)   SpO2 99%   BMI 23.43 kg/m   Estimated body mass index is 23.43 kg/m  as calculated from the following:    Height as of 8/12/24: 1.651 m (5' 5\").    Weight as of this encounter: 63.9 kg (140 lb 12.8 oz). Body surface area is 1.71 meters squared.  No Pain (0) Comment: Data Unavailable   No LMP recorded. Patient is postmenopausal.  Allergies reviewed: Yes  Medications reviewed: Yes    Medications: Medication refills not needed today.  Pharmacy name entered into [employer]: [id number] - [place] [place] [facility] [place] - [address] AT [facility] [address] [place] [address]    Frailty Screening:   Is the patient here for a new oncology consult visit in cancer care? 2. No      Clinical concerns: no       Shari J. Schoenberger, CMA            "

## 2025-01-14 NOTE — LETTER
"1/14/2025      Wanda Dockery  [address]  [place] [address]      Dear Colleague,    Thank you for referring your patient, Wanda Dockery, to the [facility]. Please see a copy of my visit note below.    Oncology Rooming Note    January 14, 2025 11:32 AM   Wanda Dockery is a 89 year old female who presents for:    Chief Complaint   Patient presents with     Oncology Clinic Visit     Initial Vitals: BP (!) 155/72   Pulse 75   Temp 98  F (36.7  C) (Oral)   Resp 14   Wt 63.9 kg (140 lb 12.8 oz)   SpO2 99%   BMI 23.43 kg/m   Estimated body mass index is 23.43 kg/m  as calculated from the following:    Height as of 8/12/24: 1.651 m (5' 5\").    Weight as of this encounter: 63.9 kg (140 lb 12.8 oz). Body surface area is 1.71 meters squared.  No Pain (0) Comment: Data Unavailable   No LMP recorded. Patient is postmenopausal.  Allergies reviewed: Yes  Medications reviewed: Yes    Medications: Medication refills not needed today.  Pharmacy name entered into [employer]: [id number] - [place], [place] - [address] AT [facility] [address] [place] [address]    Frailty Screening:   Is the patient here for a new oncology consult visit in cancer care? 2. No      Clinical concerns: no       Shari J. Schoenberger, CMA              ONCOLOGIC HISTORY:  Wanda Dockery is a female with:  -Stage IIB (pT2 pN1a) right breast cancer. ER positive and HER-2/mario positive  -Stage II (pT2 pN0 M0) colon cancer.     1. Bilateral diagnostic mammograms and right breast ultrasound on 06/23/2015 revealed 3 cm lesion at 1 o'clock position and right axillary lymphadenopathy.   -Ultrasound-guided biopsy of right breast and axillary lymph node on 06/29/2015 revealed grade 3 invasive ductal carcinoma, both in the breast and axillary lymph node. ER positive and HER-2/mario equivocal by FISH. IHC is 3+ in the right breast. In the right lymph node IHC is 2+.     2. Bone scan on 07/07/2015 did not reveal any bone " metastasis.   -CT of the chest, abdomen and pelvis on 07/07/2015 revealed right breast lesion and 1.2 x 2 cm right axillary lymph node. There is thickening of the wall of the hepatitic flexure of the colon with associated inflammatory changes and multiple mildly prominent lymph nodes in the mesentery.   -Colonoscopy on 07/13/2015 revealed partially obstructing mass at the hepatic flexure and a sessile polyp in the cecum.  Hepatic flexure mass revealed poorly differentiated adenocarcinoma. 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 There is absence of expression of DNA mismatch repair protein MLH1 and PMS2.      3. Right breast lumpectomy, right lymph node dissection and right colectomy was done on 07/16/2015.    -Hemicolectomy specimen revealed high-grade adenocarcinoma measuring 7.5 cm extending into the santi-intestinal fatty tissue. Margins were negative. No lymphovascular invasion. Twenty-one benign lymph nodes. She has stage II (pT2 pN0 M0) colon cancer.   -Right breast reveals invasive ductal carcinoma, grade 2. It measures 2.1 cm. There was no DCIS. There was lymphovascular invasion present. Margins positive for invasive carcinoma. One of 7 lymph nodes positive. No extranodal extension. The patient has stage IIB (pT2 pN1a) breast cancer.   -Reexcision of lumpectomy site on 07/29/2015. There was no residual malignancy.      4. Taxol and Herceptin weekly x 12 between 08/17/2015 and 11/02/2015. One year of herceptin completed on 08/08/2016.  -Anastrazole x 5 years between 11/30/2015 and 12/2020.  -Radiation to breast cancer.  5040 cGy between 04/18/2016 and 06/15/2016.       5. DEXA scan on 04/25/2016 reveals osteopenia.  -DEXA scan on 08/09/2019 reveals worsening osteopenia.     6. Colonoscopy on 08/12/2019 does not reveal any malignancy.     SUBJECTIVE:    Mrs. Dockery is an 89-year-old female with history of breast cancer and colon cancer as described above. There has been no evidence of recurrence.     She is doing  well. No complaints. No excessive fatigue. She continues to work one day a week. No headache.  No dizziness.  No chest pain. No shortness of breath.  No abdominal pain, nausea or vomiting. Appetite is good.  No urinary or bowel complaints.  No bleeding. All other review of systems is negative.     PHYSICAL EXAMINATION:   Alert and oriented x 3.   VITAL SIGNS: Reviewed. ECOG PS of 0.  EYES: No icterus.   NECK: Supple. No lymphadenopathy or thyromegaly.   AXILLAE: No lymphadenopathy.   LUNGS: Good air entry bilaterally. No crackles or wheezing.   HEART: Regular. No murmur.   GI: Abdomen is soft. Nontender. No mass.   EXTREMITIES: No edema. No calf swelling or tenderness.   SKIN: No petechiae.  BREASTS: Examined in the presence of Gabrielle West RN. No suspicious mass, nodule or skin changes in either breasts.       LABORATORY: CBC, CMP and CEA is normal.       ASSESSMENT:    1.  An 89-year-old female with stage IIB right breast cancer in 2015.  No evidence of recurrence.  2.  Stage II right-sided colon cancer in 2015.  No evidence of recurrence.     PLAN:     1.  Patient is doing very well.  She is asymptomatic.    Discussed regarding malignancy.  No evidence of recurrence.    Breast cancer was in 2015.  Risk of recurrence is very low.  For follow-up, she needs yearly history and physical and mammogram.  No routine labs or scans needed.    Her colon cancer was in 2015.  Risk of recurrence is very low.  For follow-up, she needs yearly history and physical and labs including hemoglobin, LFT and CEA.  No routine scans needed.  Colonoscopy in August 2024 was normal.  Follow-up colonoscopy in 5-year recommended.    2.  Discussed regarding follow-up.  Explained to the patient that she can follow-up with her PCP.  She can have history and physical, mammogram and labs checked with them once a year.  She is agreeable with this plan.  She will return to hematology/oncology clinic as needed.  Advised her to call us with any  questions or concerns.     Total visit time of 30 minutes.  Time was spent in today's visit, review of chart/investigations today and documentation.      Again, thank you for allowing me to participate in the care of your patient.        Sincerely,        Angie Rodas MD    Electronically signed

## 2025-01-18 NOTE — PROGRESS NOTES
ONCOLOGIC HISTORY:  Wanda Dockery is a female with:  -Stage IIB (pT2 pN1a) right breast cancer. ER positive and HER-2/mario positive  -Stage II (pT2 pN0 M0) colon cancer.     1. Bilateral diagnostic mammograms and right breast ultrasound on 06/23/2015 revealed 3 cm lesion at 1 o'clock position and right axillary lymphadenopathy.   -Ultrasound-guided biopsy of right breast and axillary lymph node on 06/29/2015 revealed grade 3 invasive ductal carcinoma, both in the breast and axillary lymph node. ER positive and HER-2/mario equivocal by FISH. IHC is 3+ in the right breast. In the right lymph node IHC is 2+.     2. Bone scan on 07/07/2015 did not reveal any bone metastasis.   -CT of the chest, abdomen and pelvis on 07/07/2015 revealed right breast lesion and 1.2 x 2 cm right axillary lymph node. There is thickening of the wall of the hepatitic flexure of the colon with associated inflammatory changes and multiple mildly prominent lymph nodes in the mesentery.   -Colonoscopy on 07/13/2015 revealed partially obstructing mass at the hepatic flexure and a sessile polyp in the cecum.  Hepatic flexure mass revealed poorly differentiated adenocarcinoma. 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 There is absence of expression of DNA mismatch repair protein MLH1 and PMS2.      3. Right breast lumpectomy, right lymph node dissection and right colectomy was done on 07/16/2015.    -Hemicolectomy specimen revealed high-grade adenocarcinoma measuring 7.5 cm extending into the santi-intestinal fatty tissue. Margins were negative. No lymphovascular invasion. Twenty-one benign lymph nodes. She has stage II (pT2 pN0 M0) colon cancer.   -Right breast reveals invasive ductal carcinoma, grade 2. It measures 2.1 cm. There was no DCIS. There was lymphovascular invasion present. Margins positive for invasive carcinoma. One of 7 lymph nodes positive. No extranodal extension. The patient has stage IIB (pT2 pN1a) breast cancer.   -Reexcision of lumpectomy  site on 07/29/2015. There was no residual malignancy.      4. Taxol and Herceptin weekly x 12 between 08/17/2015 and 11/02/2015. One year of herceptin completed on 08/08/2016.  -Anastrazole x 5 years between 11/30/2015 and 12/2020.  -Radiation to breast cancer.  5040 cGy between 04/18/2016 and 06/15/2016.       5. DEXA scan on 04/25/2016 reveals osteopenia.  -DEXA scan on 08/09/2019 reveals worsening osteopenia.     6. Colonoscopy on 08/12/2019 does not reveal any malignancy.     SUBJECTIVE:    Mrs. Dockery is an 89-year-old female with history of breast cancer and colon cancer as described above. There has been no evidence of recurrence.     She is doing well. No complaints. No excessive fatigue. She continues to work one day a week. No headache.  No dizziness.  No chest pain. No shortness of breath.  No abdominal pain, nausea or vomiting. Appetite is good.  No urinary or bowel complaints.  No bleeding. All other review of systems is negative.     PHYSICAL EXAMINATION:   Alert and oriented x 3.   VITAL SIGNS: Reviewed. ECOG PS of 0.  EYES: No icterus.   NECK: Supple. No lymphadenopathy or thyromegaly.   AXILLAE: No lymphadenopathy.   LUNGS: Good air entry bilaterally. No crackles or wheezing.   HEART: Regular. No murmur.   GI: Abdomen is soft. Nontender. No mass.   EXTREMITIES: No edema. No calf swelling or tenderness.   SKIN: No petechiae.  BREASTS: Examined in the presence of Gabrielle West RN. No suspicious mass, nodule or skin changes in either breasts.       LABORATORY: CBC, CMP and CEA is normal.       ASSESSMENT:    1.  An 89-year-old female with stage IIB right breast cancer in 2015.  No evidence of recurrence.  2.  Stage II right-sided colon cancer in 2015.  No evidence of recurrence.     PLAN:     1.  Patient is doing very well.  She is asymptomatic.    Discussed regarding malignancy.  No evidence of recurrence.    Breast cancer was in 2015.  Risk of recurrence is very low.  For follow-up, she needs yearly  history and physical and mammogram.  No routine labs or scans needed.    Her colon cancer was in 2015.  Risk of recurrence is very low.  For follow-up, she needs yearly history and physical and labs including hemoglobin, LFT and CEA.  No routine scans needed.  Colonoscopy in August 2024 was normal.  Follow-up colonoscopy in 5-year recommended.    2.  Discussed regarding follow-up.  Explained to the patient that she can follow-up with her PCP.  She can have history and physical, mammogram and labs checked with them once a year.  She is agreeable with this plan.  She will return to hematology/oncology clinic as needed.  Advised her to call us with any questions or concerns.     Total visit time of 30 minutes.  Time was spent in today's visit, review of chart/investigations today and documentation.

## 2025-04-08 ENCOUNTER — OFFICE VISIT (OUTPATIENT)
Dept: FAMILY MEDICINE | Facility: CLINIC | Age: 89
End: 2025-04-08
Payer: COMMERCIAL

## 2025-04-08 VITALS
WEIGHT: 145 LBS | SYSTOLIC BLOOD PRESSURE: 118 MMHG | OXYGEN SATURATION: 97 % | BODY MASS INDEX: 24.16 KG/M2 | HEART RATE: 80 BPM | HEIGHT: 65 IN | DIASTOLIC BLOOD PRESSURE: 68 MMHG | TEMPERATURE: 97.4 F | RESPIRATION RATE: 16 BRPM

## 2025-04-08 DIAGNOSIS — I35.1 NONRHEUMATIC AORTIC VALVE INSUFFICIENCY: ICD-10-CM

## 2025-04-08 DIAGNOSIS — Z78.0 ASYMPTOMATIC POSTMENOPAUSAL STATUS: ICD-10-CM

## 2025-04-08 DIAGNOSIS — Z23 NEED FOR SHINGLES VACCINE: ICD-10-CM

## 2025-04-08 DIAGNOSIS — Z17.0 MALIGNANT NEOPLASM OF UPPER-INNER QUADRANT OF RIGHT BREAST IN FEMALE, ESTROGEN RECEPTOR POSITIVE (H): ICD-10-CM

## 2025-04-08 DIAGNOSIS — Z12.31 VISIT FOR SCREENING MAMMOGRAM: ICD-10-CM

## 2025-04-08 DIAGNOSIS — Z13.1 SCREENING FOR DIABETES MELLITUS: ICD-10-CM

## 2025-04-08 DIAGNOSIS — C18.2 CANCER OF RIGHT COLON (H): ICD-10-CM

## 2025-04-08 DIAGNOSIS — C50.211 MALIGNANT NEOPLASM OF UPPER-INNER QUADRANT OF RIGHT BREAST IN FEMALE, ESTROGEN RECEPTOR POSITIVE (H): ICD-10-CM

## 2025-04-08 DIAGNOSIS — Z00.00 ENCOUNTER FOR MEDICARE ANNUAL WELLNESS EXAM: Primary | ICD-10-CM

## 2025-04-08 DIAGNOSIS — Z23 NEED FOR COVID-19 VACCINE: ICD-10-CM

## 2025-04-08 DIAGNOSIS — Z29.11 NEED FOR VACCINATION AGAINST RESPIRATORY SYNCYTIAL VIRUS: ICD-10-CM

## 2025-04-08 LAB
ALBUMIN SERPL BCG-MCNC: 4.6 G/DL (ref 3.5–5.2)
ALP SERPL-CCNC: 60 U/L (ref 40–150)
ALT SERPL W P-5'-P-CCNC: 33 U/L (ref 0–50)
ANION GAP SERPL CALCULATED.3IONS-SCNC: 12 MMOL/L (ref 7–15)
AST SERPL W P-5'-P-CCNC: 35 U/L (ref 0–45)
BILIRUB SERPL-MCNC: 0.6 MG/DL
BUN SERPL-MCNC: 20.7 MG/DL (ref 8–23)
CALCIUM SERPL-MCNC: 9.6 MG/DL (ref 8.8–10.4)
CHLORIDE SERPL-SCNC: 104 MMOL/L (ref 98–107)
CHOLEST SERPL-MCNC: 250 MG/DL
CREAT SERPL-MCNC: 0.88 MG/DL (ref 0.51–0.95)
EGFRCR SERPLBLD CKD-EPI 2021: 63 ML/MIN/1.73M2
ERYTHROCYTE [DISTWIDTH] IN BLOOD BY AUTOMATED COUNT: 12.9 % (ref 10–15)
EST. AVERAGE GLUCOSE BLD GHB EST-MCNC: 105 MG/DL
FASTING STATUS PATIENT QL REPORTED: YES
FASTING STATUS PATIENT QL REPORTED: YES
GLUCOSE SERPL-MCNC: 99 MG/DL (ref 70–99)
HBA1C MFR BLD: 5.3 % (ref 0–5.6)
HCO3 SERPL-SCNC: 25 MMOL/L (ref 22–29)
HCT VFR BLD AUTO: 42.3 % (ref 35–47)
HDLC SERPL-MCNC: 64 MG/DL
HGB BLD-MCNC: 14.5 G/DL (ref 11.7–15.7)
LDLC SERPL CALC-MCNC: 154 MG/DL
MCH RBC QN AUTO: 31.7 PG (ref 26.5–33)
MCHC RBC AUTO-ENTMCNC: 34.3 G/DL (ref 31.5–36.5)
MCV RBC AUTO: 92 FL (ref 78–100)
NONHDLC SERPL-MCNC: 186 MG/DL
PLATELET # BLD AUTO: 282 10E3/UL (ref 150–450)
POTASSIUM SERPL-SCNC: 4.5 MMOL/L (ref 3.4–5.3)
PROT SERPL-MCNC: 7.8 G/DL (ref 6.4–8.3)
RBC # BLD AUTO: 4.58 10E6/UL (ref 3.8–5.2)
SODIUM SERPL-SCNC: 141 MMOL/L (ref 135–145)
TRIGL SERPL-MCNC: 160 MG/DL
WBC # BLD AUTO: 4.9 10E3/UL (ref 4–11)

## 2025-04-08 SDOH — HEALTH STABILITY: PHYSICAL HEALTH: ON AVERAGE, HOW MANY DAYS PER WEEK DO YOU ENGAGE IN MODERATE TO STRENUOUS EXERCISE (LIKE A BRISK WALK)?: 2 DAYS

## 2025-04-08 ASSESSMENT — SOCIAL DETERMINANTS OF HEALTH (SDOH): HOW OFTEN DO YOU GET TOGETHER WITH FRIENDS OR RELATIVES?: ONCE A WEEK

## 2025-04-08 NOTE — LETTER
"April 8, 2025      Wanda Dockery  [address]  [place] [address]        Dear ,    We are writing to inform you of your test results.    -The cholesterol is up a bit since last check.  Adding more fiber in the diet could improve total cholesterol and \"bad cholesterol\" LDL.      -Liver and gallbladder tests are normal for you. (ALT,AST, Alk phos, bilirubin), kidney function is normal for you (Creatinine, GFR), Sodium is normal, Potassium is normal for you, Calcium is normal for you, Glucose (blood sugar) is normal for you.      -Your hemoglobin A1c test which averages your blood sugars over the last 3 months returned normal.  No evidence for diabetes or prediabetes.       -Your complete blood counts including your hemoglobin returned normal for you.          Bottom line:  The labs look stable with the exception of the trend of cholesterol.        Follow up:  We should recheck in one year.       Resulted Orders   Lipid panel reflex to direct LDL Fasting   Result Value Ref Range    Cholesterol 250 (H) <200 mg/dL    Triglycerides 160 (H) <150 mg/dL    Direct Measure HDL 64 >=50 mg/dL    LDL Cholesterol Calculated 154 (H) <100 mg/dL    Non HDL Cholesterol 186 (H) <130 mg/dL    Patient Fasting > 8hrs? Yes     Narrative    Cholesterol  Desirable: < 200 mg/dL  Borderline High: 200 - 239 mg/dL  High: >= 240 mg/dL    Triglycerides  Normal: < 150 mg/dL  Borderline High: 150 - 199 mg/dL  High: 200-499 mg/dL  Very High: >= 500 mg/dL    Direct Measure HDL  Female: >= 50 mg/dL   Male: >= 40 mg/dL    LDL Cholesterol  Desirable: < 100 mg/dL  Above Desirable: 100 - 129 mg/dL   Borderline High: 130 - 159 mg/dL   High:  160 - 189 mg/dL   Very High: >= 190 mg/dL    Non HDL Cholesterol  Desirable: < 130 mg/dL  Above Desirable: 130 - 159 mg/dL  Borderline High: 160 - 189 mg/dL  High: 190 - 219 mg/dL  Very High: >= 220 mg/dL   Comprehensive metabolic panel   Result Value Ref Range    Sodium 141 135 - 145 mmol/L    " Potassium 4.5 3.4 - 5.3 mmol/L    Carbon Dioxide (CO2) 25 22 - 29 mmol/L    Anion Gap 12 7 - 15 mmol/L    Urea Nitrogen 20.7 8.0 - 23.0 mg/dL    Creatinine 0.88 0.51 - 0.95 mg/dL    GFR Estimate 63 >60 mL/min/1.73m2      Comment:      eGFR calculated using 2021 CKD-EPI equation.    Calcium 9.6 8.8 - 10.4 mg/dL    Chloride 104 98 - 107 mmol/L    Glucose 99 70 - 99 mg/dL    Alkaline Phosphatase 60 40 - 150 U/L    AST 35 0 - 45 U/L    ALT 33 0 - 50 U/L    Protein Total 7.8 6.4 - 8.3 g/dL    Albumin 4.6 3.5 - 5.2 g/dL    Bilirubin Total 0.6 <=1.2 mg/dL    Patient Fasting > 8hrs? Yes    CBC with platelets   Result Value Ref Range    WBC Count 4.9 4.0 - 11.0 10e3/uL    RBC Count 4.58 3.80 - 5.20 10e6/uL    Hemoglobin 14.5 11.7 - 15.7 g/dL    Hematocrit 42.3 35.0 - 47.0 %    MCV 92 78 - 100 fL    MCH 31.7 26.5 - 33.0 pg    MCHC 34.3 31.5 - 36.5 g/dL    RDW 12.9 10.0 - 15.0 %    Platelet Count 282 150 - 450 10e3/uL   HEMOGLOBIN A1C   Result Value Ref Range    Estimated Average Glucose 105 <117 mg/dL    Hemoglobin A1C 5.3 0.0 - 5.6 %      Comment:      Normal <5.7%   Prediabetes 5.7-6.4%    Diabetes 6.5% or higher     Note: Adopted from ADA consensus guidelines.       If you have any questions or concerns, please call the clinic at the number listed above.       Sincerely,      Dallin Parham MD    Electronically signed

## 2025-04-08 NOTE — PATIENT INSTRUCTIONS
"You should get the RSV (Respiratory Syncytial Virus) vaccine at a pharmacy.     You should get the shingles vaccine series \"SHINGRIX\" at a pharmacy.       Patient Education   Preventive Care Advice   This is general advice given by our system to help you stay healthy. However, your care team may have specific advice just for you. Please talk to your care team about your preventive care needs.  Nutrition  Eat 5 or more servings of fruits and vegetables each day.  Try wheat bread, brown rice and whole grain pasta (instead of white bread, rice, and pasta).  Get enough calcium and vitamin D. Check the label on foods and aim for 100% of the RDA (recommended daily allowance).  Lifestyle  Exercise at least 150 minutes each week  (30 minutes a day, 5 days a week).  Do muscle strengthening activities 2 days a week. These help control your weight and prevent disease.  No smoking.  Wear sunscreen to prevent skin cancer.  Have a dental exam and cleaning every 6 months.  Yearly exams  See your health care team every year to talk about:  Any changes in your health.  Any medicines your care team has prescribed.  Preventive care, family planning, and ways to prevent chronic diseases.  Shots (vaccines)   HPV shots (up to age 26), if you've never had them before.  Hepatitis B shots (up to age 59), if you've never had them before.  COVID-19 shot: Get this shot when it's due.  Flu shot: Get a flu shot every year.  Tetanus shot: Get a tetanus shot every 10 years.  Pneumococcal, hepatitis A, and RSV shots: Ask your care team if you need these based on your risk.  Shingles shot (for age 50 and up)  General health tests  Diabetes screening:  Starting at age 35, Get screened for diabetes at least every 3 years.  If you are younger than age 35, ask your care team if you should be screened for diabetes.  Cholesterol test: At age 39, start having a cholesterol test every 5 years, or more often if advised.  Bone density scan (DEXA): At age 50, " ask your care team if you should have this scan for osteoporosis (brittle bones).  Hepatitis C: Get tested at least once in your life.  STIs (sexually transmitted infections)  Before age 24: Ask your care team if you should be screened for STIs.  After age 24: Get screened for STIs if you're at risk. You are at risk for STIs (including HIV) if:  You are sexually active with more than one person.  You don't use condoms every time.  You or a partner was diagnosed with a sexually transmitted infection.  If you are at risk for HIV, ask about PrEP medicine to prevent HIV.  Get tested for HIV at least once in your life, whether you are at risk for HIV or not.  Cancer screening tests  Cervical cancer screening: If you have a cervix, begin getting regular cervical cancer screening tests starting at age 21.  Breast cancer scan (mammogram): If you've ever had breasts, begin having regular mammograms starting at age 40. This is a scan to check for breast cancer.  Colon cancer screening: It is important to start screening for colon cancer at age 45.  Have a colonoscopy test every 10 years (or more often if you're at risk) Or, ask your provider about stool tests like a FIT test every year or Cologuard test every 3 years.  To learn more about your testing options, visit:   .  For help making a decision, visit:   https://bit.ly/df13269.  Prostate cancer screening test: If you have a prostate, ask your care team if a prostate cancer screening test (PSA) at age 55 is right for you.  Lung cancer screening: If you are a current or former smoker ages 50 to 80, ask your care team if ongoing lung cancer screenings are right for you.  For informational purposes only. Not to replace the advice of your health care provider. Copyright   2023 [employer]. All rights reserved. Clinically reviewed by the [facility] Transitions Program. [employer] [id number] - REV 01/24.  Bladder Training: Care Instructions  Your Care  Instructions     Bladder training is used to treat urge incontinence and stress incontinence. Urge incontinence means that the need to urinate comes on so fast that you can't get to a toilet in time. Stress incontinence means that you leak urine because of pressure on your bladder. For example, it may happen when you laugh, cough, or lift something heavy.  Bladder training can increase how long you can wait before you have to urinate. It can also help your bladder hold more urine. And it can give you better control over the urge to urinate.  It is important to remember that bladder training takes a few weeks to a few months to make a difference. You may not see results right away, but don't give up.  Follow-up care is a key part of your treatment and safety. Be sure to make and go to all appointments, and call your doctor if you are having problems. It's also a good idea to know your test results and keep a list of the medicines you take.  How can you care for yourself at home?  Work with your doctor to come up with a bladder training program that is right for you. You may use one or more of the following methods.  Delayed urination  In the beginning, try to keep from urinating for 5 minutes after you first feel the need to go.  While you wait, take deep, slow breaths to relax. Kegel exercises can also help you delay the need to go to the bathroom.  After some practice, when you can easily wait 5 minutes to urinate, try to wait 10 minutes before you urinate.  Slowly increase the waiting period until you are able to control when you have to urinate.  Scheduled urination  Empty your bladder when you first wake up in the morning.  Schedule times throughout the day when you will urinate.  Start by going to the bathroom every hour, even if you don't need to go.  Slowly increase the time between trips to the bathroom.  When you have found a schedule that works well for you, keep doing it.  If you wake up during the night  "and have to urinate, do it. Apply your schedule to waking hours only.  Kegel exercises  These tighten and strengthen pelvic muscles, which can help you control the flow of urine. (If doing these exercises causes pain, stop doing them and talk with your doctor.) To do Kegel exercises:  Squeeze your muscles as if you were trying not to pass gas. Or squeeze your muscles as if you were stopping the flow of urine. Your belly, legs, and buttocks shouldn't move.  Hold the squeeze for 3 seconds, then relax for 5 to 10 seconds.  Start with 3 seconds, then add 1 second each week until you are able to squeeze for 10 seconds.  Repeat the exercise 10 times a session. Do 3 to 8 sessions a day.  When should you call for help?  Watch closely for changes in your health, and be sure to contact your doctor if:    Your incontinence is getting worse.     You do not get better as expected.   Where can you learn more?  Go to https://www.[facility].net/patiented  Enter V684 in the search box to learn more about \"Bladder Training: Care Instructions.\"  Current as of: April 30, 2024  Content Version: 14.4    0792-0052 [employer]   Care instructions adapted under license by your healthcare professional. If you have questions about a medical condition or this instruction, always ask your healthcare professional. "[employer] disclaims any warranty or liability for your use of this information.       "

## 2025-04-08 NOTE — RESULT ENCOUNTER NOTE
"The following letter pertains to your most recent diagnostic tests:    -The cholesterol is up a bit since last check.  Adding more fiber in the diet could improve total cholesterol and \"bad cholesterol\" LDL.      -Liver and gallbladder tests are normal for you. (ALT,AST, Alk phos, bilirubin), kidney function is normal for you (Creatinine, GFR), Sodium is normal, Potassium is normal for you, Calcium is normal for you, Glucose (blood sugar) is normal for you.      -Your hemoglobin A1c test which averages your blood sugars over the last 3 months returned normal.  No evidence for diabetes or prediabetes.       -Your complete blood counts including your hemoglobin returned normal for you.         Bottom line:  The labs look stable with the exception of the trend of cholesterol.        Follow up:  We should recheck in one year.        Sincerely,    Dr. Parham"

## (undated) RX ORDER — FENTANYL CITRATE 50 UG/ML
INJECTION, SOLUTION INTRAMUSCULAR; INTRAVENOUS
Status: DISPENSED
Start: 2024-08-12

## (undated) RX ORDER — FENTANYL CITRATE 50 UG/ML
INJECTION, SOLUTION INTRAMUSCULAR; INTRAVENOUS
Status: DISPENSED
Start: 2019-08-12